# Patient Record
Sex: FEMALE | Race: WHITE | Employment: OTHER | URBAN - METROPOLITAN AREA
[De-identification: names, ages, dates, MRNs, and addresses within clinical notes are randomized per-mention and may not be internally consistent; named-entity substitution may affect disease eponyms.]

---

## 2017-06-01 ENCOUNTER — HOSPITAL ENCOUNTER (OUTPATIENT)
Dept: ULTRASOUND IMAGING | Age: 67
Discharge: HOME | End: 2017-06-01
Attending: FAMILY MEDICINE
Payer: MEDICARE

## 2017-06-01 ENCOUNTER — HOSPITAL ENCOUNTER (OUTPATIENT)
Dept: MAMMOGRAPHY | Age: 67
Discharge: HOME | End: 2017-06-01
Attending: FAMILY MEDICINE
Payer: MEDICARE

## 2017-06-01 DIAGNOSIS — N61.1 LEFT BREAST ABSCESS: ICD-10-CM

## 2017-06-01 DIAGNOSIS — N63.0 LUMP OR MASS IN BREAST: ICD-10-CM

## 2017-06-01 PROCEDURE — 76642 ULTRASOUND BREAST LIMITED: CPT

## 2017-06-01 PROCEDURE — 77066 DX MAMMO INCL CAD BI: CPT

## 2017-06-21 ENCOUNTER — HOSPITAL ENCOUNTER (OUTPATIENT)
Dept: GENERAL RADIOLOGY | Age: 67
Discharge: HOME | End: 2017-06-21
Payer: MEDICARE

## 2017-06-21 DIAGNOSIS — M54.32 BACK PAIN WITH LEFT-SIDED SCIATICA: ICD-10-CM

## 2017-06-21 PROCEDURE — 72100 X-RAY EXAM L-S SPINE 2/3 VWS: CPT

## 2017-06-21 PROCEDURE — 73502 X-RAY EXAM HIP UNI 2-3 VIEWS: CPT

## 2017-07-06 ENCOUNTER — HOSPITAL ENCOUNTER (OUTPATIENT)
Dept: PHYSICAL THERAPY | Age: 67
Discharge: HOME | End: 2017-07-06
Payer: MEDICARE

## 2017-07-06 PROCEDURE — G8985 CARRY GOAL STATUS: HCPCS

## 2017-07-06 PROCEDURE — 97140 MANUAL THERAPY 1/> REGIONS: CPT

## 2017-07-06 PROCEDURE — 97110 THERAPEUTIC EXERCISES: CPT

## 2017-07-06 PROCEDURE — 97161 PT EVAL LOW COMPLEX 20 MIN: CPT

## 2017-07-06 PROCEDURE — 97035 APP MDLTY 1+ULTRASOUND EA 15: CPT

## 2017-07-06 PROCEDURE — G8984 CARRY CURRENT STATUS: HCPCS

## 2017-07-06 NOTE — PROGRESS NOTES
[facility] [address], [address] [place] [address] 434,[facility] 300 [phone] fax    Plan of Care/ Statement of Necessity for Physical Therapy Services    Patient name: Waleska Cotto Start of Care: 2017   Referral source: Harry Hui MD : 1950    Medical Diagnosis: Low back pain [M54.5]   Onset Date:2017    Treatment Diagnosis: Pelvic obliquity, Glute muscle tightness   Prior Hospitalization: see medical history Provider#: [id number]   Medications: Verified on Patient summary List    Comorbidities: HTN, Thyroid problems   Prior Level of Function:  Able to dance with friends, do house work, lift a case of water and able to do gardening      The Plan of Care and following information is based on the information from the initial evaluation. Assessment/ key information: Patient is a 78 y.o. female referred to PT with the above Dx. Patient seen today for c/o LBP with radicular pain into the (L) buttocks. Patient presents to PT with an impaired gait, decreased strength, decreased flexibility, and decreased mobility. Patient s/s appear to be consistent w/ diagnosis. Patient demonstrates the potential to make functional gains within a reasonable time frame. Patient will benefit from skilled PT to address impairments and improve functional mobility, strength, gait and balance for an improved quality of life.   Fall Risk Assessment: Patient demonstrates no Fall Risk   Evaluation Complexity History MEDIUM  Complexity : 1-2 comorbidities / personal factors will impact the outcome/ POC ; Examination LOW Complexity : 1-2 Standardized tests and measures addressing body structure, function, activity limitation and / or participation in recreation  ;Presentation LOW Complexity : Stable, uncomplicated  ;Clinical Decision Making MEDIUM Complexity : FOTO score of 26-74  Overall Complexity Rating: LOW   Problem List: pain affecting function, decrease ROM, decrease strength, impaired gait/ balance, decrease ADL/ functional abilitiies, decrease activity tolerance, decrease flexibility/ joint mobility, decrease transfer abilities and other FOTO = 36   Treatment Plan may include any combination of the following: Therapeutic exercise, Therapeutic activities, Neuromuscular re-education, Physical agent/modality, Gait/balance training, Manual therapy, Patient education, Self Care training and Functional mobility training  Patient / Family readiness to learn indicated by: asking questions, trying to perform skills and interest  Persons(s) to be included in education: patient (P)  Barriers to Learning/Limitations: None  Patient Goal (s): To be able to do the things that I used to do around my house. Be able to dance  Patient Self Reported Health Status: good  Rehabilitation Potential: good    Short Term Goals: To be accomplished in 5 treatments:  1. Pt will be compliant and independent with HEP in order to facilitate PT sessions and aid with self management   Eval Status:  Initiated   Current Status:  2. Pt to tolerate 30 min or more of TE and/or Interventions w/o increased s/s   Eval Status:  Initiated   Current Status:      Long Term Goals: To be accomplished in 18 treatments:  1. Pt will report 50% improvement or better with involvement to show a significant increase in function   Eval Status:  Initiated   Current Status:  2. Pt will demonstrate the ability to stoop and lift 25# to allow her to tolerate light lifting with house work   Eval Status:  Initiated   Current Status:  3. Pt will demonstrate push/pull of a 100# sled for 10 feet with little to no difficulty to allow her to move furniture with her daily cleaning   Eval Status:  Initiated   Current Status:  4.   Pt will improve FOTO score to 59 in 12 visits to show significant improvement in function for progress to performing usual work with little to no difficulty   Eval Status: 36   Current Status:     Frequency / Duration: Patient to be seen 2-3 times per week for 18 treatments. Patient/ Caregiver education and instruction: Diagnosis, prognosis, self care, activity modification and exercises   Comments:  Patient provided w/HEP   [x]  Plan of care has been reviewed with SCOTTY    G-Marry (GP)    Carry   Current  CL= 60-79%    Goal  CK= 40-59%      The severity rating is based on clinical judgment and the FOTO score. Certification Period: 7/6/17 - 8/4/17  Richmond Melendez, PT 7/6/2017 3:19 PM    ________________________________________________________________________    I certify that the above Therapy Services are being furnished while the patient is under my care. I agree with the treatment plan and certify that this therapy is necessary.     Physician's Signature:____________________  Date:____________Time: _________    Please sign and return to [facility] [address] [facility] [address]    [address] [facility], [address] [place] [address] 434,[place] 300  [phone] fax

## 2017-07-06 NOTE — PROGRESS NOTES
PT DAILY TREATMENT NOTE/LUMBAR EVAL 3-16    Patient Name: Bailey Wynn  Date:2017  : 1950  [x]  Patient  Verified  Payor: [place] MEDICARE / Plan: [place] MEDICARE PART A & B / Product Type: Medicare /    In [id number]  Out time:1100  Total Treatment Time (min): 60  Total Timed Codes (min): 38  1:1 Treatment Time ( only): 60   Visit #: 1 of 18    Treatment Area: Low back pain [M54.5]  SUBJECTIVE  Pain Level (0-10 scale): 5  [x]constant []intermittent []improving []worsening []no change since onset    Any medication changes, allergies to medications, adverse drug reactions, diagnosis change, or new procedure performed?: [x] No    [] Yes (see summary sheet for update)  Subjective functional status/changes:   Pt c/o LBP and buttocks pain that sometimes shoots up into the (R) mid back. Pain with lifting. The pain is there all of the time. Pain is decreased with Aleve. Uses a heating pad on the low back for relief. Rest helps with the pain, stopped lifting with house work    Subjective:  Chief Complaint/: Pain in the low back and (L) buttocks    Onset: 2017    Mechanism of Injury: Mid part of May when she fell in a shower but no added pain,  In  pain got worse after pulling weeds in the garden    PMHx/Surgical Hx: thyroid problems, HTN    What type of work do you do? No    Living Situation: Lives at home with her     What type of daily activities/hobbies? House work, gardening, cooking,     Limitations to Activity: Limited lifting and moving furniture with cleaning, Bending, can't lift a case of water     Current Health Status:  Good    Barriers: [x]pain []financial []time []transportation []other    Goal: To be able to do the things that I used to do around my house.   Be able to dance    PLOF: Able to dance with friends, do house work, lift a case of water and able to do gardening    Limitations to PLOF:    Motivation: High    Substance use: [x]Alcohol []Tobacco []other: Wine everynow and then    FABQ Score: []low []elevate    Cognition: A & O x 3    Other:        OBJECTIVE/EXAMINATION  Domestic Life: Normal  Activity/Recreational Limitations: Yes, due to back pain   Mobility: (I)  Self Care: (I)    - Gait - decr (L) pelvic sway, decr trunk rot, decr (B) Hip flx  - Elev (R) Sacral base  - Good innominate mobility in stork stance        Modality rationale: decrease inflammation, decrease pain and increase tissue extensibility to improve the patients ability to tolerate increased activity levels   Min Type Additional Details    [] Estim:  []Unatt       []IFC  []Premod                        []Other:  []w/ice   []w/heat  Position:  Location:    [] Estim: []Att    []TENS instruct  []NMES                    []Other:  []w/US   []w/ice   []w/heat  Position:  Location:    []  Traction: [] Cervical       []Lumbar                       [] Prone          []Supine                       []Intermittent   []Continuous Lbs:  [] before manual  [] after manual    [x]  Ultrasound: [x]Continuous   [] Pulsed     8 Min                      [x]1MHz   []3MHz Location: (L) Superior glute and (B) Lx paraspinals  W/cm2:1.8    []  Iontophoresis with dexamethasone         Location: [] Take home patch   [] In clinic    []  Ice     []  heat  []  Ice massage  []  Laser   []  Anodyne Position:  Location:    []  Laser with stim  []  Other: Position:  Location:    []  Vasopneumatic Device Pressure:       [] lo [] med [] hi   Temperature: [] lo [] med [] hi   [] Skin assessment post-treatment:  []intact []redness- no adverse reaction    []redness  adverse reaction:     22 min [x]Eval                  []Re-Eval       10 min Therapeutic Exercise:  [x] See flow sheet : Develop and instruct HEP   Rationale: increase ROM, increase strength and improve coordination to improve the patients ability to tolerate increased activity levels    20 min Manual Therapy:  STM/DTM (L) Superior glute and (B) Lx paraspinals, Inf glide (R) sacral base, (B) Lx rot mobs, (B) LE LAD   Rationale: decrease pain, increase ROM, increase tissue extensibility and decrease trigger points to tolerate increased activity levels        With   [] TE   [] TA   [] neuro   [] other: Patient Education: [x] Review HEP    [] Progressed/Changed HEP based on:   [] positioning   [] body mechanics   [] transfers   [] heat/ice application    [] other:      Other Objective/Functional Measures:   - Improved Sacral alignment and pelvic mobility after treatment  - good response to treatment with decreased pain    Physical Therapy Evaluation - Lumbar Spine ([facility])    SUBJECTIVE  Chief Complaint:    Mechanism of injury:    Symptoms:  Aggravated by:   [] Bending [] Sitting [] Standing [] Walking   [] Moving [] Cough [] Sneeze [] Valsalva   [] AM  [] PM  Lying:  [] sup   [] pro   [] sidelying   [] Other:     Eased by:    [] Bending [] Sitting [] Standing [] Walking   [] Moving [] AM  [] PM  Lying: [] sup  [] pro  [] sidelying   [] Other:     General Health:  Red Flags Indicated? [] Yes    [] No  [] Yes [] No Recent weight change (If yes, due to dieting?  [] Yes  [] No)   [] Yes [] No Weakness in legs during walking  [] Yes [] No Unremitting pain at night  [] Yes [] No Abdominal pain or problems  [] Yes [] No Rectal bleeding  [] Yes [] No Feet more cold or painful in cold weather  [] Yes [] No Menstrual irregularities  [] Yes [] No Blood or pain with urination  [] Yes [] No Dysfunction of bowel or bladder  [] Yes [] No Recent illness within past 3 weeks (i.e, cold, flu)  [] Yes [] No Numbness/tingling in buttock/genitalia region    Past History/Treatments:     Diagnostic Tests: [] Lab work [] X-rays    [] CT [] MRI     [] Other:  Results:    Functional Status  Prior level of function:  Present functional limitations:  What position do you sleep in?:    OBJECTIVE  Posture:  Lateral Shift: [] R    [] L     [] +  [] -  Kyphosis: [] Increased [] Decreased   []  WNL  Lordosis:  [] Increased [] Decreased   [] WNL  Pelvic symmetry: [] WNL    [] Other:    Gait:  [] Normal     [] Abnormal:    Active Movements: [] N/A   [] Too acute   [] Other:  ROM % AROM % PROM Comments:pain, area   Forward flexion 40-60      Extension 20-30      SB right 20-30      SB left 20-30      Rotation right 5-10      Rotation left 5-10        Repeated Movements   Effects on present pain: produces (LA), abolishes (A), increases (incr), decreases (decr), centralizes (C), peripheral (PH), no effect (NE)   Pre-Test Sx Flexion Repeated Flexion Extension Repeated Extension Repeated SBL Repeated SBR   Sitting          Standing          Lying      N/A N/A   Comments:  Side Glide:  Sustained passive positioning test:    Neuro Screen [] WNL  Myotome/Dermatome/Reflexes:  Comments:    Dural Mobility:  SLR Sitting: [] R    [] L    [] +    [] -  @ (degrees):           Supine: [] R    [] L    [] +    [] -  @ (degrees):   Slump Test: [] R    [] L    [] +    [] -  @ (degrees):   Prone Knee Bend: [] R    [] L    [] +    [] -     Palpation  [] Min  [] Mod  [] Severe    Location:  [] Min  [] Mod  [] Severe    Location:  [] Min  [] Mod  [] Severe    Location:    Strength   L(0-5) R (0-5) N/T   Hip Flexion (L1,2)   []   Knee Extension (L3,4)   []   Ankle Dorsiflexion (L4)   []   Great Toe Extension (L5)   []   Ankle Plantarflexion (S1)   []   Knee Flexion (S1,2)   []   Upper Abdominals   []   Lower Abdominals   []   Paraspinals   []   Back Rotators   []   Gluteus Dionicio   []   Other   []     Special Tests  Lumbar:  Lumb.  Compression: [] Pos  [] Neg               Lumbar Distraction:   [] Pos  [] Neg    Quadrant:  [] Pos  [] Neg   [] Flex  [] Ext    Sacroilliac:  Gaenslen's: [] R    [] L    [] +    [] -     Compression: [] +    [] -     Gapping:  [] +    [] -     Thigh Thrust: [] R    [] L    [] +    [] -     Leg Length: [] +    [] -   Position:    Crests:    ASIS:    PSIS:    Sacral Sulcus:    Mobility: Standing flex:     Sitting flex:     Supine to sit:     Prone knee bend:         Hip: Garcia Miracle:  [] R    [] L    [] +    [] -     Scour:  [] R    [] L    [] +    [] -     Piriformis: [] R    [] L    [] +    [] -          Deficits: Boris's: [] R    [] L    [] +    [] -     Adline Row: [] R    [] L    [] +    [] -     Hamstrings 90/90:    Gastrocsoleus (to neutral): Right: Left:       Global Muscular Weakness:  Abdominals:  Quadratus Lumborum:  Paraspinals: Other:    Other tests/comments:       Pain Level (0-10 scale) post treatment: 0    ASSESSMENT/Changes in Function:      Patient will continue to benefit from skilled PT services to modify and progress therapeutic interventions, address functional mobility deficits, address ROM deficits, address strength deficits, analyze and address soft tissue restrictions, analyze and cue movement patterns and analyze and modify body mechanics/ergonomics to attain remaining goals.      [x]  See Plan of Care  []  See progress note/recertification  []  See Discharge Summary         Progress towards goals / Updated goals:       PLAN  [x]  Upgrade activities as tolerated     [x]  Continue plan of care  []  Update interventions per flow sheet       []  Discharge due to:_  []  Other:_      Crystal Aleman, PT 7/6/2017  9:57 AM

## 2017-07-10 ENCOUNTER — HOSPITAL ENCOUNTER (OUTPATIENT)
Age: 67
Discharge: HOME | End: 2017-07-10
Attending: FAMILY MEDICINE
Payer: MEDICARE

## 2017-07-10 ENCOUNTER — HOSPITAL ENCOUNTER (OUTPATIENT)
Dept: NUCLEAR MEDICINE | Age: 67
Discharge: HOME | End: 2017-07-10
Attending: FAMILY MEDICINE
Payer: MEDICARE

## 2017-07-10 VITALS — BODY MASS INDEX: 29.08 KG/M2 | WEIGHT: 159 LBS

## 2017-07-10 DIAGNOSIS — M54.9 BACK PAIN: ICD-10-CM

## 2017-07-10 DIAGNOSIS — R10.11 RUQ ABDOMINAL PAIN: ICD-10-CM

## 2017-07-10 PROCEDURE — 74011000258 HC RX REV CODE- 258: Performed by: FAMILY MEDICINE

## 2017-07-10 PROCEDURE — 74011250636 HC RX REV CODE- 250/636: Performed by: FAMILY MEDICINE

## 2017-07-10 PROCEDURE — 78227 HEPATOBIL SYST IMAGE W/DRUG: CPT

## 2017-07-10 RX ORDER — SODIUM CHLORIDE 9 MG/ML
50 INJECTION, SOLUTION INTRAVENOUS
Status: COMPLETED | OUTPATIENT
Start: 2017-07-10 | End: 2017-07-10

## 2017-07-10 RX ADMIN — SODIUM CHLORIDE 50 ML/HR: 900 INJECTION, SOLUTION INTRAVENOUS at 10:04

## 2017-07-10 RX ADMIN — SINCALIDE 1.4 MCG: 5 INJECTION, POWDER, LYOPHILIZED, FOR SOLUTION INTRAVENOUS at 10:04

## 2017-07-14 ENCOUNTER — HOSPITAL ENCOUNTER (OUTPATIENT)
Dept: PHYSICAL THERAPY | Age: 67
Discharge: HOME | End: 2017-07-14
Payer: MEDICARE

## 2017-07-14 PROCEDURE — 97140 MANUAL THERAPY 1/> REGIONS: CPT

## 2017-07-14 PROCEDURE — 97110 THERAPEUTIC EXERCISES: CPT

## 2017-07-14 NOTE — PROGRESS NOTES
PT DAILY TREATMENT NOTE - [facility] 3-16    Patient Name: Aldo Cheung  Date:2017  : 1950  [x]  Patient  Verified  Payor: Gurvinder Granados / Plan: [place] MEDICARE PART A & B / Product Type: Medicare /    In time:11:00  Out time: 11:55  Total Treatment Time (min): 55  Total Timed Codes (min): 45 (10 minutes discussing LBP symptoms with patient)   1:1 Treatment Time ( [address] only): 40   Visit #: 2 of 18    Treatment Area: Low back pain [M54.5]    SUBJECTIVE  Pain Level (0-10 scale): 5/10   Any medication changes, allergies to medications, adverse drug reactions, diagnosis change, or new procedure performed?: [x] No    [] Yes (see summary sheet for update)  Subjective functional status/changes:   [] No changes reported  Patient stated that she has pain in (L) side of low back/buttock. Patient reported no numbness/tingling in (L) leg right now. Patient stated she received the disc for the MRI she had done recently and has an appointment to follow up with her MD in a couple weeks. OBJECTIVE    30 min Therapeutic Exercise:  [x] See flow sheet :    Rationale: increase ROM and increase strength to improve the patients ability to perform ADLs. 15 min Manual Therapy:  2 cycles of MET to correct (L) posterior innominate rotation. STM to (L) upper gluts. Rationale: decrease pain, increase ROM and increase tissue extensibility to increase ease with ADLs. With   [] TE   [] TA   [] neuro   [] other: Patient Education: [x] Review HEP    [] Progressed/Changed HEP based on:   [] positioning   [] body mechanics   [] transfers   [] heat/ice application    [] other:      Other Objective/Functional Measures:   TTP at (L) upper gluts and sacrum with gentle palpation  Changed Seated forward flexion stretch to seated Sball stretch into trunk flexion and patient stated it felt more comfortable.  Patient attempted to stretch toward the right with S.ball, but had increased discomfort in LBP and (R) shoulder, so had patient stop.   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 Was able to be corrected with 2 cycles of MET. Patient reported decreased discomfort with STM to (L) upper gluts. Patient reported a slight decrease in pain at end of session. Reviewed HEP with patient and advised patient to stop if pain increases. Held doing US today due to recent MRI result revealing a possible hemangioma, and to determine how patient does with just manual and therapeutic exercises. Patient will continue to benefit from skilled PT services to modify and progress therapeutic interventions, address functional mobility deficits, address ROM deficits, address strength deficits, analyze and address soft tissue restrictions, analyze and cue movement patterns, assess and modify postural abnormalities and address imbalance/dizziness to attain remaining goals. []  See Plan of Care  []  See progress note/recertification  []  See Discharge Summary         Progress towards goals / Updated goals:  1. Pt will be compliant and independent with HEP in order to facilitate PT sessions and aid with self management                        Eval Status:  Initiated                         Current Status:  2. Pt to tolerate 30 min or more of TE and/or Interventions w/o increased s/s                        Eval Status:  Initiated                         Current Status:        Long Term Goals: To be accomplished in 18 treatments:  1. Pt will report 50% improvement or better with involvement to show a significant increase in function                        Eval Status:  Initiated                         Current Status:  2. Pt will demonstrate the ability to stoop and lift 25# to allow her to tolerate light lifting with house work                        Eval Status:  Initiated                         Current Status:  3.   Pt will demonstrate push/pull of a 100# sled for 10 feet with little to no difficulty to allow her to move furniture with her daily cleaning                        Eval Status:  Initiated                         Current Status:  4.   Pt will improve FOTO score to 59 in 12 visits to show significant improvement in function for progress to performing usual work with little to no difficulty                        Eval Status: 36                         Current Status:    PLAN  []  Upgrade activities as tolerated     [x]  Continue plan of care  []  Update interventions per flow sheet       []  Discharge due to:_  []  Other:_      Estefanía Cheung, PT 7/14/2017  11:15 AM

## 2017-07-17 ENCOUNTER — HOSPITAL ENCOUNTER (OUTPATIENT)
Dept: PHYSICAL THERAPY | Age: 67
Discharge: HOME | End: 2017-07-17
Payer: MEDICARE

## 2017-07-17 PROCEDURE — 97140 MANUAL THERAPY 1/> REGIONS: CPT

## 2017-07-17 PROCEDURE — 97035 APP MDLTY 1+ULTRASOUND EA 15: CPT

## 2017-07-17 PROCEDURE — 97110 THERAPEUTIC EXERCISES: CPT

## 2017-07-17 NOTE — PROGRESS NOTES
PT DAILY TREATMENT NOTE - [facility] 3-16    Patient Name: Raiza Mcdonald  Date:2017  : 1950  [x]  Patient  Verified  Payor: [place] MEDICARE / Plan: [place] MEDICARE PART A & B / Product Type: Medicare /    In time:208  Out time:251  Total Treatment Time (min): 42  Total Timed Codes (min): 42  1:1 Treatment Time ( [address] only): 38  Visit #: 3 of 18    Treatment Area: Low back pain [M54.5]    SUBJECTIVE  Pain Level (0-10 scale):5  Any medication changes, allergies to medications, adverse drug reactions, diagnosis change, or new procedure performed?: [x] No    [] Yes (see summary sheet for update)  Subjective functional status/changes:   [] No changes reported  I did the ice when I went home. She didn't do the US the last time but it helped a lot after Gabe did it.      OBJECTIVE    Modality rationale: decrease inflammation, decrease pain and increase tissue extensibility to improve the patients ability to aid with increase tolerance to ADLS and activities   Min Type Additional Details    [] Estim:  []Unatt       []IFC  []Premod                        []Other:  []w/ice   []w/heat  Position:  Location:    [] Estim: []Att    []TENS instruct  []NMES                    []Other:  []w/US   []w/ice   []w/heat  Position:  Location:    []  Traction: [] Cervical       []Lumbar                       [] Prone          []Supine                       []Intermittent   []Continuous Lbs:  [] before manual  [] after manual   8 [x]  Ultrasound: []Continuous   [x] Pulsed           8                []1MHz   [x]3MHz Location:B LS and L superior glut  W/cm2:1.3    []  Iontophoresis with dexamethasone         Location: [] Take home patch   [] In clinic    []  Ice     []  heat  []  Ice massage  []  Laser   []  Anodyne Position:  Location:    []  Laser with stim  []  Other: Position:  Location:    []  Vasopneumatic Device Pressure:       [] lo [] med [] hi   Temperature: [] lo [] med [] hi   [] Skin assessment post-treatment:  []intact []redness- no adverse reaction    []redness  adverse reaction:      min []Eval                  []Re-Eval       21 min Therapeutic Exercise:  [x] See flow sheet :   Rationale: increase ROM, increase strength and improve coordination to improve the patients ability to aid with increase tolerance to ADLS and activities     min Therapeutic Activity:  []  See flow sheet :   Rationale:   to improve the patients ability to       min Neuromuscular Re-education:  []  See flow sheet :   Rationale:   to improve the patients ability to     13 min Manual Therapy:  STM DTM TPR L LS and superior glut >R   Rationale: decrease pain, increase ROM, increase tissue extensibility and decrease trigger points to aid with increase tolerance to ADLs     min Gait Training:  ___ feet with ___ device on level surfaces with ___ level of assist   Rationale: With   [] TE   [] TA   [] neuro   [] other: Patient Education: [x] Review HEP    [] Progressed/Changed HEP based on:   [] positioning   [] body mechanics   [] transfers   [] heat/ice application    [] other:      Other Objective/Functional Measures: VC exercises     Pain Level (0-10 scale) post treatment: <5    ASSESSMENT/Changes in Function: tolerated well. Trigger point L superior glut. Patient will continue to benefit from skilled PT services to modify and progress therapeutic interventions, address functional mobility deficits, address ROM deficits, address strength deficits, analyze and address soft tissue restrictions, analyze and cue movement patterns, analyze and modify body mechanics/ergonomics, assess and modify postural abnormalities and instruct in home and community integration to attain remaining goals.      [x]  See Plan of Care  []  See progress note/recertification  []  See Discharge Summary         Progress towards goals / Updated goals:  1.  Pt will be compliant and independent with HEP in order to facilitate PT sessions and aid with self management                        Eval Status:  Initiated                         UBOCVHH Status: progressing 7/17/17  2.  Pt to tolerate 30 min or more of TE and/or Interventions w/o increased s/s                        Eval Status:  Initiated                         NLOXPCQ Status: 21 7/17/17          Long Term Goals: To be accomplished in 18 treatments:  1.  Pt will report 50% improvement or better with involvement to show a significant increase in function                        Eval Status:  Initiated                         RIFVWVN Status:  2.  Pt will demonstrate the ability to stoop and lift 25# to allow her to tolerate light lifting with house work                        Eval Status:  Initiated                         Current Status:  3.  Pt will demonstrate push/pull of a 100# sled for 10 feet with little to no difficulty to allow her to move furniture with her daily cleaning                        Eval Status:  Initiated                         MTWRBCI Status:  4.  Pt will improve FOTO score to 59 in 12 visits to show significant improvement in function for progress to performing usual work with little to no difficulty                        Eval Status: 36                         Current Status:    PLAN  [x]  Upgrade activities as tolerated     [x]  Continue plan of care  []  Update interventions per flow sheet       []  Discharge due to:_  []  Other:_      Ángela Muro, PT 7/17/2017  1:47 PM

## 2017-07-19 ENCOUNTER — HOSPITAL ENCOUNTER (OUTPATIENT)
Dept: PHYSICAL THERAPY | Age: 67
Discharge: HOME | End: 2017-07-19
Payer: MEDICARE

## 2017-07-19 PROCEDURE — 97110 THERAPEUTIC EXERCISES: CPT

## 2017-07-19 PROCEDURE — 97140 MANUAL THERAPY 1/> REGIONS: CPT

## 2017-07-19 PROCEDURE — 97035 APP MDLTY 1+ULTRASOUND EA 15: CPT

## 2017-07-19 NOTE — PROGRESS NOTES
PT DAILY TREATMENT NOTE - [facility] 3-16    Patient Name: Ju Reasons  Date:2017  : 1950  [x]  Patient  Verified  Payor: Omar Bey / Plan: [place] MED[place] & B / Product Type: Medicare /    In time:1034  Out time:1200  Total Treatment Time (min): 72  Total Timed Codes (min): 72  1:1 Treatment Time ( [address] only): 40   Visit #: 4 of 10    Treatment Area: Low back pain [M54.5]    SUBJECTIVE  Pain Level (0-10 scale): 4  Any medication changes, allergies to medications, adverse drug reactions, diagnosis change, or new procedure performed?: [x] No    [] Yes (see summary sheet for update)  Subjective functional status/changes:   [] No changes reported  The US seems to make it better.       OBJECTIVE  Modality rationale: decrease inflammation, decrease pain and increase tissue extensibility to improve the patients ability to tolerate increased activity levels   Min Type Additional Details    [] Estim:  []Unatt       []IFC  []Premod                        []Other:  []w/ice   []w/heat  Position:  Location:    [] Estim: []Att    []TENS instruct  []NMES                    []Other:  []w/US   []w/ice   []w/heat  Position:  Location:    []  Traction: [] Cervical       []Lumbar                       [] Prone          []Supine                       []Intermittent   []Continuous Lbs:  [] before manual  [] after manual    [x]  Ultrasound: [x]Continuous   [] Pulsed        8 Min                   [x]1MHz   []3MHz Location: (L) Superior glute  W/cm2: 1.5    []  Iontophoresis with dexamethasone         Location: [] Take home patch   [] In clinic   10 [x]  Ice     []  heat  []  Ice massage  []  Laser   []  Anodyne Position: Prone Lx Ext  Location: (L) Buttocks    []  Laser with stim  []  Other: Position:  Location:    []  Vasopneumatic Device Pressure:       [] lo [] med [] hi   Temperature: [] lo [] med [] hi   [x] Skin assessment post-treatment:  [x]intact []redness- no adverse reaction    []redness  adverse reaction: 26 min Therapeutic Exercise:  [x] See flow sheet :   Rationale: increase ROM, increase strength and improve coordination to improve the patients ability to tolerate increased activity levels  8 min Manual Therapy:  Inf [place] (R) Sacral base, (B) LE LAD, (B) Lx Rot Mobs,    Rationale: decrease pain and increase ROM to tolerate increased activity levels  20 min Neuromuscular Re-education:  [x]  See flow sheet :   Rationale: increase ROM, increase strength, improve coordination and Inhibit neural tension (L) buttocks  to improve the patients ability to tolerate increased activity levels             With   [x] TE   [] TA   [] neuro   [] other: Patient Education: [x] Review HEP    [] Progressed/Changed HEP based on:   [] positioning   [] body mechanics   [] transfers   [] heat/ice application    [] other:      Other Objective/Functional Measures:   - Elev (R) Sacral base  - TTP (L) Sacral base and superior glute with light touch  - Good response to Posn Distraction into Lx ext with decreased pain in (L) buttocks     Pain Level (0-10 scale) post treatment: 0    ASSESSMENT/Changes in Function:      Patient will continue to benefit from skilled PT services to modify and progress therapeutic interventions, address functional mobility deficits, address ROM deficits, address strength deficits, analyze and address soft tissue restrictions, analyze and cue movement patterns and analyze and modify body mechanics/ergonomics to attain remaining goals.      []  See Plan of Care  []  See progress note/recertification  []  See Discharge Summary             Progress towards goals / Updated goals:  1.  Pt will be compliant and independent with HEP in order to facilitate PT sessions and aid with self management                        Eval Status:  Initiated                         ZMUKKGZ Status: progressing 7/17/17  2.  Pt to tolerate 30 min or more of TE and/or Interventions w/o increased s/s                        Eval Status:  Initiated                         JSVJDNE Status: 21 7/17/17          Long Term Goals: To be accomplished in 18 treatments:  1.  Pt will report 50% improvement or better with involvement to show a significant increase in function                        Eval Status:  Initiated                         FKLXDSN Status:  2.  Pt will demonstrate the ability to stoop and lift 25# to allow her to tolerate light lifting with house work                        Eval Status:  Initiated                         Current Status:  3.  Pt will demonstrate push/pull of a 100# sled for 10 feet with little to no difficulty to allow her to move furniture with her daily cleaning                        Eval Status:  Initiated                         Current Status:  4.  Pt will improve FOTO score to 59 in 12 visits to show significant improvement in function for progress to performing usual work with little to no difficulty                        Eval Status: 36                         Current Status:  PLAN  [x]  Upgrade activities as tolerated     [x]  Continue plan of care  []  Update interventions per flow sheet       []  Discharge due to:_  []  Other:_      Debby Modi, PT 7/19/2017  12:32 PM    Future Appointments  Date Time Provider Rosette Freeman   7/21/2017 9:30 AM Brittany Guaman, PT [facility]   7/24/2017 4:00 PM Debby Modi PT [facility]   7/28/2017 10:00 AM Debby Modi PT [facility]   8/3/2017 2:00 PM Hector Escamilla MD [address] [place] [address]   12/15/2017 11:20 AM Jerson Jordan MD [address] [place] [address]

## 2017-07-21 ENCOUNTER — HOSPITAL ENCOUNTER (OUTPATIENT)
Dept: PHYSICAL THERAPY | Age: 67
Discharge: HOME | End: 2017-07-21
Payer: MEDICARE

## 2017-07-21 PROCEDURE — 97140 MANUAL THERAPY 1/> REGIONS: CPT

## 2017-07-21 PROCEDURE — 97032 APPL MODALITY 1+ESTIM EA 15: CPT

## 2017-07-21 PROCEDURE — 97035 APP MDLTY 1+ULTRASOUND EA 15: CPT

## 2017-07-21 NOTE — PROGRESS NOTES
PT DAILY TREATMENT NOTE - [facility] 316    Patient Name: Krys Christiansen  Date:2017  : 1950  [x]  Patient  Verified  Payor: [place] MEDICARE / Plan: [place] MEDICARE PART A & B / Product Type: Medicare /    In time:924  Out time:1020  Total Treatment Time (min):45  Total Timed Codes (min): 45  1:1 Treatment Time ( [address] only): 25  Visit #: 5 of 10    Treatment Area: Low back pain [M54.5]    SUBJECTIVE  Pain Level (0-10 scale): 4  Any medication changes, allergies to medications, adverse drug reactions, diagnosis change, or new procedure performed?: [x] No    [] Yes (see summary sheet for update)  Subjective functional status/changes:   [] No changes reported  It is still about a 4. I am really not doing anything too heavy at home.     OBJECTIVE    Modality rationale: decrease inflammation, decrease pain and increase tissue extensibility to improve the patients ability to aid with increase tolerance to ADLS and activities   Min Type Additional Details    [] Estim:  []Unatt       []IFC  []Premod                        []Other:  []w/ice   []w/heat  Position:  Location:    [] Estim: []Att    []TENS instruct  []NMES                    []Other:  []w/US   []w/ice   []w/heat  Position:  Location:    []  Traction: [] Cervical       []Lumbar                       [] Prone          []Supine                       []Intermittent   []Continuous Lbs:  [] before manual  [] after manual   8 [x]  Ultrasound: [x]Continuous   [] Pulsed          8                 [x]1MHz   []3MHz Location:L Superior glut and L SIJ  W/cm2:1.3    []  Iontophoresis with dexamethasone         Location: [] Take home patch   [] In clinic    []  Ice     []  heat  []  Ice massage  []  Laser   []  Anodyne Position:  Location:    []  Laser with stim  []  Other: Position:  Location:    []  Vasopneumatic Device Pressure:       [] lo [] med [] hi   Temperature: [] lo [] med [] hi   [] Skin assessment post-treatment:  []intact []redness- no adverse reaction    []redness  adverse reaction:      min []Eval                  []Re-Eval       27 min Therapeutic Exercise:  [x] See flow sheet :   Rationale: increase ROM, increase strength and improve coordination to improve the patients ability to aid with increase tolerance to ADLS and activities     min Therapeutic Activity:  []  See flow sheet :   Rationale:   to improve the patients ability to       min Neuromuscular Re-education:  []  See flow sheet :   Rationale:   to improve the patients ability to     10 min Manual Therapy:  STM DTM TPR L LS /SIJ and superior glut   Rationale: decrease pain, increase ROM, increase tissue extensibility and decrease trigger points to aid with increase tolerance to ADLS and activities     min Gait Training:  ___ feet with ___ device on level surfaces with ___ level of assist   Rationale: With   [] TE   [] TA   [] neuro   [] other: Patient Education: [x] Review HEP    [] Progressed/Changed HEP based on:   [] positioning   [] body mechanics   [] transfers   [] heat/ice application    [] other:      Other Objective/Functional Measures: FOTO 57.    Pain Level (0-10 scale) post treatment: <4    ASSESSMENT/Changes in Function: tolerated well with residual C/O L SIJ Pain and L superior glut trigger point    Patient will continue to benefit from skilled PT services to modify and progress therapeutic interventions, address functional mobility deficits, address ROM deficits, address strength deficits, analyze and address soft tissue restrictions, analyze and cue movement patterns, analyze and modify body mechanics/ergonomics, assess and modify postural abnormalities and instruct in home and community integration to attain remaining goals.      [x]  See Plan of Care  []  See progress note/recertification  []  See Discharge Summary         Progress towards goals / Updated goals:   1.  Pt will be compliant and independent with HEP in order to facilitate PT sessions and aid with self management                        Eval Status:  Initiated                         PRZEOAR Status: progressing 7/17/17 7/21/17  2.  Pt to tolerate 30 min or more of TE and/or Interventions w/o increased s/s                        Eval Status:  Initiated                         BCZSBQE Status: 21 7/17/17   27 7/21/17          Long Term Goals: To be accomplished in 18 treatments:  1.  Pt will report 50% improvement or better with involvement to show a significant increase in function                        Eval Status:  Initiated                         FVPHRZE Status:  2.  Pt will demonstrate the ability to stoop and lift 25# to allow her to tolerate light lifting with house work                        Eval Status:  Initiated                         Current Status:  3.  Pt will demonstrate push/pull of a 100# sled for 10 feet with little to no difficulty to allow her to move furniture with her daily cleaning                        Eval Status:  Initiated                         QMLKGOS Status:  4.  Pt will improve FOTO score to 59 in 12 visits to show significant improvement in function for progress to performing usual work with little to no difficulty                        Eval Status: 36                         Current Status: 57 7/21/17    PLAN  [x]  Upgrade activities as tolerated     [x]  Continue plan of care  []  Update interventions per flow sheet       []  Discharge due to:_  []  Other:_      Karolyn File, PT 7/21/2017  9:11 AM

## 2017-07-24 ENCOUNTER — HOSPITAL ENCOUNTER (OUTPATIENT)
Dept: PHYSICAL THERAPY | Age: 67
Discharge: HOME | End: 2017-07-24
Payer: MEDICARE

## 2017-07-24 PROCEDURE — 97035 APP MDLTY 1+ULTRASOUND EA 15: CPT

## 2017-07-24 PROCEDURE — 97140 MANUAL THERAPY 1/> REGIONS: CPT

## 2017-07-24 PROCEDURE — 97110 THERAPEUTIC EXERCISES: CPT

## 2017-07-24 NOTE — PROGRESS NOTES
PT DAILY TREATMENT NOTE - [facility] 3-16    Patient Name: Larry Benoit  Date:2017  : 1950  [x]  Patient  Verified  Payor: [place] MEDICARE / Plan: [place] MEDICARE PART A & B / Product Type: Medicare /    In time:4:03  Out time:59  Total Treatment Time (min): 52  Total Timed Codes (min): 42  1:1 Treatment Time ( [address] only): 42  Visit #: 6 of 10    Treatment Area: Low back pain [M54.5]    SUBJECTIVE  Pain Level (0-10 scale): 6  Any medication changes, allergies to medications, adverse drug reactions, diagnosis change, or new procedure performed?: [x] No    [] Yes (see summary sheet for update)  Subjective functional status/changes:   [] No changes reported  Having more pain.   Pain is more at night    OBJECTIVE  Modality rationale: decrease inflammation, decrease pain and increase tissue extensibility to improve the patients ability to tolerate increased activity levels   Min Type Additional Details    [] Estim:  []Unatt       []IFC  []Premod                        []Other:  []w/ice   []w/heat  Position:  Location:    [] Estim: []Att    []TENS instruct  []NMES                    []Other:  []w/US   []w/ice   []w/heat  Position:  Location:    []  Traction: [] Cervical       []Lumbar                       [] Prone          []Supine                       []Intermittent   []Continuous Lbs:  [] before manual  [] after manual    [x]  Ultrasound: [x]Continuous   [] Pulsed       8'                    [x]1MHz   []3MHz Location: (B) PSIS/Lx paraspinals and Superior glute  W/cm2: 1.6    []  Iontophoresis with dexamethasone         Location: [] Take home patch   [] In clinic   10 [x]  Ice     []  heat  []  Ice massage  []  Laser   []  Anodyne Position:  Location:    []  Laser with stim  []  Other: Position:  Location:    []  Vasopneumatic Device Pressure:       [] lo [] med [] hi   Temperature: [] lo [] med [] hi   [x] Skin assessment post-treatment:  [x]intact []redness- no adverse reaction    []redness  adverse reaction: 10 min Therapeutic Exercise:  [x] See flow sheet :   Rationale: increase ROM, increase strength and improve coordination to improve the patients ability to tolerate increased activity levels  24 min Manual Therapy:  Inf glide (R) sacral base, (B) Innominate P/A mobs, (B) LE LAD, STM/DTM (B) Lx paraspinals/(B) PSIS and superior glute    Rationale: decrease pain, increase ROM, increase tissue extensibility and decrease trigger points to tolerate increased activity levels          With   [x] TE   [] TA   [] neuro   [] other: Patient Education: [x] Review HEP    [] Progressed/Changed HEP based on:   [] positioning   [] body mechanics   [] transfers   [] heat/ice application    [] other:      Other Objective/Functional Measures:   - Elev (R) sacral base  - Post Rot (R) Innominate     Pain Level (0-10 scale) post treatment: < 6    ASSESSMENT/Changes in Function:      Patient will continue to benefit from skilled PT services to modify and progress therapeutic interventions, address functional mobility deficits, address ROM deficits, address strength deficits, analyze and address soft tissue restrictions and analyze and cue movement patterns to attain remaining goals.      []  See Plan of Care  []  See progress note/recertification  []  See Discharge Summary         Progress towards goals / Updated goals:   1.  Pt will be compliant and independent with HEP in order to facilitate PT sessions and aid with self management                        Eval Status:  Initiated                         USXYKTL Status: progressing 7/17/17 7/21/17  2.  Pt to tolerate 30 min or more of TE and/or Interventions w/o increased s/s                        Eval Status:  Initiated                         EMEHMHV Status: 21 7/17/17   27 7/21/17          Long Term Goals: To be accomplished in 18 treatments:  1.  Pt will report 50% improvement or better with involvement to show a significant increase in function                        Eval Status:  Initiated                         FZVWKPK Status: No significant changes noted 7/24/17  2.  Pt will demonstrate the ability to stoop and lift 25# to allow her to tolerate light lifting with house work                        Eval Status:  Initiated                         ZPCJBRK Status: Initiated 7/21/17  3.  Pt will demonstrate push/pull of a 100# sled for 10 feet with little to no difficulty to allow her to move furniture with her daily cleaning                        Eval Status:  Initiated                         DLGIYYJ Status: Initiated 7/21/17  4.  Pt will improve FOTO score to 59 in 12 visits to show significant improvement in function for progress to performing usual work with little to no difficulty                        Eval Status: 36                         Current Status: 57 7/21/17    PLAN  [x]  Upgrade activities as tolerated     [x]  Continue plan of care  []  Update interventions per flow sheet       []  Discharge due to:_  []  Other:_      Italia Amador, PT 7/24/2017  4:19 PM    Future Appointments  Date Time Provider Rosette Freeman   7/28/2017 10:00 AM Italia Amador, PT [facility]   7/31/2017 4:00 PM Rosey Alcantara, PT [facility]   8/2/2017 8:30 AM Rosey Alcantara, PT [facility]   8/3/2017 2:00 PM Mau Wilkins MD [address] [place] [address] Drive   8/7/2017 3:00 PM Rosey Alcantara, PT [facility]   8/9/2017 9:30 AM Rosey Alcantara, PT [facility]   8/11/2017 11:00 AM Italia Amador PT [facility]   8/14/2017 3:00 PM Rosey Alcantara, PT [facility]   8/16/2017 11:00 AM Italia Amador PT [facility]   8/18/2017 10:00 AM Italia Amador, PT [facility]   12/15/2017 11:20 AM Josefina Conrad MD [address] [place] [address]

## 2017-07-28 ENCOUNTER — HOSPITAL ENCOUNTER (OUTPATIENT)
Dept: PHYSICAL THERAPY | Age: 67
Discharge: HOME | End: 2017-07-28
Payer: MEDICARE

## 2017-07-28 PROCEDURE — 97140 MANUAL THERAPY 1/> REGIONS: CPT

## 2017-07-28 PROCEDURE — 97110 THERAPEUTIC EXERCISES: CPT

## 2017-07-28 PROCEDURE — 97032 APPL MODALITY 1+ESTIM EA 15: CPT

## 2017-07-28 NOTE — PROGRESS NOTES
PT DAILY TREATMENT NOTE - [facility] 3-16    Patient Name: Bernarda Em  Date:2017  : 1950  [x]  Patient  Verified  Payor: [place] [employer] / Plan: [place] MEDICARE PART A & B / Product Type: Medicare /    In time:1010  Out time:1118  Total Treatment Time (min): 60  Total Timed Codes (min): 50  1:1 Treatment Time ([facility] only): 40  Visit #: 7 of 10    Treatment Area: Low back pain [M54.5]    SUBJECTIVE  Pain Level (0-10 scale): 3  Any medication changes, allergies to medications, adverse drug reactions, diagnosis change, or new procedure performed?: [x] No    [] Yes (see summary sheet for update)  Subjective functional status/changes:   [] No changes reported  Feeling better, had a massage yesterday    OBJECTIVE  Modality rationale: decrease inflammation, decrease pain and increase tissue extensibility to improve the patients ability to tolerate increased activity levels   Min Type Additional Details    [] Estim:  []Unatt       []IFC  []Premod                        []Other:  []w/ice   []w/heat  Position:  Location:   8 [x] Estim: [x]Att    []TENS instruct  []NMES                    [x]Other: LTO []w/US   []w/ice   []w/heat  Position:Prone  Location: (L) PSIS/Lx/Sup Glute    []  Traction: [] Cervical       []Lumbar                       [] Prone          []Supine                       []Intermittent   []Continuous Lbs:  [] before manual  [] after manual    []  Ultrasound: []Continuous   [] Pulsed                           []1MHz   []3MHz Location:   W/cm2:     []  Iontophoresis with dexamethasone         Location: [] Take home patch   [] In clinic   10 [x]  Ice  With posn dist    []  heat  []  Ice massage  []  Laser   []  Anodyne Position: Prone Lx Ext  Location: Lx/Glute    []  Laser with stim  []  Other: Position:  Location:    []  Vasopneumatic Device Pressure:       [] lo [] med [] hi   Temperature: [] lo [] med [] hi   [x] Skin assessment post-treatment:  [x]intact []redness- no adverse reaction    []redness  adverse reaction:     27 min Therapeutic Exercise:  [x] See flow sheet :   Rationale: increase ROM, increase strength and improve coordination to improve the patients ability to tolerate increased activity levels  15 min Manual Therapy:  Inf glide (R) sacral base, (B) Innominate P/A mobs, (B) LE LAD, STM/DTM (B) Lx paraspinals/(B) PSIS and superior glute    Rationale: decrease pain, increase ROM, increase tissue extensibility and decrease trigger points to tolerate increased activity levels  10 min Neuromuscular Re-education:  []  See flow sheet : Posn Dist Prone Lx Ext   Rationale: increase ROM, increase strength and improve coordination  to improve the patients ability to perform increased ADL           With   [x] TE   [] TA   [] neuro   [] other: Patient Education: [x] Review HEP    [] Progressed/Changed HEP based on:   [] positioning   [] body mechanics   [] transfers   [] heat/ice application    [] other:      Other Objective/Functional Measures:   - Elev (R) sacral base  - Good tolerance to Lx Ext posn distraction     Pain Level (0-10 scale) post treatment: 0    ASSESSMENT/Changes in Function:      Patient will continue to benefit from skilled PT services to modify and progress therapeutic interventions, address functional mobility deficits, address ROM deficits, address strength deficits, analyze and address soft tissue restrictions and analyze and cue movement patterns to attain remaining goals.      []  See Plan of Care  []  See progress note/recertification  []  See Discharge Summary         Progress towards goals / Updated goals:   1.  Pt will be compliant and independent with HEP in order to facilitate PT sessions and aid with self management                        Eval Status:  Initiated                         AKDZJSZ Status: progressing 7/17/17 7/21/17  2.  Pt to tolerate 30 min or more of TE and/or Interventions w/o increased s/s                        Eval Status:  Initiated                         EREEKFT Status: 21 7/17/17   27 7/21/17          Long Term Goals: To be accomplished in 18 treatments:  1.  Pt will report 50% improvement or better with involvement to show a significant increase in function                        Eval Status:  Initiated                         NACGFSF Status: No significant changes noted 7/24/17  2.  Pt will demonstrate the ability to stoop and lift 25# to allow her to tolerate light lifting with house work                        Eval Status:  Initiated                         Current Status: Initiated 7/21/17  3.  Pt will demonstrate push/pull of a 100# sled for 10 feet with little to no difficulty to allow her to move furniture with her daily cleaning                        Eval Status:  Initiated                         OZCOEVI Status: Initiated 7/21/17  4.  Pt will improve FOTO score to 59 in 12 visits to show significant improvement in function for progress to performing usual work with little to no difficulty                        Eval Status: 36                         Current Status: 57 7/21/17    PLAN  [x]  Upgrade activities as tolerated     [x]  Continue plan of care  []  Update interventions per flow sheet       []  Discharge due to:_  []  Other:_      David Subramanian, PT 7/28/2017  10:39 AM    Future Appointments  Date Time Provider Rosette Freeman   7/31/2017 4:00 PM Dennis Lopez, PT [facility]   8/2/2017 8:30 AM Dennis Lopez PT [facility]   8/3/2017 2:00 PM Sarah Thakkar MD [address] [facility]   8/7/2017 3:00 PM Dennis Lopez PT [facility]   8/9/2017 9:30 AM Dennis Lopez PT [facility]   8/11/2017 11:00 AM David Subramanian PT [facility]   8/14/2017 3:00 PM Dennis Lopez PT [facility]   8/16/2017 11:00 AM David Subramanian PT [facility]   8/18/2017 10:00 AM David Subramanian, PT [facility]   12/15/2017 11:20 AM Hemal Tristan MD [address] [place] [address]

## 2017-07-31 ENCOUNTER — HOSPITAL ENCOUNTER (OUTPATIENT)
Dept: PHYSICAL THERAPY | Age: 67
Discharge: HOME | End: 2017-07-31
Payer: MEDICARE

## 2017-07-31 PROCEDURE — 97110 THERAPEUTIC EXERCISES: CPT

## 2017-07-31 PROCEDURE — 97032 APPL MODALITY 1+ESTIM EA 15: CPT

## 2017-07-31 PROCEDURE — 97140 MANUAL THERAPY 1/> REGIONS: CPT

## 2017-07-31 NOTE — PROGRESS NOTES
PT DAILY TREATMENT NOTE - [facility] 3-16    Patient Name: Arlen Rao  Date:2017  : 1950  [x]  Patient  Verified  Payor: [place] MED[place]ARE / Plan: [place] MEDICARE PART A & B / Product Type: Medicare /    In time:400  Out time:458  Total Treatment Time (min): 53  Total Timed Codes (min): 43  1:1 Treatment Time [facility] only):38  Visit #: 8 of 10    Treatment Area: Low back pain [M54.5]    SUBJECTIVE  Pain Level (0-10 scale): 5  Any medication changes, allergies to medications, adverse drug reactions, diagnosis change, or new procedure performed?: [x] No    [] Yes (see summary sheet for update)  Subjective functional status/changes:   [] No changes reported  Reports continued pain L LS/SIJ and pulling into the L glut/piriformis.     OBJECTIVE    Modality rationale: decrease inflammation, decrease pain and increase tissue extensibility to improve the patients ability to aid with increase tolerance to ADLS and activities   Min Type Additional Details    [] Estim:  []Unatt       []IFC  []Premod                        []Other:  []w/ice   []w/heat  Position:  Location:   10 [x] Estim: [x]Att    []TENS instruct  []NMES                    [x]Other: LTO []w/US   []w/ice   []w/heat  Position:prone  Location:L SIJ, LS, Superior glut and pirifornis    []  Traction: [] Cervical       []Lumbar                       [] Prone          []Supine                       []Intermittent   []Continuous Lbs:  [] before manual  [] after manual    []  Ultrasound: []Continuous   [] Pulsed                           []1MHz   []3MHz Location:  W/cm2:    []  Iontophoresis with dexamethasone         Location: [] Take home patch   [] In clinic   10 [x]  Ice post    []  heat  []  Ice massage  []  Laser   []  Anodyne Position:prone  Location: B LS    []  Laser with stim  []  Other: Position:  Location:    []  Vasopneumatic Device Pressure:       [] lo [] med [] hi   Temperature: [] lo [] med [] hi   [] Skin assessment post-treatment:  []intact []redness- no adverse reaction    []redness  adverse reaction:      min []Eval                  []Re-Eval       23 min Therapeutic Exercise:  [x] See flow sheet :   Rationale: increase ROM, increase strength and improve coordination to improve the patients ability to aid with increase tolerance to ADLS and activities     min Therapeutic Activity:  []  See flow sheet :   Rationale:   to improve the patients ability to       min Neuromuscular Re-education:  []  See flow sheet :   Rationale:   to improve the patients ability to     10 min Manual Therapy:  STM DTM TPR L LS/SIJ/superior glut and piriformis   Rationale: decrease pain, increase ROM, increase tissue extensibility and decrease trigger points to aid with increase tolerance to ADLs and activities     min Gait Training:  ___ feet with ___ device on level surfaces with ___ level of assist   Rationale: With   [] TE   [] TA   [] neuro   [] other: Patient Education: [x] Review HEP    [] Progressed/Changed HEP based on:   [] positioning   [] body mechanics   [] transfers   [] heat/ice application    [] other:      Other Objective/Functional Measures:VC exercises and technique    Pain Level (0-10 scale) post treatment:2-3    ASSESSMENT/Changes in Function: tolerated well with residual pain and STC, TTP L SIJ and glut/piriformis    Patient will continue to benefit from skilled PT services to modify and progress therapeutic interventions, address functional mobility deficits, address ROM deficits, address strength deficits, analyze and address soft tissue restrictions, analyze and cue movement patterns, analyze and modify body mechanics/ergonomics, assess and modify postural abnormalities and instruct in home and community integration to attain remaining goals.      [x]  See Plan of Care  []  See progress note/recertification  []  See Discharge Summary         Progress towards goals / Updated goals:   1.  Pt will be compliant and independent with HEP in order to facilitate PT sessions and aid with self management                        Eval Status:  Initiated                         TIXJPSN Status: progressing 7/17/17 7/21/17  2.  Pt to tolerate 30 min or more of TE and/or Interventions w/o increased s/s                        Eval Status:  Initiated                         JIVBRUJ Status: 21 7/17/17   27 7/21/17          Long Term Goals: To be accomplished in 18 treatments:  1.  Pt will report 50% improvement or better with involvement to show a significant increase in function                        Eval Status:  Initiated                         PFVPONA Status: No significant changes noted 7/24/17  2.  Pt will demonstrate the ability to stoop and lift 25# to allow her to tolerate light lifting with house work                        Eval Status:  Initiated                         KYPEITH Status: Initiated 7/21/17  3.  Pt will demonstrate push/pull of a 100# sled for 10 feet with little to no difficulty to allow her to move furniture with her daily cleaning                        Eval Status:  Initiated                         IONCUVM Status: Initiated 7/21/17  4.  Pt will improve FOTO score to 59 in 12 visits to show significant improvement in function for progress to performing usual work with little to no difficulty                        Eval Status: 36                         Current Status: 57 7/21/17       PLAN  [x]  Upgrade activities as tolerated     [x]  Continue plan of care  []  Update interventions per flow sheet       []  Discharge due to:_  []  Other:_      Rocio Saleh, PT 7/31/2017  4:52 PM

## 2017-08-02 ENCOUNTER — HOSPITAL ENCOUNTER (OUTPATIENT)
Dept: PHYSICAL THERAPY | Age: 67
Discharge: HOME | End: 2017-08-02
Payer: MEDICARE

## 2017-08-02 PROCEDURE — 97110 THERAPEUTIC EXERCISES: CPT

## 2017-08-02 NOTE — PROGRESS NOTES
PT DAILY TREATMENT NOTE - [facility]    Patient Name: Bernarda Em  Date:2017  : 1950  [x]  Patient  Verified  Payor: [place] MEDICARE / Plan: [place] MEDICARE PART A & B / Product Type: Medicare /    In time:828  Out time:903  Total Treatment Time (min): 35  Total Timed Codes (min):35  1:1 Treatment Time ( [address] only): 8  Visit #: 9 of 10    Treatment Area: Low back pain [M54.5]    SUBJECTIVE  Pain Level (0-10 scale):very little  Any medication changes, allergies to medications, adverse drug reactions, diagnosis change, or new procedure performed?: [x] No    [] Yes (see summary sheet for update)  Subjective functional status/changes:   [] No changes reported  I am doing good today, just a little sore    OBJECTIVE    Modality rationale: PD modalities and CP today    Min Type Additional Details    [] Estim:  []Unatt       []IFC  []Premod                        []Other:  []w/ice   []w/heat  Position:  Location:    [] Estim: []Att    []TENS instruct  []NMES                    []Other:  []w/US   []w/ice   []w/heat  Position:  Location:    []  Traction: [] Cervical       []Lumbar                       [] Prone          []Supine                       []Intermittent   []Continuous Lbs:  [] before manual  [] after manual    []  Ultrasound: []Continuous   [] Pulsed                           []1MHz   []3MHz Location:  W/cm2:    []  Iontophoresis with dexamethasone         Location: [] Take home patch   [] In clinic    []  Ice     []  heat  []  Ice massage  []  Laser   []  Anodyne Position:  Location:    []  Laser with stim  []  Other: Position:  Location:    []  Vasopneumatic Device Pressure:       [] lo [] med [] hi   Temperature: [] lo [] med [] hi   [] Skin assessment post-treatment:  []intact []redness- no adverse reaction    []redness  adverse reaction:       min []Eval                  []Re-Eval       35 min Therapeutic Exercise:  [x] See flow sheet :   Rationale: increase ROM, increase strength and improve coordination to improve the patients ability to aid with increase tolerance to ADLs and activities     min Therapeutic Activity:  []  See flow sheet :   Rationale:   to improve the patients ability to       min Neuromuscular Re-education:  []  See flow sheet :   Rationale:   to improve the patients ability to      min Manual Therapy:     Rationale:  to      min Gait Training:  ___ feet with ___ device on level surfaces with ___ level of assist   Rationale: With   [] TE   [] TA   [] neuro   [] other: Patient Education: [x] Review HEP    [] Progressed/Changed HEP based on:   [] positioning   [] body mechanics   [] transfers   [] heat/ice application    [] other:      Other Objective/Functional Measures: VC exercises and technique     Pain Level (0-10 scale) post treatment: 0    ASSESSMENT/Changes in Function: decrease pain upon arrival today. Patient will continue to benefit from skilled PT services to modify and progress therapeutic interventions, address functional mobility deficits, address ROM deficits, address strength deficits, analyze and address soft tissue restrictions, analyze and cue movement patterns, analyze and modify body mechanics/ergonomics, assess and modify postural abnormalities and instruct in home and community integration to attain remaining goals.      [x]  See Plan of Care  []  See progress note/recertification  []  See Discharge Summary         Progress towards goals / Updated goals:   Progress towards goals / Updated goals:   1.  Pt will be compliant and independent with HEP in order to facilitate PT sessions and aid with self management                        Eval Status:  Initiated                         BUJREKP Status: progressing 7/17/17 7/21/17  2.  Pt to tolerate 30 min or more of TE and/or Interventions w/o increased s/s                        Eval Status:  Initiated                         DJOFYNI Status: 21 7/17/17   27 7/21/17          Long Term Goals: To be accomplished in 18 treatments:  1.  Pt will report 50% improvement or better with involvement to show a significant increase in function                        Eval Status:  Initiated                         OOZIKUV Status: reports improvement as of today, no % given 8/2/17  2.  Pt will demonstrate the ability to stoop and lift 25# to allow her to tolerate light lifting with house work                        Eval Status:  Initiated                         Current Status: Initiated 7/21/17  3.  Pt will demonstrate push/pull of a 100# sled for 10 feet with little to no difficulty to allow her to move furniture with her daily cleaning                        Eval Status:  Initiated                         AZZWNRJ Status: Initiated 7/21/17  4.  Pt will improve FOTO score to 59 in 12 visits to show significant improvement in function for progress to performing usual work with little to no difficulty                        Eval Status: 36                         Current Status: 57 7/21/17    Recheck next session 8/2/17          PLAN  [x]  Upgrade activities as tolerated     [x]  Continue plan of care  []  Update interventions per flow sheet       []  Discharge due to:_  [x]  Other:_  RECHECK Victor Manuel 5 10th visit note    Lion Avila, PT 8/2/2017  8:42 AM

## 2017-08-04 ENCOUNTER — HOSPITAL ENCOUNTER (OUTPATIENT)
Dept: LAB | Age: 67
Discharge: HOME | End: 2017-08-04
Payer: MEDICARE

## 2017-08-04 PROCEDURE — 88305 TISSUE EXAM BY PATHOLOGIST: CPT | Performed by: SPECIALIST

## 2017-08-04 PROCEDURE — 88307 TISSUE EXAM BY PATHOLOGIST: CPT | Performed by: SPECIALIST

## 2017-08-07 ENCOUNTER — APPOINTMENT (OUTPATIENT)
Dept: PHYSICAL THERAPY | Age: 67
End: 2017-08-07
Payer: MEDICARE

## 2017-08-09 ENCOUNTER — HOSPITAL ENCOUNTER (OUTPATIENT)
Dept: PHYSICAL THERAPY | Age: 67
Discharge: HOME | End: 2017-08-09
Payer: MEDICARE

## 2017-08-09 PROCEDURE — G8978 MOBILITY CURRENT STATUS: HCPCS

## 2017-08-09 PROCEDURE — 97032 APPL MODALITY 1+ESTIM EA 15: CPT

## 2017-08-09 PROCEDURE — 97110 THERAPEUTIC EXERCISES: CPT

## 2017-08-09 PROCEDURE — G8979 MOBILITY GOAL STATUS: HCPCS

## 2017-08-09 NOTE — PROGRESS NOTES
[facility]  [address], [address] [address],[place] [address]  [phone] fax    Medicare Progress Report    Patient name: Jovany Olsen Start of Care: 17   Referral source: Pinky Huff MD : 1950   Medical/Treatment Diagnosis: Low back pain [M54.5] Onset Date:2017     Prior Hospitalization: see medical history Provider#: [id number]   Medications: Verified on Patient Summary List    Comorbidities:  HTN, Thyroid problems  Prior Level of Function:Able to dance with friends, do house work, lift a case of water and able to do gardening    Visits from [place] of Care: 10    Missed Visits: 1    Reporting Period: 17 to 17    Subjective Reports: doing alright, hurting today because I have been in the bed the past 3-4 days because of a procedure I had. Current Status/ treatment goals Objective measures   1. Pt will be compliant and independent with HEP in order to facilitate PT sessions and aid with self management            [x] met                 [] not met  [] progressing     2. Pt will report 50% improvement or better with involvement to show a significant increase in function                  [] met                 [] not met  [x] progressing 30-40%   3. Pt will demonstrate the ability to stoop and lift 25# to allow her to tolerate light lifting with house work             [] met                 [x] not met  [x] progressing 10#, 10X  7/21/17   4. Pt will improve FOTO score to 59 in 12 visits to show significant improvement in function for progress to performing usual work with little to no difficulty              [] met                 [] not met  [x] progressing 57 on 17     Key functional changes: increased tolerance to exercises,       Problems/ barriers to goal attainment: residual B SIJ pain today    Assessment / Recommendations:Patient demonstrates the potential to make further gains with improving ROM, strength, endurance/activity tolerance, functional FOTO survey score   and all within a reasonable time frame so as to further increase their functional independence with ADLs and activities for carryover to  Improved quality of life and tolerance to household chores. Patient requires skilled Physical Therapy so as to monitor their response to and modify their treatment plan accordingly. Patient appears to be an appropriate candidate for skilled outpatient Physical Therapy. Problem List: pain affecting function, decrease ROM, decrease strength, decrease ADL/ functional abilitiies, decrease activity tolerance, decrease flexibility/ joint mobility and other FOTO 57   Treatment Plan: Therapeutic exercise, Therapeutic activities, Neuromuscular re-education, Physical agent/modality, Manual therapy and Patient education    Patient Goal (s) has been updated and includes: further decrease the pain     Updated Goals to be accomplished in 8 treatments:  From initial plan   1. Pt will report 60% improvement or better with involvement to show a significant increase in function                       PN 30-40%                         Current Status:  2. Pt will demonstrate the ability to stoop and lift 15# to allow her to tolerate light lifting with house work                        PN 10# 10X                         Current Status:  3. Pt will improve FOTO score to 64 in 18 visits to show significant improvement in function for progress to performing usual work with little to no difficulty                      PN 57                         Current Status:    Frequency / Duration: Patient to be seen 2-3 times per week for 8 treatments   G-Codes (GP)  Mobility  [id number] Current  CL= 60-79%   Goal  CK= 40-59%     The severity rating is based on clinical judgment and the FOTO score.         Karolyn Cooper, PT 8/9/2017 9:19 AM

## 2017-08-09 NOTE — PROGRESS NOTES
PT DAILY TREATMENT NOTE - [facility] 3-16    Patient Name: Aleah Vargas  Date:2017  : 1950  [x]  Patient  Verified  Payor: [place] MEDICARE / Plan: [place] MEDICARE PART A & B / Product Type: Medicare /    In time:930  Out time:1018  Total Treatment Time (min): 40  Total Timed Codes (min): 30  1:1 Treatment Time ([facility] only): 23  Visit #: 10 of 18 per POC    Treatment Area: Low back pain [M54.5]    SUBJECTIVE  Pain Level (0-10 scale): 4-5  Any medication changes, allergies to medications, adverse drug reactions, diagnosis change, or new procedure performed?: [x] No    [] Yes (see summary sheet for update)  Subjective functional status/changes:   [] No changes reported  She states she is really sore today, had  To have a D and C and a cone biopsy and has been in bed 3-4 days taking it easy. OBJECTIVE    Modality rationale: decrease inflammation, decrease pain and increase tissue extensibility to improve the patients ability to aid with increase tolerance to ADLS and activities.    Min Type Additional Details    [] Estim:  []Unatt       []IFC  []Premod                        []Other:  []w/ice   []w/heat  Position:  Location:   10 [x] Estim: [x]Att    []TENS instruct  []NMES                    [x]Other: LTO []w/US   []w/ice   []w/heat  Position:Prone  Location:B LS  SIJ    []  Traction: [] Cervical       []Lumbar                       [] Prone          []Supine                       []Intermittent   []Continuous Lbs:  [] before manual  [] after manual    []  Ultrasound: []Continuous   [] Pulsed                           []1MHz   []3MHz Location:  W/cm2:    []  Iontophoresis with dexamethasone         Location: [] Take home patch   [] In clinic   10 [x]  Ice post    []  heat  []  Ice massage  []  Laser   []  Anodyne Position:prone  Location:B LS    []  Laser with stim  []  Other: Position:  Location:    []  Vasopneumatic Device Pressure:       [] lo [] med [] hi   Temperature: [] lo [] med [] hi   [] Skin assessment post-treatment:  []intact []redness- no adverse reaction    []redness  adverse reaction:      min []Eval                  []Re-Eval       20 min Therapeutic Exercise:  [x] See flow sheet :   Rationale: increase ROM, increase strength and improve coordination to improve the patients ability to aid with increase tolerance to ADLS and activities     min Therapeutic Activity:  []  See flow sheet :   Rationale:   to improve the patients ability to       min Neuromuscular Re-education:  []  See flow sheet :   Rationale:   to improve the patients ability to      min Manual Therapy:     Rationale:  to      min Gait Training:  ___ feet with ___ device on level surfaces with ___ level of assist   Rationale: With   [] TE   [] TA   [] neuro   [] other: Patient Education: [x] Review HEP    [] Progressed/Changed HEP based on:   [] positioning   [] body mechanics   [] transfers   [] heat/ice application    [] other:      Other Objective/Functional Measures: VC exercises and tech. Pain Level (0-10 scale) post treatment: <4    ASSESSMENT/Changes in Function: tolerated well with some pain increase related to her recent procedure. Patient will continue to benefit from skilled PT services to modify and progress therapeutic interventions, address functional mobility deficits, address ROM deficits, address strength deficits, analyze and address soft tissue restrictions, analyze and cue movement patterns, analyze and modify body mechanics/ergonomics, assess and modify postural abnormalities and instruct in home and community integration to attain remaining goals.      [x]  See Plan of Care  []  See progress note/recertification  []  See Discharge Summary         Progress towards goals / Updated goals:  1.  Pt will be compliant and independent with HEP in order to facilitate PT sessions and aid with self management                        Eval Status:  Initiated                         [facility] Status: progressing 7/17/17    7/21/17 8/9/17  2.  Pt to tolerate 30 min or more of TE and/or Interventions w/o increased s/s                        Eval Status:  Initiated                         MUXATUM Status: 21 7/17/17   27 7/21/17   20 8/9/17          Long Term Goals: To be accomplished in 18 treatments:  1.  Pt will report 50% improvement or better with involvement to show a significant increase in function                        Eval Status:  Initiated                         WRPDEPY Status: 30-40% 8/9/17  2.  Pt will demonstrate the ability to stoop and lift 25# to allow her to tolerate light lifting with house work                        Eval Status:  Initiated                         Current Status: Initiated 7/21/17  3.  Pt will demonstrate push/pull of a 100# sled for 10 feet with little to no difficulty to allow her to move furniture with her daily cleaning                        Eval Status:  Initiated                         IOEUOGE Status: Initiated 7/21/17  4.  Pt will improve FOTO score to 59 in 12 visits to show significant improvement in function for progress to performing usual work with little to no difficulty                        Eval Status: 36                         Current Status: 57 7/21/17             PLAN  [x]  Upgrade activities as tolerated     [x]  Continue plan of care  []  Update interventions per flow sheet       []  Discharge due to:_  [x]  Other:send 10th visit note_     Recheck FOTO next session    Karolyn Cooper, PT 8/9/2017  9:19 AM

## 2017-08-11 ENCOUNTER — HOSPITAL ENCOUNTER (OUTPATIENT)
Dept: PHYSICAL THERAPY | Age: 67
Discharge: HOME | End: 2017-08-11
Payer: MEDICARE

## 2017-08-11 PROCEDURE — 97032 APPL MODALITY 1+ESTIM EA 15: CPT

## 2017-08-11 PROCEDURE — 97140 MANUAL THERAPY 1/> REGIONS: CPT

## 2017-08-11 NOTE — PROGRESS NOTES
PT DAILY TREATMENT NOTE - [facility] 3-16    Patient Name: Steve Watkins  Date:2017  : 1950  [x]  Patient  Verified  Payor: [place] MEDICARE / Plan: [place] MEDICARE PART A & B / Product Type: Medicare /    In time:1102  Out time:1150  Total Treatment Time (min): 48  Total Timed Codes (min): 38  1:1 Treatment Time ( [address] only): 38   Visit #: 11 of 18    Treatment Area: Low back pain [M54.5]    SUBJECTIVE  Pain Level (0-10 scale): 5  Any medication changes, allergies to medications, adverse drug reactions, diagnosis change, or new procedure performed?: [x] No    [] Yes (see summary sheet for update)  Subjective functional status/changes:   [] No changes reported  Pain is hurting across the low back. The laser seemed to help last time. Had a procedure on her uterus that resulted in abdominal and LBP a week ago.   Feels better when she lays totally flat    OBJECTIVE  Modality rationale: decrease inflammation, decrease pain and increase tissue extensibility to improve the patients ability to tolerate increased activity levels   Min Type Additional Details    [] Estim:  []Unatt       []IFC  []Premod                        []Other:  []w/ice   []w/heat  Position:  Location:   10 [x] Estim: [x]Att    []TENS instruct  []NMES                    [x]Other: LTO []w/US   []w/ice   []w/heat  Position: Prone  Location: (B) Glute/SI Jt/Lx paraspinals    []  Traction: [] Cervical       []Lumbar                       [] Prone          []Supine                       []Intermittent   []Continuous Lbs:  [] before manual  [] after manual    []  Ultrasound: []Continuous   [] Pulsed                           []1MHz   []3MHz Location:  W/cm2:    []  Iontophoresis with dexamethasone         Location: [] Take home patch   [] In clinic   10 [x]  Ice     []  heat  []  Ice massage  []  Laser   []  Anodyne Position: Prone  Location: Lx/superior Glute    []  Laser with stim  []  Other: Position:  Location:    []  Vasopneumatic Device Pressure: [] lo [] med [] hi   Temperature: [] lo [] med [] hi   [x] Skin assessment post-treatment:  [x]intact []redness- no adverse reaction    []redness  adverse reaction:     28 min Manual Therapy:  (B) Innominat P/A mobs, (B) Lx rot mobs/ (B) LE LAD, STM/DTM (B) Sup glute/PSIS/Lx  region   Rationale: decrease pain, increase ROM, increase tissue extensibility and decrease trigger points to tolerate increased activity levels      With   [x] TE   [] TA   [] neuro   [] other: Patient Education: [x] Review HEP    [] Progressed/Changed HEP based on:   [] positioning   [] body mechanics   [] transfers   [] heat/ice application    [] other:      Other Objective/Functional Measures:   - Decr mobility (B) Innominate with stork stance  - Tight Lx Jt mobility with manual assistance  - TTP (B) SI Jt/Sup Glute  - Pain with Manual mobilization of the Lx spine in rotation  FOTO = 42     Pain Level (0-10 scale) post treatment: 2    ASSESSMENT/Changes in Function:      Patient will continue to benefit from skilled PT services to modify and progress therapeutic interventions, address functional mobility deficits, address ROM deficits, address strength deficits, analyze and address soft tissue restrictions, analyze and cue movement patterns and analyze and modify body mechanics/ergonomics to attain remaining goals.      []  See Plan of Care  []  See progress note/recertification  []  See Discharge Summary         Updated Goals to be accomplished in 8 treatments:  From initial plan   1.  Pt will report 60% improvement or better with involvement to show a significant increase in function                       PN 30-40%                         Current Status:  2.  Pt will demonstrate the ability to stoop and lift 15# to allow her to tolerate light lifting with house work                        RR 10# 10X                         Current Status:  3.   Pt will improve FOTO score to 64 in 18 visits to show significant improvement in function for progress to performing usual work with little to no difficulty                      PN 57                         Current Status:  PLAN  [x]  Upgrade activities as tolerated     [x]  Continue plan of care  []  Update interventions per flow sheet       []  Discharge due to:_  []  Other:_      Jose Oneill, PT 8/11/2017  11:05 AM    Future Appointments  Date Time Provider Rosette Lydia   8/14/2017 3:00 PM Leverne Kocher, PT [facility]   8/16/2017 11:00 AM Jose Oneill PT [facility]   8/18/2017 10:00 AM Jose Oneill PT [facility]   8/21/2017 2:30 PM Crystal Perdomo  [address]   8/28/2017 10:30 AM Bev Caballero MD [facility]   12/15/2017 11:20 AM Shen Albright  [facility]

## 2017-08-14 ENCOUNTER — APPOINTMENT (OUTPATIENT)
Dept: PHYSICAL THERAPY | Age: 67
End: 2017-08-14
Payer: MEDICARE

## 2017-08-18 ENCOUNTER — APPOINTMENT (OUTPATIENT)
Dept: PHYSICAL THERAPY | Age: 67
End: 2017-08-18
Payer: MEDICARE

## 2017-08-21 ENCOUNTER — DOCUMENTATION ONLY (OUTPATIENT)
Dept: ORTHOPEDIC SURGERY | Age: 67
End: 2017-08-21

## 2017-08-21 ENCOUNTER — OFFICE VISIT (OUTPATIENT)
Dept: ORTHOPEDIC SURGERY | Age: 67
End: 2017-08-21

## 2017-08-21 VITALS
DIASTOLIC BLOOD PRESSURE: 65 MMHG | HEART RATE: 92 BPM | TEMPERATURE: 98.2 F | WEIGHT: 161.8 LBS | BODY MASS INDEX: 29.77 KG/M2 | OXYGEN SATURATION: 98 % | HEIGHT: 62 IN | RESPIRATION RATE: 18 BRPM | SYSTOLIC BLOOD PRESSURE: 111 MMHG

## 2017-08-21 DIAGNOSIS — M53.3 PAIN OF LEFT SACROILIAC JOINT: Primary | Chronic | ICD-10-CM

## 2017-08-21 RX ORDER — GABAPENTIN 300 MG/1
300 CAPSULE ORAL
Qty: 30 CAP | Refills: 1 | Status: SHIPPED | OUTPATIENT
Start: 2017-08-21 | End: 2017-10-23

## 2017-08-21 RX ORDER — LEVOTHYROXINE SODIUM 112 UG/1
150 TABLET ORAL
COMMUNITY
End: 2021-08-02

## 2017-08-21 RX ORDER — BUPIVACAINE HYDROCHLORIDE 2.5 MG/ML
0.5 INJECTION, SOLUTION INFILTRATION; PERINEURAL ONCE
Qty: 0.5 ML | Refills: 0
Start: 2017-08-21 | End: 2017-08-21

## 2017-08-21 RX ORDER — LIDOCAINE HYDROCHLORIDE 20 MG/ML
0.5 INJECTION, SOLUTION EPIDURAL; INFILTRATION; INTRACAUDAL; PERINEURAL ONCE
Qty: 0.5 ML | Refills: 0
Start: 2017-08-21 | End: 2017-08-21

## 2017-08-21 RX ORDER — BETAMETHASONE SODIUM PHOSPHATE AND BETAMETHASONE ACETATE 3; 3 MG/ML; MG/ML
12 INJECTION, SUSPENSION INTRA-ARTICULAR; INTRALESIONAL; INTRAMUSCULAR; SOFT TISSUE ONCE
Qty: 2 ML | Refills: 0
Start: 2017-08-21 | End: 2017-08-21

## 2017-08-21 NOTE — PROGRESS NOTES
Verbal order entered per Dr. Denver Harlem as documented on blue sheet:Left iliolumbar trigger point 2mL Celestone, 0.5mL Marcaine, 0.5ml Lidocaine. Gabapentin 300mg take 1 tab po QHS prn for nerve pain. Disp 30 with 1 refill.

## 2017-08-21 NOTE — PROGRESS NOTES
Patient evaluated in office today. Has primarily SIJ pain. Please focus on pelvic stabilization ex once cleared by gyn.

## 2017-08-21 NOTE — PROGRESS NOTES
[address]  [address], [address]  [facility], [address]  Phone: [phone]  Fax: [phone]        Lex Devlin  : 1950  PCP: Josiane White MD      NEW PATIENT      ASSESSMENT AND PLAN     Diagnoses and all orders for this visit:    1. Pain of left sacroiliac joint-biomechanical  -     bupivacaine (MARCAINE) 0.25 % (2.5 mg/mL) soln injection; 0.5 mL by SubCUTAneous route once for 1 dose. -     INJECTION, BUPIVICAINE HYDRO  -     LIDOCAINE INJECTION  -     lidocaine, PF, (XYLOCAINE) 20 mg/mL (2 %) injection; 0.5 mL by Other route once for 1 dose. -     betamethasone (CELESTONE SOLUSPAN) 6 mg/mL injection; 2 mL by IntraMUSCular route once for 1 dose.  -     BETAMETHASONE ACETATE & SODIUM PHOSPHATE INJECTION 6 MG  -     INJECT TRIGGER POINT, 1 OR 2    Other orders  -     gabapentin (NEURONTIN) 300 mg capsule; Take 1 Cap by mouth nightly as needed. 1. TPI in the office today. 2. Advised to stay active as tolerated. 3. Rx for Gabapentin. 4. Continue physical therapy. Sent message to [email or web address], PT  5.  SI joint block if pain persists      Follow-up Disposition: Not on [address] [place] [address]  is seen today in consultation at the request of Dr. Mary Jo Neely for complaints of LT low back pain x 8 months. HISTORY OF PRESENT ILLNESS  Shin Darden is a 78 y.o. female. Pt denies any specific incident or injury that caused their pain. She has been to physical therapy, had benefit with US and laser therapy. Pt notes that she had to stop her physical therapy after two weeks due to having a cone biopsy. Her pain radiates into her LT buttock. Pt does get occasional RT buttock pain. She had an in-office injection from her PCP with some benefit, pt unsure of this was Toradol or cortisone. She notes that she will have a burning sensation at times in her LT low back and LT buttock, no sciatic symptoms.  She notes that her pain has gotten better mildly. She will go back to physical therapy after she is cleared to do so. No weakness in BLE. No saddle paresthesia. She takes PRN Aleve with benefit. Reports some GI issues with Aleve. Denies persistent fevers, chills, weight changes, neurogenic bowel or bladder symptoms. Pt denies recent ED visits or hospitalizations. Pt denies any recent fevers, chills, antibiotics, recent cortisone injections, or infections. MRI reviewed. Has no lumbar pain. Pain Assessment  8/21/2017   Location of Pain Back   Severity of Pain 6   Quality of Pain Aching   Quality of Pain Comment stabbing   Frequency of Pain Constant   Aggravating Factors Walking;Bending   Relieving Factors Heat;Ice;Rest           MRI Results (most recent):    Results from Hospital Encounter encounter on 07/10/17   MRI LUMB SPINE WO CONT   Narrative MRI LUMBAR SPINE     CPT CODE: 78559    INDICATION: Low back pain. Left leg pain x1 year. .    TECHNIQUE: MRI of the lumbar spine performed consisting of sagittal T1, T2 and  inversion recovery sequences with additional axial T2 sequence and oblique axial  T1 sequence. COMPARISON: Plain films 6/21/2017. FINDINGS:     Sagittal images show normal alignment. There is no compression deformity or  edema. No listhesis. Minimally high signal involving L1 on the T2 and T1  sequences with low signal on inversion recovery suggests hemangioma. . Cauda  equina tapers at L1. No canal mass. There is mild disc space narrowing at L3-4  with broad-based disc bulge. There is right base disc bulge or protrusion at  L4-5 and L5-S1. 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    Correlation with axial images shows:    L1-2:  Minimal disc bulge. No significant canal narrowing. There is mild right  foraminal encroachment. L2-3:  No significant canal or foraminal narrowing. Mild disc bulge. L3-4:  Diffuse disc bulge.  There is mild lateral recess narrowing bilaterally,  with mild-to-moderate bilateral foraminal encroachment due to disc bulge and  some minor facet hypertrophy. Ligamentous thickening posterior up to 3.5 mm. L4-5:  Diffuse disc bulge with slightly more pronounced broad-based central to  right lateral component effaces fluid space and slightly distorts the thecal  sac. The central canal measures 10 mm diameter. There is mild ligamentous  thickening and mild bilateral facet hypertrophy. Mild left and moderate right  foraminal encroachment results. L5-S1:  Diffuse asymmetric disc bulge, with mild degenerative facet disease and  hypertrophy, right greater than left. Central canal is only slightly narrowed to  11 mm. No significant stenosis. There is mild to moderate right and moderate to  significant left-sided foraminal narrowing due to facet hypertrophy and disc  bulge. There appears to be contact of the exiting nerve root on the left by disc  material, sagittal image 2 of series 3. Impression IMPRESSION:    1.  Multilevel degenerative discogenic and facet disease as above, with mild  canal but more significant degree of variable neural foraminal encroachment at  several levels as outlined above  -Most significant right L4-5 and left L5-S1 as above. 2. No vertebral body compression deformity, edema or listhesis. PAST MEDICAL HISTORY   Past Medical History:   Diagnosis Date    Acquired ventricular hypertrophy     Allergic rhinitis     Bladder incontinence     Cardiac echocardiogram 05/11/2016    On-Sight Ultrasound:  EF 48%. No RWMA on segments seen. Mild conc LVH. Gr 1 DDfx. Normal RVSP. Mild TR.  Cardiac nuclear imaging test, low risk 06/15/2016    Low risk. No ischemia or prior infarction. No RWMA. EF 71%. Neg EKG on maximal EST. Ex time 8 min 45 sec.     Cardiomyopathy ([address]     Cardiovascular disease     Esophageal reflux     Essential hypertension     Hyperlipidemia     Numerous moles     Thyroid disease     Vitamin D deficiency        Past Surgical History:   Procedure Laterality Date    HX CATARACT REMOVAL         MEDICATIONS      Current Outpatient Prescriptions   Medication Sig Dispense Refill    levothyroxine (SYNTHROID) 112 mcg tablet Take  by mouth.  pitavastatin calcium (LIVALO) 1 mg tab tablet Take  by mouth.  gabapentin (NEURONTIN) 300 mg capsule Take 1 Cap by mouth nightly as needed. 30 Cap 1    omega-3 fatty acids-vitamin e 1,000 mg cap Take 1 Cap by mouth two (2) times a day.  esomeprazole (NEXIUM) 40 mg capsule Take 40 mg by mouth daily.  telmisartan-hydrochlorothiazide (MICARDIS HCT) 40-12.5 mg per tablet Take 1 Tab by mouth daily.  ergocalciferol (ERGOCALCIFEROL) 50,000 unit capsule Take 50,000 Units by mouth every seven (7) days.  Cetirizine 10 mg cap Take 10 mg by mouth daily as needed.  cyanocobalamin 1,000 mcg tablet Take 1,000 mcg by mouth daily. ALLERGIES  No Known Allergies       SOCIAL HISTORY    Social History     Social History    Marital status:      Spouse name: N/A    Number of children: N/A    Years of education: N/A     Occupational History    Not on file. Social History Main Topics    Smoking status: Former Smoker    Smokeless tobacco: Never Used    Alcohol use No    Drug use: No    Sexual activity: Not on file     Other Topics Concern    Not on file     Social History Narrative       FAMILY HISTORY    Family History   Problem Relation Age of Onset    Heart Attack Brother     High Cholesterol Father     Arthritis-osteo Father     Breast Cancer Sister          REVIEW OF SYSTEMS  Review of Systems   Constitutional: Negative for chills, fever and weight loss. Respiratory: Negative for shortness of breath. Cardiovascular: Negative for chest pain. Gastrointestinal: Negative for constipation. Negative for fecal incontinence   Genitourinary: Negative for dysuria.         Negative for urinary incontinence   Musculoskeletal:        Per HPI   Skin: Negative for rash. Neurological: Negative for dizziness, tingling, tremors, focal weakness and headaches. Endo/Heme/Allergies: Does not bruise/bleed easily. Psychiatric/Behavioral: The patient does not have insomnia. PHYSICAL EXAMINATION  Visit Vitals    /65    Pulse 92    Temp 98.2 °F (36.8 °C) (Oral)    Resp 18    Ht 5' 2\" (1.575 m)    Wt 161 lb 12.8 oz (73.4 kg)    SpO2 98%    BMI 29.59 kg/m2          Accompanied by self. Constitutional:  Well developed, well nourished, in no acute distress. Psychiatric: Affect and mood are appropriate. Integumentary: No rashes or abrasions noted on exposed areas. Cardiovascular/Peripheral Vascular: Intact l pulses. No peripheral edema is noted. Lymphatic:  No evidence of lymphedema. No cervical lymphadenopathy. SPINE/MUSCULOSKELETAL EXAM    Lumbar spine:  No rash, ecchymosis. Mild pelvic obliquity. No fasciculations. No focal atrophy is noted. No pain with hip ROM. Tenderness to palpation LT SI joint. No tenderness to palpation at the sciatic notch. Trochanters non tender. Sensation grossly intact to light touch. MOTOR:       Hip Flex  Quads Hamstrings Ankle DF EHL Ankle PF   Right +4/5 +4/5 +4/5 +4/5 +4/5 +4/5   Left +4/5 +4/5 +4/5 +4/5 +4/5 +4/5     Unable to do CHADD maneuver on the LT. Straight Leg raise negative. No difficulty with tandem gait. Heel walk intact. Toe rise intact    Ambulation without assistive device. FWB. [facility]  OFFICE PROCEDURE PROGRESS NOTE      PROCEDURE: In the office today after informed consent using aseptic technique, the patient was injected with a total of 2 cc of Celestone, 0.5 cc each of Lidocaine and Marcaine into her left iliolumbar trigger point.      Chart reviewed for the following:   I, Dr. Teresa Esposito, have reviewed the History, Physical and updated the Allergic reactions for Farn[place]UN[place] [id number] [employer] performed immediately prior to start of procedure:   I, Dr. Myrtle Solomon, have performed the following reviews on [address] prior to the start of the procedure:            * Patient was identified by name and date of birth   * Agreement on procedure being performed was verified  * Risks and Benefits explained to the patient  * Procedure site verified and marked as necessary  * Patient was positioned for comfort  * Consent was signed and verified     Time: 3:38 PM    Date of procedure: 8/22/2017    Procedure performed by:  [address], MD    Provider assisted by: None    Patient assisted by: Self    How tolerated by patient: Pt tolerated the procedure well with no complications. Written by Quentin Mims, as dictated by Myrtle Solomon MD.    I, Dr. Myrtle Solomon MD, confirm that all documentation is accurate. Ms. Maximo Rajput may have a reminder for a \"due or due soon\" health maintenance. I have asked that she contact her primary care provider for follow-up on this health maintenance.

## 2017-08-25 ENCOUNTER — HOSPITAL ENCOUNTER (OUTPATIENT)
Dept: PHYSICAL THERAPY | Age: 67
Discharge: HOME | End: 2017-08-25
Payer: MEDICARE

## 2017-08-25 PROCEDURE — 97032 APPL MODALITY 1+ESTIM EA 15: CPT | Performed by: PHYSICAL THERAPIST

## 2017-08-25 PROCEDURE — 97110 THERAPEUTIC EXERCISES: CPT | Performed by: PHYSICAL THERAPIST

## 2017-08-25 NOTE — PROGRESS NOTES
PT DAILY TREATMENT NOTE - [facility]     Patient Name: Neo Lala  Date:2017  : 1950  [x]  Patient  Verified  Payor: [place] MEDICARE / Plan: [place] MEDICARE PART A & B / Product Type: Medicare /    In time:3:36  Out time:4:10  Total Treatment Time (min): 34  Total Timed Codes (min): 24  1:1 Treatment Time ( [address] only): 24   Visit #: 12 of 18    Treatment Area: Low back pain [M54.5]    SUBJECTIVE  Pain Level (0-10 scale): 0  Any medication changes, allergies to medications, adverse drug reactions, diagnosis change, or new procedure performed?: [x] No    [] Yes (see summary sheet for update)  Subjective functional status/changes:   [] No changes reported  Feels good. Just needs to finish out the last few appointments. OBJECTIVE    Modality rationale: decrease pain to improve the patients ability to complete ADLs   Min Type Additional Details   8 [x] Estim: [x]Att    []TENS instruct  []NMES                    [x]Other: LTO []w/US   []w/ice   []w/heat  Position: pron  Location: L SIJ region   10 [x]  Ice     []  heat  []  Ice massage  []  Laser   []  Anodyne Position: prone  Location: lumbar region   [x] Skin assessment post-treatment:  [x]intact [x]redness- no adverse reaction    []redness  adverse reaction:     16 min Therapeutic Exercise:  [x] See flow sheet :   Rationale: increase ROM, increase strength and decrease pain to improve the patients ability to complete ADLs        With   [] TE   [] TA   [] neuro   [] other: Patient Education: [x] Review HEP    [] Progressed/Changed HEP based on:   [] positioning   [] body mechanics   [] transfers   [] heat/ice application    [] other:         Pain Level (0-10 scale) post treatment: 0    ASSESSMENT/Changes in Function: Patient responds well to treatment session. Minimal difficulty with exercises as prescribed. Appointment limited due to late arrival. Progress as tolerated.  No adverse effects were noted from today's treatment session      Patient will continue to benefit from skilled PT services to modify and progress therapeutic interventions, address functional mobility deficits, address ROM deficits, address strength deficits, analyze and address soft tissue restrictions, analyze and cue movement patterns, analyze and modify body mechanics/ergonomics and assess and modify postural abnormalities to attain remaining goals.      [x]  See Plan of Care  []  See progress note/recertification  []  See Discharge Summary         Progress towards goals / Updated goals:  Updated Goals to be accomplished in 8 treatments:  From initial plan   1.  Pt will report 60% improvement or better with involvement to show a significant increase in function                       PN 30-40%                         Current Status:  2.  Pt will demonstrate the ability to stoop and lift 15# to allow her to tolerate light lifting with house work                        EL 10# 10X                         Current Status:  3.   Pt will improve FOTO score to 64 in 18 visits to show significant improvement in function for progress to performing usual work with little to no difficulty                      PN 57                         Current Status:    PLAN  []  Upgrade activities as tolerated     [x]  Continue plan of care  []  Update interventions per flow sheet       []  Discharge due to:_  []  Other:_      Colin Hardy, PT, DPT 8/25/2017  3:41 PM    Future Appointments  Date Time Provider Rosette Ocampoi   8/28/2017 10:30 AM Milana Alvarez MD [facility]   8/31/2017 8:30 AM [facility] [address]   9/1/2017 12:00 PM [facility] [address]   9/5/2017 3:30 PM Jamaica De La Cruz [facility] [address]   12/15/2017 11:20 AM Hollis Marquez MD [address] [place] [address]

## 2017-08-31 ENCOUNTER — HOSPITAL ENCOUNTER (OUTPATIENT)
Dept: PHYSICAL THERAPY | Age: 67
Discharge: HOME | End: 2017-08-31
Payer: MEDICARE

## 2017-08-31 PROCEDURE — 97032 APPL MODALITY 1+ESTIM EA 15: CPT

## 2017-08-31 PROCEDURE — 97110 THERAPEUTIC EXERCISES: CPT

## 2017-08-31 PROCEDURE — 97140 MANUAL THERAPY 1/> REGIONS: CPT

## 2017-08-31 NOTE — PROGRESS NOTES
PT DAILY TREATMENT NOTE - [facility] 3-16    Patient Name: Miroslava Beckford  Date:2017  : 1950  [x]  Patient  Verified  Payor: Ericalix Jordan / Plan: [place] MED[place] & [place] / Product Type: Medicare /    In time:830  Out time:937  Total Treatment Time (min):51  Total Timed Codes (min)41  1:1 Treatment Time ( [address] only): 41  Visit #:13 of 18    Treatment Area: Low back pain [M54.5]    SUBJECTIVE  Pain Level (0-10 scale): 3 mostly on the Left side  Any medication changes, allergies to medications, adverse drug reactions, diagnosis change, or new procedure performed?: [x] No    [] Yes (see summary sheet for update)  Subjective functional status/changes:   [] No changes reported  I saw the doctor on  Dr. Yaakov Ruiz - she was supposed to send him a message. She gave me an  Injection in the L LB and that helped. She said I have 2 discs that are swollen and inflamed but not fractured. She said I had one hip that is lower than the other.       OBJECTIVE    Modality rationale: decrease inflammation, decrease pain and increase tissue extensibility to improve the patients ability to aid with increase tolerance to ADLS and activities   Min Type Additional Details    [] Estim:  []Unatt       []IFC  []Premod                        []Other:  []w/ice   []w/heat  Position:  Location:   10 [x] Estim: [x]Att    []TENS instruct  []NMES                    [x]Other: LTO []w/US   []w/ice   []w/heat  Position:prone  Location:L SIJ and superior glut    []  Traction: [] Cervical       []Lumbar                       [] Prone          []Supine                       []Intermittent   []Continuous Lbs:  [] before manual  [] after manual    []  Ultrasound: []Continuous   [] Pulsed                           []1MHz   []3MHz Location:  W/cm2:    []  Iontophoresis with dexamethasone         Location: [] Take home patch   [] In clinic   10 [x]  Ice post    []  heat  []  Ice massage  []  Laser   []  Anodyne Position:prone  Location:B LS    []  Laser with stim  []  Other: Position:  Location:    []  Vasopneumatic Device Pressure:       [] lo [] med [] hi   Temperature: [] lo [] med [] hi   [] Skin assessment post-treatment:  []intact []redness- no adverse reaction    []redness  adverse reaction:       min []Eval                  []Re-Eval       10 min Therapeutic Exercise:  [x] See flow sheet :   Rationale: increase ROM, increase strength and improve coordination to improve the patients ability to aid with increase tolerance to ADLS    8 min Therapeutic Activity:  []  See flow sheet :mobility check, MRI review, leg length   Rationale:   to improve the patients ability to       min Neuromuscular Re-education:  []  See flow sheet :   Rationale:   to improve the patients ability to     13 min Manual Therapy:  LAD B LE leg pull in supine, STM TPR DTM L LS/SIJ and superior glut   Rationale: decrease pain, increase ROM, increase tissue extensibility and decrease trigger points to aid with increase tolerance to ADLs     min Gait Training:  ___ feet with ___ device on level surfaces with ___ level of assist   Rationale:           With   [] TE   [] TA   [] neuro   [] other: Patient Education: [x] Review HEP    [] Progressed/Changed HEP based on:   [] positioning   [] body mechanics   [] transfers   [] heat/ice application    [] other:      Other Objective/Functional Measures: B  inonimant mobility with FF and stork test L tighter than R,  TTP STC L>R SIJ   75cm R LE   75.5cm L LE  Pain Level (0-10 scale) post treatment: 1-2    ASSESSMENT/Changes in Function: STC and TTP L SIJ and superior glut, slight LLD,    Patient will continue to benefit from skilled PT services to modify and progress therapeutic interventions, address functional mobility deficits, address ROM deficits, address strength deficits, analyze and address soft tissue restrictions, analyze and cue movement patterns, analyze and modify body mechanics/ergonomics, assess and modify postural abnormalities and instruct in home and community integration to attain remaining goals.      [x]  See Plan of Care  []  See progress note/recertification  []  See Discharge Summary         Progress towards goals / Updated goals:  Updated Goals to be accomplished in 8 treatments:  From initial plan   1.  Pt will report 60% improvement or better with involvement to show a significant increase in function                       PN 30-40%                         Current Status:  2.  Pt will demonstrate the ability to stoop and lift 15# to allow her to tolerate light lifting with house work                        MG 10# 10X                         Current Status:UA due to pain 8/31/17  3.   Pt will improve FOTO score to 64 in 18 visits to show significant improvement in function for progress to performing usual work with little to no difficulty                      PN 57                         Current Status:       PLAN  [x]  Upgrade activities as tolerated     [x]  Continue plan of care  []  Update interventions per flow sheet       []  Discharge due to:_  []  Other:_      Kulwant Reynolds, PT 8/31/2017  8:42 AM

## 2017-09-01 ENCOUNTER — HOSPITAL ENCOUNTER (OUTPATIENT)
Dept: PHYSICAL THERAPY | Age: 67
Discharge: HOME | End: 2017-09-01
Payer: MEDICARE

## 2017-09-01 PROCEDURE — 97110 THERAPEUTIC EXERCISES: CPT

## 2017-09-01 PROCEDURE — 97140 MANUAL THERAPY 1/> REGIONS: CPT

## 2017-09-01 PROCEDURE — 97032 APPL MODALITY 1+ESTIM EA 15: CPT

## 2017-09-01 NOTE — PROGRESS NOTES
PT DAILY TREATMENT NOTE - [facility] 3-16    Patient Name: Baldo Cooper  Date:2017  : 1950  [x]  Patient  Verified  Payor: [place] MEDICARE / Plan: [place] MEDICARE PART A & B / Product Type: Medicare /    In ANK  Out time:1251  Total Treatment Time (min):55  Total Timed Codes (min): 45  1:1 Treatment Time ( [address] only): 39  Visit #: 14 of 18    Treatment Area: Low back pain [M54.5]    SUBJECTIVE  Pain Level (0-10 scale): 3  Any medication changes, allergies to medications, adverse drug reactions, diagnosis change, or new procedure performed?: [x] No    [] Yes (see summary sheet for update)  Subjective functional status/changes:   [] No changes reported  It's about the same. The massage you did the other day did help .      OBJECTIVE    Modality rationale: decrease inflammation, decrease pain and increase tissue extensibility to improve the patients ability to aid with increase tolerance to ADLS and activities   Min Type Additional Details    [] Estim:  []Unatt       []IFC  []Premod                        []Other:  []w/ice   []w/heat  Position:  Location:   10 [x] Estim: [x]Att    []TENS instruct  []NMES                    [x]Other: LTO []w/US   []w/ice   []w/heat  Position:prone  Location:L>R SIJ/piriformis and superior glut    []  Traction: [] Cervical       []Lumbar                       [] Prone          []Supine                       []Intermittent   []Continuous Lbs:  [] before manual  [] after manual    []  Ultrasound: []Continuous   [] Pulsed                           []1MHz   []3MHz Location:  W/cm2:    []  Iontophoresis with dexamethasone         Location: [] Take home patch   [] In clinic   10 [x]  Ice post    []  heat  []  Ice massage  []  Laser   []  Anodyne Position:prone  Location:B LS/SIJ and superior glut    []  Laser with stim  []  Other: Position:  Location:    []  Vasopneumatic Device Pressure:       [] lo [] med [] hi   Temperature: [] lo [] med [] hi   [] Skin assessment post-treatment: []intact []redness- no adverse reaction    []redness  adverse reaction:       min []Eval                  []Re-Eval       22 min Therapeutic Exercise:  [x] See flow sheet :   Rationale: increase ROM, increase strength and improve coordination to improve the patients ability to aid with increase tolerance to ADLs and activities      min Therapeutic Activity:  []  See flow sheet :   Rationale:   to improve the patients ability to       min Neuromuscular Re-education:  []  See flow sheet :   Rationale:   to improve the patients ability to     13 min Manual Therapy:  STM DTM TPR B LS/SIJ/superior glut   Rationale: decrease pain, increase ROM, increase tissue extensibility and decrease trigger points to aid with increase tolerance to ADLs and activities     min Gait Training:  ___ feet with ___ device on level surfaces with ___ level of assist   Rationale: With   [] TE   [] TA   [] neuro   [] other: Patient Education: [x] Review HEP    [] Progressed/Changed HEP based on:   [] positioning   [] body mechanics   [] transfers   [] heat/ice application    [] other:      Other Objective/Functional Measures:VC exercises and technique    Pain Level (0-10 scale) post treatment:0    ASSESSMENT/Changes in Function:plan of care discussed to progress to 18th visit and then DC to attempted self management. Patient will continue to benefit from skilled PT services to modify and progress therapeutic interventions, address functional mobility deficits, address ROM deficits, address strength deficits, analyze and address soft tissue restrictions, analyze and cue movement patterns, analyze and modify body mechanics/ergonomics, assess and modify postural abnormalities and instruct in home and community integration to attain remaining goals.      [x]  See Plan of Care  [x]  See progress note/recertification  []  See Discharge Summary         Progress towards goals / Updated goals:   Updated Goals to be accomplished in 8 treatments:  From initial plan   1.  Pt will report 60% improvement or better with involvement to show a significant increase in function                       PN 30-40%                         Current Status:  2.  Pt will demonstrate the ability to stoop and lift 15# to allow her to tolerate light lifting with house work                        EJ 10# 10X                         Current Status:UA due to pain 8/31/17  3.   Pt will improve FOTO score to 64 in 18 visits to show significant improvement in function for progress to performing usual work with little to no difficulty                      PN 57                         Current Status    PLAN  [x]  Upgrade activities as tolerated     [x]  Continue plan of care  []  Update interventions per flow sheet       []  Discharge due to:_  []  Other:_      Andrew Lundborg, PT 9/1/2017  11:52 AM

## 2017-09-05 ENCOUNTER — HOSPITAL ENCOUNTER (OUTPATIENT)
Dept: PHYSICAL THERAPY | Age: 67
Discharge: HOME | End: 2017-09-05
Payer: MEDICARE

## 2017-09-05 PROCEDURE — 97112 NEUROMUSCULAR REEDUCATION: CPT

## 2017-09-05 PROCEDURE — 97110 THERAPEUTIC EXERCISES: CPT

## 2017-09-05 PROCEDURE — 97140 MANUAL THERAPY 1/> REGIONS: CPT

## 2017-09-05 NOTE — PROGRESS NOTES
PT DAILY TREATMENT NOTE - [facility] 316    Patient Name: Shin Darden  Date:2017  : 1950  [x]  Patient  Verified  Payor: Maria Isabel Staff / Plan: [place] MEDICARE PART A & B / Product Type: Medicare /    In time:3:32  Out time:4:30  Total Treatment Time (min): 43  Total Timed Codes (min): 43  1:1 Treatment Time ( [address] only): 40   Visit #: 15 of 18    Treatment Area: Low back pain [M54.5]    SUBJECTIVE  Pain Level (0-10 scale):  2  Any medication changes, allergies to medications, adverse drug reactions, diagnosis change, or new procedure performed?: [x] No    [] Yes (see summary sheet for update)  Subjective functional status/changes:   [] No changes reported  Cortasone sh    OBJECTIVE  13 min Therapeutic Exercise:  [x] See flow sheet :   Rationale: increase ROM, increase strength and improve coordination to improve the patients ability to perform increased ADL  20 min Manual Therapy:  STM/DTM (L) Glute, Inf glide (R) sacral base, (B) Lx rot mobs, (L) LE LAD   Rationale: decrease pain, increase ROM, increase tissue extensibility and decrease trigger points to perform increased ADL  10 min Neuromuscular Re-education:  [x]  See flow sheet : Lx Ext in prone w/CP   Rationale: increase ROM, increase strength and Inhibit neural tension (L) buttocks  to improve the patients ability to perform increased ADL and inhibit neural tension into the (L) glute      With   [x] TE   [] TA   [] neuro   [] other: Patient Education: [x] Review HEP    [] Progressed/Changed HEP based on:   [] positioning   [] body mechanics   [] transfers   [] heat/ice application    [] other:      Other Objective/Functional Measures:   - Pain with Lx Manual therapy  - decreased pain after treatment     Pain Level (0-10 scale) post treatment: 0    ASSESSMENT/Changes in Function:      Patient will continue to benefit from skilled PT services to modify and progress therapeutic interventions, address functional mobility deficits, address ROM deficits, address strength deficits, analyze and address soft tissue restrictions, analyze and cue movement patterns and analyze and modify body mechanics/ergonomics to attain remaining goals.      []  See Plan of Care  []  See progress note/recertification  []  See Discharge Summary         Progress towards goals / Updated goals:   Updated Goals to be accomplished in 8 treatments:  From initial plan   1.  Pt will report 60% improvement or better with involvement to show a significant increase in function                       PN 30-40%                         Current Status: Decreased pain and discomfort after cortisone shot 9/5/17  2.  Pt will demonstrate the ability to stoop and lift 15# to allow her to tolerate light lifting with house work                        KI 10# 10X                         Current Status:UA due to pain 8/31/17  3.   Pt will improve FOTO score to 64 in 18 visits to show significant improvement in function for progress to performing usual work with little to no difficulty                      PN 57                         Current Status    PLAN  [x]  Upgrade activities as tolerated     [x]  Continue plan of care  []  Update interventions per flow sheet       []  Discharge due to:_  []  Other:_      Keenan Cruz, PT 9/5/2017  3:44 PM    Future Appointments  Date Time Provider Rosette Freemna   10/23/2017 9:45 AM Sonya Boykin  [facility]   12/15/2017 11:20 AM Dino Villa  [facility]

## 2017-09-12 ENCOUNTER — HOSPITAL ENCOUNTER (OUTPATIENT)
Dept: PHYSICAL THERAPY | Age: 67
Discharge: HOME | End: 2017-09-12
Payer: MEDICARE

## 2017-09-12 PROCEDURE — 97032 APPL MODALITY 1+ESTIM EA 15: CPT

## 2017-09-12 PROCEDURE — 97110 THERAPEUTIC EXERCISES: CPT

## 2017-09-12 PROCEDURE — 97140 MANUAL THERAPY 1/> REGIONS: CPT

## 2017-09-12 NOTE — PROGRESS NOTES
PT DAILY TREATMENT NOTE - [facility] 3-16    Patient Name: Suzi Newton  Date:2017  : 1950  [x]  Patient  Verified  Payor: [place] MEDICARE / Plan: [place] MEDICARE PART A & B / Product Type: Medicare /    In time:839  Out time:926  Total Treatment Time (min): 39  Total Timed Codes (min): 39  1:1 Treatment Time ( [address] only): 44  Visit #: 16 of 18    Treatment Area: Low back pain [M54.5]    SUBJECTIVE  Pain Level (0-10 scale): 3  Any medication changes, allergies to medications, adverse drug reactions, diagnosis change, or new procedure performed?: [x] No    [] Yes (see summary sheet for update)  Subjective functional status/changes:   [] No changes reported  Marianne  got that spot good the other day with the deep massage. I felt pretty bad yesterday but better today.     OBJECTIVE    Modality rationale: decrease inflammation, decrease pain and increase tissue extensibility to improve the patients ability to aid with increase tolerance to ADLS and activities   Min Type Additional Details    [] Estim:  []Unatt       []IFC  []Premod                        []Other:  []w/ice   []w/heat  Position:  Location:   8 [x] Estim: [x]Att    []TENS instruct  []NMES                    [x]Other: LTO []w/US   []w/ice   []w/heat  Position:prone  Location:L piriformis    []  Traction: [] Cervical       []Lumbar                       [] Prone          []Supine                       []Intermittent   []Continuous Lbs:  [] before manual  [] after manual    []  Ultrasound: []Continuous   [] Pulsed                           []1MHz   []3MHz Location:  W/cm2:    []  Iontophoresis with dexamethasone         Location: [] Take home patch   [] In clinic    [x]  Ice post see below    []  heat  []  Ice massage  []  Laser   []  Anodyne Position:  Location:    []  Laser with stim  []  Other: Position:  Location:    []  Vasopneumatic Device Pressure:       [] lo [] med [] hi   Temperature: [] lo [] med [] hi   [] Skin assessment post-treatment:  []intact []redness- no adverse reaction    []redness  adverse reaction:       min []Eval                  []Re-Eval       13 min Therapeutic Exercise:  [x] See flow sheet :   Rationale: increase ROM, increase strength and improve coordination to improve the patients ability to aid with increase tolerance to ADLS and activities     min Therapeutic Activity:  []  See flow sheet :   Rationale:   to improve the patients ability to      10 min Neuromuscular Re-education:  []  See flow sheet :prone with bed elevated positional distraction   Rationale: decrease pain  to improve the patients ability to tolerate ADLs and activities    8 min Manual Therapy:  STM DTM TPR L piriformis and superior glut   Rationale: decrease pain and tightness to aid with increase tolerance to ADLs and activities     min Gait Training:  ___ feet with ___ device on level surfaces with ___ level of assist   Rationale: With   [] TE   [] TA   [] neuro   [] other: Patient Education: [x] Review HEP    [] Progressed/Changed HEP based on:   [] positioning   [] body mechanics   [] transfers   [] heat/ice application    [] other:      Other Objective/Functional Measures:VC exercises and technique, VC piriformis ball at wall. Pain Level (0-10 scale) post treatment: <3    ASSESSMENT/Changes in Function: tolerated well with trigger point L piriformis    Patient will continue to benefit from skilled PT services to modify and progress therapeutic interventions, address functional mobility deficits, address ROM deficits, address strength deficits, analyze and address soft tissue restrictions, analyze and cue movement patterns, analyze and modify body mechanics/ergonomics, assess and modify postural abnormalities and instruct in home and community integration to attain remaining goals.      [x]  See Plan of Care  []  See progress note/recertification  []  See Discharge Summary         Progress towards goals / Updated goals:  Updated Goals to be accomplished in 8 treatments:  From initial plan   1.  Pt will report 60% improvement or better with involvement to show a significant increase in function                       PN 30-40%                         Current Status: Decreased pain and discomfort after cortisone shot 9/5/17  2.  Pt will demonstrate the ability to stoop and lift 15# to allow her to tolerate light lifting with house work                        RR 10# 10X                         Current Status:UA due to pain 8/31/17  3.   Pt will improve FOTO score to 64 in 18 visits to show significant improvement in function for progress to performing usual work with little to no difficulty                      PN 57                         Current Status    PLAN  [x]  Upgrade activities as tolerated     [x]  Continue plan of care  []  Update interventions per flow sheet       []  Discharge due to:_  []  Other:_      Sam Tripathi PT 9/12/2017  9:54 AM

## 2017-09-14 ENCOUNTER — HOSPITAL ENCOUNTER (OUTPATIENT)
Dept: PHYSICAL THERAPY | Age: 67
Discharge: HOME | End: 2017-09-14
Payer: MEDICARE

## 2017-09-14 PROCEDURE — 97032 APPL MODALITY 1+ESTIM EA 15: CPT

## 2017-09-14 PROCEDURE — 97140 MANUAL THERAPY 1/> REGIONS: CPT

## 2017-09-14 NOTE — PROGRESS NOTES
PT DAILY TREATMENT NOTE - [facility] 3-16    Patient Name: Miroslava Beckford  Date:2017  : 1950  [x]  Patient  Verified  Payor: [place] MEDICARE / Plan: [place] MEDICARE PART A & B / Product Type: Medicare /    In time:841  Out time:933  Total Treatment Time (min): 44  Total Timed Codes (min): 34  1:1 Treatment Time ( only): 25  Visit #: 17 of 18    Treatment Area: Low back pain [M54.5]    SUBJECTIVE  Pain Level (0-10 scale): 0  Any medication changes, allergies to medications, adverse drug reactions, diagnosis change, or new procedure performed?: [x] No    [] Yes (see summary sheet for update)  Subjective functional status/changes:   [] No changes reported  It started in the middle now goes out to the L cheek, sometimes the R    OBJECTIVE    Modality rationale: decrease pain and increase tissue extensibility to improve the patients ability to aid with increase tolerance to ADLs and activities   Min Type Additional Details    [] Estim:  []Unatt       []IFC  []Premod                        []Other:  []w/ice   []w/heat  Position:  Location:   12 [x] Estim: [x]Att    []TENS instruct  []NMES                    [x]Other:LTO  []w/US   []w/ice   []w/heat  Position:  Location:    []  Traction: [] Cervical       []Lumbar                       [] Prone          []Supine                       []Intermittent   []Continuous Lbs:  [] before manual  [] after manual    []  Ultrasound: []Continuous   [] Pulsed                           []1MHz   []3MHz Location:  W/cm2:    []  Iontophoresis with dexamethasone         Location: [] Take home patch   [] In clinic   10 [x]  Ice     []  heat  []  Ice massage  []  Laser   []  Anodyne Position:  Location:    []  Laser with stim  []  Other: Position:  Location:    []  Vasopneumatic Device Pressure:       [] lo [] med [] hi   Temperature: [] lo [] med [] hi   [] Skin assessment post-treatment:  []intact []redness- no adverse reaction    []redness  adverse reaction:       min []Eval []Re-Eval       12 min Therapeutic Exercise:  [x] See flow sheet :   Rationale: increase ROM, increase strength and improve coordination to improve the patients ability to aid with increase tolerance to ADLS and activities     min Therapeutic Activity:  []  See flow sheet :   Rationale:   to improve the patients ability to       min Neuromuscular Re-education:  []  See flow sheet :   Rationale:   to improve the patients ability to     10 min Manual Therapy:  STM DTM TPR L LS/SIJ/piriformis/superior glut   Rationale: decrease pain, increase ROM, increase tissue extensibility and decrease trigger points to aid with increase tolerance to ADLs and activities     min Gait Training:  ___ feet with ___ device on level surfaces with ___ level of assist   Rationale: With   [] TE   [] TA   [] neuro   [] other: Patient Education: [x] Review HEP    [] Progressed/Changed HEP based on:   [] positioning   [] body mechanics   [] transfers   [] heat/ice application    [] other:      Other Objective/Functional Measures: VC exercises and technique    Pain Level (0-10 scale) post treatment: 0    ASSESSMENT/Changes in Function: tolerated well. Residual L Superior glut and piriformis STC and TTP/Trigger point    Patient will continue to benefit from skilled PT services to modify and progress therapeutic interventions, address functional mobility deficits, address ROM deficits, address strength deficits, analyze and address soft tissue restrictions, analyze and cue movement patterns, analyze and modify body mechanics/ergonomics, assess and modify postural abnormalities and instruct in home and community integration to attain remaining goals.      [x]  See Plan of Care  [x]  See progress note/recertification  []  See Discharge Summary         Progress towards goals / Updated goals:   1.  Pt will report 60% improvement or better with involvement to show a significant increase in function                       PN 30-40%                         Current Status: Decreased pain and discomfort after cortisone shot 9/5/17  2.  Pt will demonstrate the ability to stoop and lift 15# to allow her to tolerate light lifting with house work                        BU 10# 10X                         Current Status:UA due to pain 8/31/17  3.   Pt will improve FOTO score to 64 in 18 visits to show significant improvement in function for progress to performing usual work with little to no difficulty                      PN 57                         Current Status : recheck next session    PLAN  [x]  Upgrade activities as tolerated     [x]  Continue plan of care  []  Update interventions per flow sheet       []  Discharge due to:_  [x]  Other:_ recheck for [employer], PT 9/14/2017  8:48 AM

## 2017-09-15 ENCOUNTER — HOSPITAL ENCOUNTER (OUTPATIENT)
Dept: PHYSICAL THERAPY | Age: 67
Discharge: HOME | End: 2017-09-15
Payer: MEDICARE

## 2017-09-15 PROCEDURE — G8979 MOBILITY GOAL STATUS: HCPCS

## 2017-09-15 PROCEDURE — 97032 APPL MODALITY 1+ESTIM EA 15: CPT

## 2017-09-15 PROCEDURE — 97110 THERAPEUTIC EXERCISES: CPT

## 2017-09-15 PROCEDURE — G8980 MOBILITY D/C STATUS: HCPCS

## 2017-09-15 NOTE — PROGRESS NOTES
PT DISCHARGE DAILY NOTE AND [id number]    Date:9/15/2017  Patient name: Honorio Gutiérrez Start of Care: 17   Referral source: Dottie Lares MD : [id number]   Medical/Treatment Diagnosis: Low back pain [M54.5] Onset Date:2017     Prior Hospitalization: see medical history Provider#: [id number]   Medications: Verified on Patient Summary List    Comorbidities: HTN, Thyroid problems  Prior Level of Function:Able to dance with friends, do house work, lift a case of water and able to do gardening    Visits from Start of Care: 18   Missed Visits: 4    Reporting Period :17 to 9/15/17    [x]  Patient  Verified  Payor: [place] MEDICARE / Plan: [place] MEDICARE PART A & B / Product Type: Medicare /    In time:739  Out time:828  Total Treatment Time (min): 40  Total Timed Codes (min):30  1:1 Treatment Time ( [address] only): 30  Visit #:18 of 18    SUBJECTIVE  Pain Level (0-10 scale): 0  Any medication changes, allergies to medications, adverse drug reactions, diagnosis change, or new procedure performed?: [x] No    [] Yes (see summary sheet for update)  Subjective functional status/changes:   [] No changes reported  I have an appointment to see him on Tuesday    OBJECTIVE    Modality rationale: decrease inflammation and increase tissue extensibility to improve the patients ability to aid with increase tolerance to ADLs and activities   Min Type Additional Details    [] Estim:  []Unatt       []IFC  []Premod                        []Other:  []w/ice   []w/heat  Position:  Location:   10 [x] Estim: [x]Att    []TENS instruct  []NMES                    [x]Other:LTO  []w/US   []w/ice   []w/heat  Position:prone  Location:L Piriformis    []  Traction: [] Cervical       []Lumbar                       [] Prone          []Supine                       []Intermittent   []Continuous Lbs:  [] before manual  [] after manual    []  Ultrasound: []Continuous   [] Pulsed                           []1MHz   []3MHz W/cm2:  Location:    [] Iontophoresis with dexamethasone         Location: [] Take home patch   [] In clinic   10 [x]  Ice post    []  heat  []  Ice massage  []  Laser   []  Anodyne Position:  Location:    []  Laser with stim  []  Other:  Position:  Location:    []  Vasopneumatic Device Pressure:       [] lo [] med [] hi   Temperature: [] lo [] med [] hi   [] Skin assessment post-treatment:  []intact []redness- no adverse reaction    []redness  adverse reaction:       min []Eval                  []Re-Eval       10 min Therapeutic Exercise:  [] See flow sheet :   Rationale: increase ROM, increase strength and improve coordination to improve the patients ability to aid with increase tolerance to ADLs and activities     min Therapeutic Activity:  []  See flow sheet :   Rationale:   to improve the patients ability to       min Neuromuscular Re-education:  []  See flow sheet :   Rationale:   to improve the patients ability to     10 min Manual Therapy:  STM DTM TPR L LS and Piriformis   Rationale: decrease pain, increase ROM, increase tissue extensibility and decrease trigger points to aid with increase tolerance to ADLS     min Gait Training:  ___ feet with ___ device on level surfaces with ___ level of assist   Rationale:           With   [] TE   [] TA   [] neuro   [] other: Patient Education: [x] Review HEP    [] Progressed/Changed HEP based on:   [] positioning   [] body mechanics   [] transfers   [] heat/ice application    [] other:      Other Objective/Functional Measures: VC exercises   FOTO 65      DC INSTRUCTIONS     Pain Level (0-10 scale) post treatment: 0    Summary of Care:  Goal:Pt will be compliant and independent with HEP in order to facilitate PT sessions and aid with self management   Status at last note/certification:eval  Status at discharge: met    Goal: Pt to tolerate 30 min or more of TE and/or Interventions w/o increased s/s  Status at last note/certification:eval  Status at discharge: met    Goal: Pt will improve FOTO score to 59 in 12 visits to show significant improvement in function for progress to performing usual work with little to no difficulty  Status at last note/certification:eval  Status at discharge: met    Goal:Pt will demonstrate the ability to stoop and lift 25# to allow her to tolerate light lifting with house work  Status at last note/certification:eval  Status at discharge: not met    ASSESSMENT/Changes in Function: tolerated well. She is ready to attempt self management with HEP and will see MD next Tuesday. G-Codes (GP)  Mobility   [id number] Goal  CK= 40-59%  D/C  CJ= 20-39%       The severity rating is based on clinical judgment and the FOTO score.       Thank you for this referral!     PLAN  [x]Discontinue therapy: [x]Patient has reached or is progressing toward set goals/ patient to attempt self management      []Patient is non-compliant or has abdicated      []Due to lack of appreciable progress towards set goals    Brittany Guaman, PT 9/15/2017  7:40 AM

## 2018-01-31 ENCOUNTER — OFFICE VISIT (OUTPATIENT)
Dept: CARDIOLOGY CLINIC | Age: 68
End: 2018-01-31

## 2018-01-31 VITALS
DIASTOLIC BLOOD PRESSURE: 80 MMHG | HEIGHT: 62 IN | SYSTOLIC BLOOD PRESSURE: 124 MMHG | BODY MASS INDEX: 29.81 KG/M2 | WEIGHT: 162 LBS | OXYGEN SATURATION: 97 %

## 2018-01-31 DIAGNOSIS — E78.5 DYSLIPIDEMIA: ICD-10-CM

## 2018-01-31 DIAGNOSIS — I10 ESSENTIAL HYPERTENSION: Primary | ICD-10-CM

## 2018-01-31 NOTE — MR AVS SNAPSHOT
[address] [address] [address] [address] [phone] Patient: Melissa Mercado MRN: [id number] HNP:[id number] Visit Information Date & Time Provider Department Dept. Phone Encounter #  
 1/31/2018 11:40 AM General Sarah MD [facility] [id number] Follow-up Instructions Return in about 1 year (around 1/31/2019). 10/22/2018  9:45 AM  
Any with Edith Lundberg MD  
[facility] Appt Note: 1 year fu  
 [address] 
[address] [facility] [address]  
[address] [facility] [address] [facility] [address] Upcoming Health Maintenance Date Due Hepatitis C Screening 1950 DTaP/Tdap/Td series (1 - Tdap) 3/7/1972 FOBT Q 1 YEAR AGE 50-75 3/7/2001 ZOSTER VACCINE AGE 60> 1/7/2011 GLAUCOMA SCREENING Q2Y 3/7/2016 Pneumococcal 65+ Low/Medium Risk (1 of 2 - PCV13) 3/7/2016 MEDICARE YEARLY EXAM 3/7/2016 Influenza Age 5 to Adult 8/1/2017 BREAST CANCER SCRN MAMMOGRAM 6/1/2019 Allergies as of 1/31/2018  Review Complete On: 10/23/2017 By: Celeste Mccracken Severity Noted Reaction Type Reactions Septra [Sulfamethoprim]  10/23/2017    Other (comments) Upset stomach, makes patient real sick Current Immunizations  Never Reviewed No immunizations on file. Not reviewed this visit You Were Diagnosed With   
  
 Codes Comments Cardiomyopathy, unspecified type ([facility]    -  Primary ICD-10-CM: I42.9 ICD-9-CM: 425.4 Essential hypertension     ICD-10-CM: I10 
ICD-9-CM: 401.9 Dyslipidemia     ICD-10-CM: E78.5 ICD-9-CM: 272.4 Vitals BP Height(growth percentile) Weight(growth percentile) SpO2 BMI Smoking Status 124/80 5' 2\" (1.575 m) 162 lb (73.5 kg) 97% 29.63 kg/m2 Former Smoker Vitals History BMI and BSA Data Body Mass Index Body Surface Area 29.63 kg/m 2 1.79 m 2 Preferred Pharmacy Pharmacy Name Phone [address] [facility] [address], [facility]. [facility] [address] [phone] Your Updated Medication List  
  
   
This list is accurate as of: 1/31/18 12:04 PM.  Always use your most recent med list.  
  
  
  
  
 cetirizine 10 mg tablet Commonly known as:  ZYRTEC  
  
 CIPRODEX 0.3-0.1 % otic suspension Generic drug:  ciprofloxacin-dexamethasone  
  
 cyclobenzaprine 10 mg tablet Commonly known as:  FLEXERIL  
  
 ergocalciferol 50,000 unit capsule Commonly known as:  ERGOCALCIFEROL Take 50,000 Units by mouth every seven (7) days. esomeprazole 40 mg capsule Commonly known as:  Jodi Rash Take 40 mg by mouth daily. levothyroxine 112 mcg tablet Commonly known as:  SYNTHROID Take  by mouth. LIVALO 2 mg tablet Generic drug:  pitavastatin calcium Take  by mouth daily. meloxicam 7.5 mg tablet Commonly known as:  MOBIC  
  
 mirabegron ER 25 mg ER tablet Commonly known as:  MYRBETRIQ Take 1 Tab by mouth daily. Indications: INCREASED URINARY FREQUENCY, URINARY URGE INCONTINENCE, URINARY URGENCY  
  
 omega-3 fatty acids-vitamin e 1,000 mg Cap Take 1 Cap by mouth two (2) times a day. oxybutynin 5 mg tablet Commonly known as:  EMWRWRMK Take  by mouth. PROTONIX 40 mg tablet Generic drug:  pantoprazole Take  by mouth. REFRESH P.M. 57.3-42.5 % Oint ophthalmic ointment Generic drug:  White Petrolatum-Mineral Oil REFRESH TEARS 0.5 % Drop ophthalmic solution Generic drug:  carboxymethylcellulose sodium  
  
 telmisartan-hydroCHLOROthiazide 40-12.5 mg per tablet Commonly known as:  MICARDIS HCT Take 1 Tab by mouth daily. We Performed the Following AMB POC EKG ROUTINE W/ 12 LEADS, INTER & REP [03048 CPT(R)] Follow-up Instructions Return in about 1 year (around 1/31/2019). Please provide this summary of care documentation to your next provider. Your primary care clinician is listed as Kev Johansen. If you have any questions after today's visit, please call [phone].

## 2018-01-31 NOTE — PROGRESS NOTES
HISTORY OF PRESENT ILLNESS  Latha Price is a 78 y.o. female. Hypertension   Pertinent negatives include no chest pain, no abdominal pain, no headaches and no shortness of breath. Patient presents for a follow-up office visit. She has a past medical history significant for hypertension, dyslipidemia, and hypothyroidism. She reports a family history for coronary artery disease. She was also a smoker up until recently. The patient underwent an echocardiogram in 2016 via her PCP which showed a mildly depressed LV EF of 48%, mild concentric LVH, with grade 1 diastolic dysfunction. Patient then underwent an exercise nuclear stress test in June 2016 at our office which was a normal study. Patient exercised for 8 minutes 45 seconds without EKG changes or symptoms. Her ejection fraction was normal.  There is no evidence of ischemia. She was last seen in the office a little over a year ago. Since last visit, she has been feeling well. No major change in her activity tolerance. No new chest pain, shortness of breath, orthopnea, PND or leg slowing. No claudication. Past Medical History:   Diagnosis Date    Acquired ventricular hypertrophy     Allergic rhinitis     Bladder incontinence     Cardiac echocardiogram 05/11/2016    On-Sight Ultrasound:  EF 48%. No RWMA on segments seen. Mild conc LVH. Gr 1 DDfx. Normal RVSP. Mild TR.  Cardiac nuclear imaging test, low risk 06/15/2016    Low risk. No ischemia or prior infarction. No RWMA. EF 71%. Neg EKG on maximal EST. Ex time 8 min 45 sec.     Cardiomyopathy ([address]     Cardiovascular disease     Esophageal reflux     Essential hypertension     Hyperlipidemia     Numerous moles     Sleep apnea     Thyroid disease     Vitamin D deficiency       Current Outpatient Prescriptions   Medication Sig Dispense Refill    cetirizine (ZYRTEC) 10 mg tablet       CIPRODEX 0.3-0.1 % otic suspension       pitavastatin calcium (LIVALO) 2 mg tablet Take  by mouth daily.  oxybutynin (DITROPAN) 5 mg tablet Take  by mouth.  pantoprazole (PROTONIX) 40 mg tablet Take  by mouth.  REFRESH TEARS 0.5 % drop ophthalmic solution       cyclobenzaprine (FLEXERIL) 10 mg tablet       meloxicam (MOBIC) 7.5 mg tablet       REFRESH P.M. 57.3-42.5 % oint ophthalmic ointment       mirabegron ER (MYRBETRIQ) 25 mg ER tablet Take 1 Tab by mouth daily. Indications: INCREASED URINARY FREQUENCY, URINARY URGE INCONTINENCE, URINARY URGENCY 90 Tab 3    levothyroxine (SYNTHROID) 112 mcg tablet Take  by mouth.  omega-3 fatty acids-vitamin e 1,000 mg cap Take 1 Cap by mouth two (2) times a day.  esomeprazole (NEXIUM) 40 mg capsule Take 40 mg by mouth daily.  telmisartan-hydrochlorothiazide (MICARDIS HCT) 40-12.5 mg per tablet Take 1 Tab by mouth daily.  ergocalciferol (ERGOCALCIFEROL) 50,000 unit capsule Take 50,000 Units by mouth every seven (7) days. Allergies   Allergen Reactions    Septra [Sulfamethoprim] Other (comments)     Upset stomach, makes patient real sick        Social History   Substance Use Topics    Smoking status: Former Smoker    Smokeless tobacco: Never Used    Alcohol use No            Review of Systems   Constitutional: Negative for chills, fever and weight loss. HENT: Negative for nosebleeds. Eyes: Negative for blurred vision and double vision. Respiratory: Negative for cough, shortness of breath and wheezing. Cardiovascular: Negative for chest pain, palpitations, orthopnea, claudication, leg swelling and PND. Gastrointestinal: Negative for abdominal pain, heartburn, nausea and vomiting. Genitourinary: Negative for dysuria and hematuria. Musculoskeletal: Negative for falls and myalgias. Skin: Negative for rash. Neurological: Negative for dizziness, focal weakness and headaches. Endo/Heme/Allergies: Does not bruise/bleed easily. Psychiatric/Behavioral: Negative for substance abuse. Visit Vitals    /80    Ht 5' 2\" (1.575 m)    Wt 73.5 kg (162 lb)    SpO2 97%    BMI 29.63 kg/m2      Physical Exam   Constitutional: She is oriented to person, place, and time. She appears well-developed and well-nourished. HENT:   Head: Normocephalic and atraumatic. Eyes: Conjunctivae are normal.   Neck: Neck supple. No JVD present. Carotid bruit is not present. Cardiovascular: Normal rate, regular rhythm, S1 normal, S2 normal and normal pulses. Exam reveals no gallop. No murmur heard. Pulmonary/Chest: Effort normal and breath sounds normal. She has no wheezes. She has no rales. Abdominal: Soft. Bowel sounds are normal. There is no tenderness. Musculoskeletal: She exhibits no edema or tenderness. Neurological: She is alert and oriented to person, place, and time. Skin: Skin is warm and dry. Psychiatric: She has a normal mood and affect. Her behavior is normal. Thought content normal.     EKG: Normal sinus rhythm, normal axis, normal QTc interval, borderline low voltage, no ST or T wave abnormalities concerning for ischemia. Compared to the previous EKG, no significant interval change. ASSESSMENT and PLAN    Hypertension. This has been well controlled on her current regimen of an ARB/thiazide diuretic,  Which I would continue. Dyslipidemia. Patient has been managed with simvastatin 40 mg daily for this followed by her PCP. Her LDL should be as low as possible given her family history of premature CAD. History of tobacco abuse. Patient quit smoking in 2016. She is congratulated for this and reminded to remain tobacco free. Patient can follow up annually, sooner if needed.

## 2018-01-31 NOTE — PROGRESS NOTES
1. Have you been to the ER, urgent care clinic since your last visit? Hospitalized since your last visit? No     2. Have you seen or consulted any other health care providers outside of the [address] [place] [address] since your last visit? Include any pap smears or colon screening.  No no chest pain, no cough, and no shortness of breath.

## 2018-05-30 ENCOUNTER — HOSPITAL ENCOUNTER (OUTPATIENT)
Dept: GENERAL RADIOLOGY | Age: 68
Discharge: HOME | End: 2018-05-30
Payer: MEDICARE

## 2018-05-30 DIAGNOSIS — J06.9 UPPER RESPIRATORY INFECTION, ACUTE: ICD-10-CM

## 2018-05-30 PROCEDURE — 71046 X-RAY EXAM CHEST 2 VIEWS: CPT

## 2018-06-14 ENCOUNTER — HOSPITAL ENCOUNTER (OUTPATIENT)
Dept: MAMMOGRAPHY | Age: 68
Discharge: HOME | End: 2018-06-14
Payer: MEDICARE

## 2018-06-14 DIAGNOSIS — Z12.31 ENCOUNTER FOR SCREENING MAMMOGRAM FOR BREAST CANCER: ICD-10-CM

## 2018-06-14 PROCEDURE — 77063 BREAST TOMOSYNTHESIS BI: CPT

## 2018-06-22 ENCOUNTER — HOSPITAL ENCOUNTER (OUTPATIENT)
Dept: GENERAL RADIOLOGY | Age: 68
Discharge: HOME | End: 2018-06-22
Payer: MEDICARE

## 2018-06-22 DIAGNOSIS — E03.9 ADULT HYPOTHYROIDISM: ICD-10-CM

## 2018-06-22 PROCEDURE — 71046 X-RAY EXAM CHEST 2 VIEWS: CPT

## 2018-07-09 ENCOUNTER — HOSPITAL ENCOUNTER (OUTPATIENT)
Dept: GENERAL RADIOLOGY | Age: 68
Discharge: HOME | End: 2018-07-09
Payer: MEDICARE

## 2018-07-09 DIAGNOSIS — M54.32 SCIATICA, LEFT SIDE: ICD-10-CM

## 2018-07-09 PROCEDURE — 73502 X-RAY EXAM HIP UNI 2-3 VIEWS: CPT

## 2018-07-13 ENCOUNTER — HOSPITAL ENCOUNTER (OUTPATIENT)
Dept: BONE DENSITY | Age: 68
Discharge: HOME | End: 2018-07-13
Attending: FAMILY MEDICINE
Payer: MEDICARE

## 2018-07-13 DIAGNOSIS — Z78.0 ASYMPTOMATIC POSTMENOPAUSAL STATE: ICD-10-CM

## 2018-07-13 DIAGNOSIS — Z13.820 SCREENING FOR OSTEOPOROSIS: ICD-10-CM

## 2018-07-13 DIAGNOSIS — M85.80 OSTEOPENIA: ICD-10-CM

## 2018-07-13 PROCEDURE — 77080 DXA BONE DENSITY AXIAL: CPT

## 2018-09-07 ENCOUNTER — HOSPITAL ENCOUNTER (OUTPATIENT)
Dept: NON INVASIVE DIAGNOSTICS | Age: 68
Discharge: HOME | End: 2018-09-07
Attending: FAMILY MEDICINE
Payer: MEDICARE

## 2018-09-07 VITALS
SYSTOLIC BLOOD PRESSURE: 124 MMHG | BODY MASS INDEX: 29.81 KG/M2 | WEIGHT: 162 LBS | HEIGHT: 62 IN | DIASTOLIC BLOOD PRESSURE: 80 MMHG

## 2018-09-07 DIAGNOSIS — I95.1 ORTHOSTATIC HYPOTENSION: ICD-10-CM

## 2018-09-07 DIAGNOSIS — I25.10 CVD (CARDIOVASCULAR DISEASE): ICD-10-CM

## 2018-09-07 DIAGNOSIS — R42 DIZZINESS: ICD-10-CM

## 2018-09-07 LAB
ECHO LA VOL 2C: 29 ML (ref 22–52)
ECHO LA VOL 4C: 35 ML (ref 22–52)
ECHO LA VOL BP: 33 ML (ref 22–52)
ECHO LA VOL/BSA BIPLANE: 18.88 ML/M2
ECHO LA VOLUME INDEX A2C: 16.59 ML/M2
ECHO LA VOLUME INDEX A4C: 20.03 ML/M2
ECHO LV E' LATERAL VELOCITY: 8.29 CM/S
ECHO LV E' SEPTAL VELOCITY: 6.04 CM/S
ECHO LV INTERNAL DIMENSION DIASTOLIC: 4.1 CM (ref 3.9–5.3)
ECHO LV INTERNAL DIMENSION SYSTOLIC: 2.6 CM
ECHO LV IVSD: 1.1 CM (ref 0.6–0.9)
ECHO LV MASS 2D: 150.8 G (ref 67–162)
ECHO LV MASS INDEX 2D: 86.3 G/M2
ECHO LV POSTERIOR WALL DIASTOLIC: 0.9 CM (ref 0.6–0.9)
ECHO LVOT DIAM: 1.9 CM
ECHO LVOT PEAK GRADIENT: 3.3 MMHG
ECHO LVOT PEAK VELOCITY: 90.2 CM/S
ECHO LVOT VTI: 19.1 CM
ECHO MV A VELOCITY: 102 CM/S
ECHO MV E DECELERATION TIME (DT): 211 MS
ECHO MV E VELOCITY: 0.71 CM/S
ECHO MV E/A RATIO: 0.01
ECHO MV E/E' LATERAL: 0.09
ECHO MV E/E' RATIO (AVERAGED): 0.1
ECHO MV E/E' SEPTAL: 0.12
ECHO TV REGURGITANT MAX VELOCITY: 229 CM/S
ECHO TV REGURGITANT PEAK GRADIENT: 21 MMHG

## 2018-09-07 PROCEDURE — 93306 TTE W/DOPPLER COMPLETE: CPT

## 2019-01-05 ENCOUNTER — HOSPITAL ENCOUNTER (OUTPATIENT)
Dept: GENERAL RADIOLOGY | Age: 69
Discharge: HOME | End: 2019-01-05
Payer: MEDICARE

## 2019-01-05 DIAGNOSIS — J06.9 UPPER RESPIRATORY INFECTION: ICD-10-CM

## 2019-01-05 PROCEDURE — 71046 X-RAY EXAM CHEST 2 VIEWS: CPT

## 2019-01-23 ENCOUNTER — HOSPITAL ENCOUNTER (OUTPATIENT)
Age: 69
Discharge: HOME | End: 2019-01-23
Attending: FAMILY MEDICINE
Payer: MEDICARE

## 2019-01-23 DIAGNOSIS — M54.50 LOW BACK PAIN: ICD-10-CM

## 2019-01-23 PROCEDURE — 72148 MRI LUMBAR SPINE W/O DYE: CPT

## 2019-02-13 ENCOUNTER — OFFICE VISIT (OUTPATIENT)
Dept: CARDIOLOGY CLINIC | Age: 69
End: 2019-02-13

## 2019-02-13 VITALS
OXYGEN SATURATION: 97 % | HEIGHT: 62 IN | SYSTOLIC BLOOD PRESSURE: 128 MMHG | DIASTOLIC BLOOD PRESSURE: 72 MMHG | WEIGHT: 168 LBS | HEART RATE: 73 BPM | BODY MASS INDEX: 30.91 KG/M2

## 2019-02-13 DIAGNOSIS — Z82.49 FAMILY HISTORY OF EARLY CAD: ICD-10-CM

## 2019-02-13 DIAGNOSIS — I10 ESSENTIAL HYPERTENSION: Primary | ICD-10-CM

## 2019-02-13 DIAGNOSIS — E78.5 DYSLIPIDEMIA: ICD-10-CM

## 2019-02-13 NOTE — PROGRESS NOTES
Cy Ou presents today for Chief Complaint Patient presents with  Hypertension 1 year follow up Cy Ou preferred language for health care discussion is english/other. Is someone accompanying this pt? no 
 
Is the patient using any DME equipment during OV? no 
 
Depression Screening: No flowsheet data found. Learning Assessment: 
Learning Assessment 1/31/2018 PRIMARY LEARNER Patient PRIMARY LANGUAGE ENGLISH  
LEARNER PREFERENCE PRIMARY DEMONSTRATION  
ANSWERED BY Patient RELATIONSHIP SELF Abuse Screening: No flowsheet data found. Fall Risk No flowsheet data found. Pt currently taking Anticoagulant therapy? no 
 
Coordination of Care: 1. Have you been to the ER, urgent care clinic since your last visit? Hospitalized since your last visit? no 
 
2. Have you seen or consulted any other health care providers outside of the [address] [place] [address] since your last visit? Include any pap smears or colon screening.  no

## 2019-02-13 NOTE — PROGRESS NOTES
HISTORY OF PRESENT ILLNESS Aleah Vargas is a 80 y.o. female. Hypertension Pertinent negatives include no chest pain, no abdominal pain, no headaches and no shortness of breath. Patient presents for a follow-up office visit. She has a past medical history significant for hypertension, dyslipidemia, and hypothyroidism. She reports a family history for coronary artery disease. She was also a smoker up until 2016. Patient underwent an exercise nuclear stress test in June 2016 at our office which was a normal study. Patient exercised for 8 minutes 45 seconds without EKG changes or symptoms. Her ejection fraction was normal.  There is no evidence of ischemia. More recently, she underwent an echocardiogram in September 2018 which was essentially normal.  EF 60%, no valvular heart disease with exception of mild aortic valve calcification without stenosis. She was last seen in the office one year ago. Since last visit she has been feeling well. She is now exercising regularly either on the treadmill or the exercise bike for 20-30 minutes each time at least 3 days a week. She denies any exertional chest pain or shortness of breath. She feels her activity level is better this year than it was a year ago. Past Medical History:  
Diagnosis Date  Acquired ventricular hypertrophy  Allergic rhinitis  Bladder incontinence  Cardiac echocardiogram 05/11/2016 On-Sight Ultrasound:  EF 48%. No RWMA on segments seen. Mild conc LVH. Gr 1 DDfx. Normal RVSP. Mild TR.  Cardiac nuclear imaging test, low risk 06/15/2016 Low risk. No ischemia or prior infarction. No RWMA. EF 71%. Neg EKG on maximal EST. Ex time 8 min 45 sec.  Cardiomyopathy ([address]  Cardiovascular disease  Esophageal reflux  Essential hypertension  Hyperlipidemia  Numerous moles  Sleep apnea  Thyroid disease  Vitamin D deficiency Current Outpatient Medications Medication Sig Dispense Refill  mirabegron ER (MYRBETRIQ) 25 mg ER tablet Take 1 Tab by mouth daily. Indications: INCREASED URINARY FREQUENCY, URINARY URGE INCONTINENCE, URINARY URGENCY 90 Tab 3  cetirizine (ZYRTEC) 10 mg tablet  pitavastatin calcium (LIVALO) 2 mg tablet Take  by mouth daily.  oxybutynin (DITROPAN) 5 mg tablet Take  by mouth.  pantoprazole (PROTONIX) 40 mg tablet Take  by mouth.  REFRESH TEARS 0.5 % drop ophthalmic solution  meloxicam (MOBIC) 7.5 mg tablet  REFRESH P.M. 57.3-42.5 % oint ophthalmic ointment  levothyroxine (SYNTHROID) 112 mcg tablet Take  by mouth.  omega-3 fatty acids-vitamin e 1,000 mg cap Take 1 Cap by mouth two (2) times a day.  telmisartan-hydrochlorothiazide (MICARDIS HCT) 40-12.5 mg per tablet Take 1 Tab by mouth daily.  ergocalciferol (ERGOCALCIFEROL) 50,000 unit capsule Take 50,000 Units by mouth every seven (7) days. Allergies Allergen Reactions  Septra [Sulfamethoprim] Other (comments) Upset stomach, makes patient real sick Social History Tobacco Use  Smoking status: Former Smoker  Smokeless tobacco: Never Used Substance Use Topics  Alcohol use: No  
 Drug use: No  
   
  
 
Review of Systems Constitutional: Negative for chills, fever and weight loss. HENT: Negative for nosebleeds. Eyes: Negative for blurred vision and double vision. Respiratory: Negative for cough, shortness of breath and wheezing. Cardiovascular: Negative for chest pain, palpitations, orthopnea, claudication, leg swelling and PND. Gastrointestinal: Negative for abdominal pain, heartburn, nausea and vomiting. Genitourinary: Negative for dysuria and hematuria. Musculoskeletal: Negative for falls and myalgias. Skin: Negative for rash. Neurological: Negative for dizziness, focal weakness and headaches. Endo/Heme/Allergies: Does not bruise/bleed easily. Psychiatric/Behavioral: Negative for substance abuse. Visit Vitals /72 Pulse 73 Ht 5' 2\" (1.575 m) Wt 76.2 kg (168 lb) SpO2 97% BMI 30.73 kg/m² Physical Exam  
Constitutional: She is oriented to person, place, and time. She appears well-developed and well-nourished. HENT:  
Head: Normocephalic and atraumatic. Eyes: Conjunctivae are normal.  
Neck: Neck supple. No JVD present. Carotid bruit is not present. Cardiovascular: Normal rate, regular rhythm, S1 normal, S2 normal and normal pulses. Exam reveals no gallop. No murmur heard. Pulmonary/Chest: Effort normal and breath sounds normal. She has no wheezes. She has no rales. Abdominal: Soft. Bowel sounds are normal. There is no tenderness. Musculoskeletal: She exhibits no edema or tenderness. Neurological: She is alert and oriented to person, place, and time. Skin: Skin is warm and dry. Psychiatric: She has a normal mood and affect. Her behavior is normal. Thought content normal.  
 
EKG: Normal sinus rhythm, normal axis, normal QTc interval, borderline low voltage, no ST or T wave abnormalities concerning for ischemia. Compared to the previous EKG, no significant interval change. ASSESSMENT and PLAN Hypertension. This has been historically Ell controlled on her current regimen of an ARB/thiazide diuretic,  Which I would continue. Dyslipidemia. Patient has been managed with simvastatin 40 mg daily for this followed by her PCP. Her LDL should be as low as possible given her family history of premature CAD. History of tobacco abuse. Patient quit smoking in 2016. She is congratulated for this and reminded to remain tobacco free. Family history for CAD. Because of this I recommended that she exercise regularly and continue to modify all of her risk factors. Patient can follow up annually, sooner if needed.

## 2019-06-19 ENCOUNTER — HOSPITAL ENCOUNTER (OUTPATIENT)
Dept: MAMMOGRAPHY | Age: 69
Discharge: HOME | End: 2019-06-19
Attending: FAMILY MEDICINE
Payer: MEDICARE

## 2019-06-19 DIAGNOSIS — Z12.31 VISIT FOR SCREENING MAMMOGRAM: ICD-10-CM

## 2019-06-19 PROCEDURE — 77063 BREAST TOMOSYNTHESIS BI: CPT

## 2019-10-11 ENCOUNTER — HOSPITAL ENCOUNTER (OUTPATIENT)
Dept: GENERAL RADIOLOGY | Age: 69
Discharge: HOME | End: 2019-10-11
Payer: MEDICARE

## 2019-10-11 DIAGNOSIS — M79.671 RIGHT FOOT PAIN: ICD-10-CM

## 2019-10-11 DIAGNOSIS — M79.674 GREAT TOE PAIN, RIGHT: ICD-10-CM

## 2019-10-11 PROCEDURE — 73620 X-RAY EXAM OF FOOT: CPT

## 2019-10-11 PROCEDURE — 73660 X-RAY EXAM OF TOE(S): CPT

## 2019-10-29 ENCOUNTER — HOSPITAL ENCOUNTER (OUTPATIENT)
Dept: CT IMAGING | Age: 69
Discharge: HOME | End: 2019-10-29
Attending: FAMILY MEDICINE
Payer: MEDICARE

## 2019-10-29 DIAGNOSIS — J36 PERITONSILLAR ABSCESS: ICD-10-CM

## 2019-10-29 LAB — CREAT UR-MCNC: 0.7 MG/DL (ref 0.6–1.3)

## 2019-10-29 PROCEDURE — 82565 ASSAY OF CREATININE: CPT

## 2019-10-29 PROCEDURE — 70491 CT SOFT TISSUE NECK W/DYE: CPT

## 2019-10-29 PROCEDURE — 74011636320 HC RX REV CODE- 636/320: Performed by: FAMILY MEDICINE

## 2019-10-29 RX ADMIN — IOPAMIDOL 75 ML: 612 INJECTION, SOLUTION INTRAVENOUS at 18:28

## 2020-01-27 ENCOUNTER — OFFICE VISIT (OUTPATIENT)
Dept: ORTHOPEDIC SURGERY | Age: 70
End: 2020-01-27

## 2020-01-27 VITALS
WEIGHT: 165.2 LBS | HEART RATE: 98 BPM | OXYGEN SATURATION: 96 % | SYSTOLIC BLOOD PRESSURE: 126 MMHG | TEMPERATURE: 98.2 F | HEIGHT: 62 IN | DIASTOLIC BLOOD PRESSURE: 74 MMHG | BODY MASS INDEX: 30.4 KG/M2 | RESPIRATION RATE: 15 BRPM

## 2020-01-27 DIAGNOSIS — M79.641 RIGHT HAND PAIN: ICD-10-CM

## 2020-01-27 DIAGNOSIS — S63.694A OTHER SPRAIN OF RIGHT RING FINGER, INITIAL ENCOUNTER: Primary | ICD-10-CM

## 2020-01-27 DIAGNOSIS — S63.501S RIGHT WRIST SPRAIN, SEQUELA: ICD-10-CM

## 2020-01-27 NOTE — PROGRESS NOTES
Jaki Alvarenga is a 77 y.o. female right handed unknown employment. Worker's Compensation and legal considerations: not known. Vitals:    01/27/20 0949   BP: 126/74   Pulse: 98   Resp: 15   Temp: 98.2 °F (36.8 °C)   TempSrc: Oral   SpO2: 96%   Weight: 165 lb 3.2 oz (74.9 kg)   Height: 5' 2\" (1.575 m)   PainSc:   3   PainLoc: Hand           Chief Complaint   Patient presents with    Wrist Pain     right wrist pain    Hand Pain     right hand pain         HPI: Patient comes in today with complaints of right hand pain and wrist pain after a fall. She reports the pain is persisted in her wrist as well as some tenderness in her ring finger with range of motion. Date of onset:  12/2019    Injury: Yes: Comment: fall    Prior Treatment:  No    Numbness/ Tingling: No    ROS: Review of Systems - General ROS: negative  Respiratory ROS: no cough, shortness of breath, or wheezing  Cardiovascular ROS: no chest pain or dyspnea on exertion  Musculoskeletal ROS: positive for - pain in finger - right and wrist - right  Neurological ROS: negative  Dermatological ROS: negative    Past Medical History:   Diagnosis Date    Acquired ventricular hypertrophy     Allergic rhinitis     Bladder incontinence     Cardiac echocardiogram 05/11/2016    On-Sight Ultrasound:  EF 48%. No RWMA on segments seen. Mild conc LVH. Gr 1 DDfx. Normal RVSP. Mild TR.  Cardiac nuclear imaging test, low risk 06/15/2016    Low risk. No ischemia or prior infarction. No RWMA. EF 71%. Neg EKG on maximal EST. Ex time 8 min 45 sec.     Cardiomyopathy ([address]     Cardiovascular disease     Esophageal reflux     Essential hypertension     Hyperlipidemia     Numerous moles     Sleep apnea     Thyroid disease     Vitamin D deficiency        Past Surgical History:   Procedure Laterality Date    APPENDECTOMY      HX CATARACT REMOVAL      HX LEEP PROCEDURE         Current Outpatient Medications   Medication Sig Dispense Refill    telmisartan (MICARDIS) 40 mg tablet       mirabegron ER (MYRBETRIQ) 50 mg ER tablet Take 1 Tab by mouth daily. 90 Tab 3    cetirizine (ZYRTEC) 10 mg tablet       pitavastatin calcium (LIVALO) 2 mg tablet Take  by mouth daily.  pantoprazole (PROTONIX) 40 mg tablet Take  by mouth.  REFRESH TEARS 0.5 % drop ophthalmic solution       meloxicam (MOBIC) 7.5 mg tablet       REFRESH P.M. 57.3-42.5 % oint ophthalmic ointment       levothyroxine (SYNTHROID) 112 mcg tablet Take 150 mcg by mouth.  omega-3 fatty acids-vitamin e 1,000 mg cap Take 1 Cap by mouth two (2) times a day.  telmisartan-hydrochlorothiazide (MICARDIS HCT) 40-12.5 mg per tablet Take 1 Tab by mouth daily.  ergocalciferol (ERGOCALCIFEROL) 50,000 unit capsule Take 50,000 Units by mouth every seven (7) days. Allergies   Allergen Reactions    Septra [Sulfamethoprim] Other (comments)     Upset stomach, makes patient real sick         PE:     Right upper extremity: There is some diffuse tenderness to palpation of the wrist however there is no ecchymosis edema or effusion noted. Range of motion of the wrist does not cause severe pain however there is some mild pain. Patient has full range of motion of her ring finger with minimal pain or discomfort. Imaging:     Plain films of the right wrist and hand does not show any acute fracture or dislocation. There are only mild degenerative changes noted. These plain films are dated 1/27/2020        ICD-10-CM ICD-9-CM    1. Other sprain of right ring finger, initial encounter S63.694A 842.19 CANCELED: AMB POC XRAY, FINGER(S), 2+ VIEWS   2. Right wrist sprain, sequela S63.501S 905.7 AMB SUPPLY ORDER      MRI WRIST RT WO CONT      CANCELED: AMB POC XRAY, WRIST; COMPLETE, 3+ VIE   3. Right hand pain M79.641 729.5 AMB POC XRAY, HAND; 3+ VIEWS       Plan:     I have given her a wrist brace to wear for comfort.     We will get an MRI of the right wrist without contrast to rule out a soft tissue injury or possible occult fracture. Follow-up in 2 weeks for reevaluation and MRI follow-up.     Plan was reviewed with patient, who verbalized agreement and understanding of the plan

## 2020-01-27 NOTE — PROGRESS NOTES
1. Have you been to the ER, urgent care clinic since your last visit? Hospitalized since your last visit? No    2. Have you seen or consulted any other health care providers outside of the [address] [place] [address] since your last visit? Include any pap smears or colon screening.  No

## 2020-02-03 ENCOUNTER — HOSPITAL ENCOUNTER (OUTPATIENT)
Age: 70
Discharge: HOME | End: 2020-02-03
Attending: ORTHOPAEDIC SURGERY
Payer: MEDICARE

## 2020-02-03 DIAGNOSIS — S63.501S RIGHT WRIST SPRAIN, SEQUELA: ICD-10-CM

## 2020-02-03 PROCEDURE — 73221 MRI JOINT UPR EXTREM W/O DYE: CPT

## 2020-02-06 ENCOUNTER — OFFICE VISIT (OUTPATIENT)
Dept: ORTHOPEDIC SURGERY | Age: 70
End: 2020-02-06

## 2020-02-06 VITALS
BODY MASS INDEX: 30.36 KG/M2 | HEART RATE: 70 BPM | HEIGHT: 62 IN | RESPIRATION RATE: 14 BRPM | TEMPERATURE: 98.3 F | WEIGHT: 165 LBS | OXYGEN SATURATION: 97 %

## 2020-02-06 DIAGNOSIS — S52.531S CLOSED COLLES' FRACTURE OF RIGHT RADIUS, SEQUELA: ICD-10-CM

## 2020-02-06 DIAGNOSIS — M65.831 EXTENSOR TENOSYNOVITIS OF RIGHT WRIST: ICD-10-CM

## 2020-02-06 DIAGNOSIS — R20.0 NUMBNESS AND TINGLING OF RIGHT HAND: Primary | ICD-10-CM

## 2020-02-06 DIAGNOSIS — R20.2 NUMBNESS AND TINGLING OF RIGHT HAND: Primary | ICD-10-CM

## 2020-02-06 NOTE — PROGRESS NOTES
Kathie Gayle is a 77 y.o. female right handed unknown employment. Worker's Compensation and legal considerations: not known. Vitals:    02/06/20 0958   Pulse: 70   Resp: 14   Temp: 98.3 °F (36.8 °C)   TempSrc: Oral   SpO2: 97%   Weight: 165 lb (74.8 kg)   Height: 5' 2\" (1.575 m)   PainSc:   0 - No pain   PainLoc: Wrist           Chief Complaint   Patient presents with    Wrist Pain     RIGHT WRIST PAIN       HPI: Patient comes in today for MRI follow-up after having wrist pain on the right side since December. She has been in a thumb spica brace since her last visit and she reports Improvement in her symptoms. Initial HPI: Patient comes in today with complaints of right hand pain and wrist pain after a fall. She reports the pain is persisted in her wrist as well as some tenderness in her ring finger with range of motion. Date of onset:  12/2019    Injury: Yes: Comment: fall    Prior Treatment:  No    Numbness/ Tingling: No    ROS: Review of Systems - General ROS: negative  Respiratory ROS: no cough, shortness of breath, or wheezing  Cardiovascular ROS: no chest pain or dyspnea on exertion  Musculoskeletal ROS: positive for - pain in finger - right and wrist - right  Neurological ROS: negative  Dermatological ROS: negative    Past Medical History:   Diagnosis Date    Acquired ventricular hypertrophy     Allergic rhinitis     Bladder incontinence     Cardiac echocardiogram 05/11/2016    On-Sight Ultrasound:  EF 48%. No RWMA on segments seen. Mild conc LVH. Gr 1 DDfx. Normal RVSP. Mild TR.  Cardiac nuclear imaging test, low risk 06/15/2016    Low risk. No ischemia or prior infarction. No RWMA. EF 71%. Neg EKG on maximal EST. Ex time 8 min 45 sec.     Cardiomyopathy ([address]     Cardiovascular disease     Esophageal reflux     Essential hypertension     Hyperlipidemia     Numerous moles     Sleep apnea     Thyroid disease     Vitamin D deficiency        Past Surgical History: Procedure Laterality Date    APPENDECTOMY      HX CATARACT REMOVAL      HX LEEP PROCEDURE         Current Outpatient Medications   Medication Sig Dispense Refill    telmisartan (MICARDIS) 40 mg tablet       mirabegron ER (MYRBETRIQ) 50 mg ER tablet Take 1 Tab by mouth daily. 90 Tab 3    cetirizine (ZYRTEC) 10 mg tablet       pitavastatin calcium (LIVALO) 2 mg tablet Take  by mouth daily.  pantoprazole (PROTONIX) 40 mg tablet Take  by mouth.  REFRESH TEARS 0.5 % drop ophthalmic solution       meloxicam (MOBIC) 7.5 mg tablet       REFRESH P.M. 57.3-42.5 % oint ophthalmic ointment       levothyroxine (SYNTHROID) 112 mcg tablet Take 150 mcg by mouth.  omega-3 fatty acids-vitamin e 1,000 mg cap Take 1 Cap by mouth two (2) times a day.  telmisartan-hydrochlorothiazide (MICARDIS HCT) 40-12.5 mg per tablet Take 1 Tab by mouth daily.  ergocalciferol (ERGOCALCIFEROL) 50,000 unit capsule Take 50,000 Units by mouth every seven (7) days. Allergies   Allergen Reactions    Septra [Sulfamethoprim] Other (comments)     Upset stomach, makes patient real sick         PE:     Right upper extremity: There is some diffuse tenderness to palpation of the wrist however there is no ecchymosis edema or effusion noted. Range of motion of the wrist does not cause severe pain however there is some mild pain. Patient has full range of motion of her ring finger with minimal pain or discomfort. Imagin/2020 MRI Right Wrist  IMPRESSION:  1. Subacute mildly comminuted nondisplaced intra-articular distal radial  metaphysis fracture. 2.  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 Small ganglion  cyst at the volar aspect of the triscaphe joint. 3.  Mild to moderate extensor carpi ulnaris tendinosis with suspected short  segment longitudinal split tear just distal to the ulnar styloid.  Trace abductor  pollicis longus, extensor pollicis brevis, extensor carpi radialis  longus/brevis, and extensor pollicis longus tenosynovitis. 4.  Mild cystic change at the ulnar base of the lunate which can be seen with  ulnar impaction syndrome; however the TFCC is unremarkable in appearance. Plain films of the right wrist and hand does not show any acute fracture or dislocation. There are only mild degenerative changes noted. These plain films are dated 1/27/2020        ICD-10-CM ICD-9-CM    1. Numbness and tingling of right hand R20.0 782. 0 EMG ONE EXTREMITY UPPER RT    R20.2  NCV/LAT MOTOR PER NERVE UP/RT   2. Extensor tenosynovitis of right wrist M65.831 727.05    3. Closed Colles' fracture of right radius, sequela S52.531S 905.2        Plan:     As the patient has a nondisplaced distal radius fracture we will continue brace wear for the next 4 weeks. Since she has been having long-term numbness and tingling in her little finger on the right even prior to her injury we will get a right upper extremity EMG and nerve conduction study.     Follow-up after EMG nerve conduction study and get repeat right wrist x-ray at this    Plan was reviewed with patient, who verbalized agreement and understanding of the plan

## 2020-02-06 NOTE — PROGRESS NOTES
1. Have you been to the ER, urgent care clinic since your last visit? Hospitalized since your last visit? No    2. Have you seen or consulted any other health care providers outside of the [address] [place] [address] since your last visit? Include any pap smears or colon screening.  No

## 2020-02-28 ENCOUNTER — OFFICE VISIT (OUTPATIENT)
Dept: ORTHOPEDIC SURGERY | Age: 70
End: 2020-02-28

## 2020-02-28 VITALS
HEART RATE: 75 BPM | OXYGEN SATURATION: 97 % | BODY MASS INDEX: 30.69 KG/M2 | HEIGHT: 62 IN | TEMPERATURE: 98 F | RESPIRATION RATE: 20 BRPM | WEIGHT: 166.8 LBS | DIASTOLIC BLOOD PRESSURE: 88 MMHG | SYSTOLIC BLOOD PRESSURE: 136 MMHG

## 2020-02-28 DIAGNOSIS — R20.2 NUMBNESS AND TINGLING OF RIGHT HAND: Primary | ICD-10-CM

## 2020-02-28 DIAGNOSIS — R20.0 NUMBNESS AND TINGLING OF RIGHT HAND: Primary | ICD-10-CM

## 2020-02-28 NOTE — LETTER
2/28/20 Patient: Jaki Alvarenga YOB: 1950 Date of Visit: 2/28/2020 Maximo Rubio MD 
[address] [facility] [address] VIA Facsimile: [phone] Ilya Smith, [address] [address] [place] [id number] VIA In Basket Dear MD Ilya Hackett, DO, Thank you for referring Ms. Freida Jaffe to [facility] for evaluation. My notes for this consultation are attached. If you have questions, please do not hesitate to call me. I look forward to following your patient along with you.  
 
 
Sincerely, 
 
Cris Thomas MD

## 2020-02-28 NOTE — PROGRESS NOTES
[facility] [address]  [address], [address]  [place], [address] [place] [address] Street  Phone: [phone]  Fax: [phone]        Martine Volodymyr  : 1950  PCP: Yue Calvert MD  2020    ELECTROMYOGRAPHY AND NERVE CONDUCTION STUDIES    Radha Lopez was referred by Dr. Conchita Tucker for electrodiagnostic evaluation of right wrist pain, numbness, and tingling. NCV & EMG Findings:  All nerve conduction studies (as indicated in the following tables) were within normal limits. All examined muscles (as indicated in the following table) showed no evidence of electrical instability    INTERPRETATION  This was a normal nerve conduction and EMG study showing there to be no signs of neuropathy, myopathy, or radiculopathy in the nerves and muscles tested. CLINICAL INTERPRETATION  There are no electrodiagnostic findings correlating with her symptoms. HISTORY OF PRESENT ILLNESS  Radha Lopez is a 77 y.o. female. Pt presents today for RUE EMG evaluation of right wrist pain with burning, numbness, and tingling in the right hand. Pt notes that her pain began after she fell in her kitchen in 2019 when she tripped over her . Her wrist MRI revealed a subacute mildly comminuted nondisplaced intra-articular distal radial metaphysis fracture. Pt reports a burning pain in her right pinky finger x 1 year (prior to fall). PAST MEDICAL HISTORY   Past Medical History:   Diagnosis Date    Acquired ventricular hypertrophy     Allergic rhinitis     Bladder incontinence     Cardiac echocardiogram 2016    On-Sight Ultrasound:  EF 48%. No RWMA on segments seen. Mild conc LVH. Gr 1 DDfx. Normal RVSP. Mild TR.  Cardiac nuclear imaging test, low risk 06/15/2016    Low risk. No ischemia or prior infarction. No RWMA. EF 71%. Neg EKG on maximal EST. Ex time 8 min 45 sec.     Cardiomyopathy ([address]     Cardiovascular disease     Esophageal reflux  Essential hypertension     Hyperlipidemia     Numerous moles     Sleep apnea     Thyroid disease     Vitamin D deficiency        Past Surgical History:   Procedure Laterality Date    APPENDECTOMY      HX CATARACT REMOVAL      HX LEEP PROCEDURE     . MEDICATIONS    Current Outpatient Medications   Medication Sig Dispense Refill    telmisartan (MICARDIS) 40 mg tablet       mirabegron ER (MYRBETRIQ) 50 mg ER tablet Take 1 Tab by mouth daily. 90 Tab 3    cetirizine (ZYRTEC) 10 mg tablet       pitavastatin calcium (LIVALO) 2 mg tablet Take  by mouth daily.  pantoprazole (PROTONIX) 40 mg tablet Take  by mouth.  REFRESH TEARS 0.5 % drop ophthalmic solution       meloxicam (MOBIC) 7.5 mg tablet       REFRESH P.M. 57.3-42.5 % oint ophthalmic ointment       levothyroxine (SYNTHROID) 112 mcg tablet Take 150 mcg by mouth.  omega-3 fatty acids-vitamin e 1,000 mg cap Take 1 Cap by mouth two (2) times a day.  telmisartan-hydrochlorothiazide (MICARDIS HCT) 40-12.5 mg per tablet Take 1 Tab by mouth daily.  ergocalciferol (ERGOCALCIFEROL) 50,000 unit capsule Take 50,000 Units by mouth every seven (7) days.           ALLERGIES  Allergies   Allergen Reactions    Septra [Sulfamethoprim] Other (comments)     Upset stomach, makes patient real sick          SOCIAL HISTORY    Social History     Socioeconomic History    Marital status:      Spouse name: Not on file    Number of children: Not on file    Years of education: Not on file    Highest education level: Not on file   Tobacco Use    Smoking status: Current Some Day Smoker    Smokeless tobacco: Never Used   Substance and Sexual Activity    Alcohol use: No    Drug use: No       FAMILY HISTORY  Family History   Problem Relation Age of Onset    Heart Attack Brother     High Cholesterol Father     Arthritis-osteo Father     Breast Cancer Sister          PHYSICAL EXAMINATION  Visit Vitals  /88 (BP 1 Location: Left arm, BP Patient Position: Sitting)   Pulse 75   Temp 98 °F (36.7 °C) (Oral)   Resp 20   Ht 5' 2\" (1.575 m)   Wt 166 lb 12.8 oz (75.7 kg)   SpO2 97%   BMI 30.51 kg/m²       Pain Assessment  2/28/2020   Location of Pain Arm   Location Modifiers Right   Severity of Pain 2   Quality of Pain Burning   Quality of Pain Comment \"numbness\"   Duration of Pain -   Frequency of Pain -   Aggravating Factors -   Relieving Factors -   Result of Injury No   Work-Related Injury -   Type of Injury -           Constitutional:  Well developed, well nourished, in no acute distress. Psychiatric: Affect and mood are appropriate. Integumentary: No rashes or abrasions noted on exposed areas.           NCV & EMG Findings:  Nerve Conduction Studies  Anti Sensory Summary Table     Stim Site NR Peak (ms) Norm Peak (ms) P-T Amp (µV) Norm P-T Amp Site1 Site2 Delta-P (ms) Dist (cm) Juan Alberto (m/s) Norm Juan Alberto (m/s)   Right Median Anti Sensory (2nd Digit)   Wrist    2.9 <3.6 35.7 >10 Wrist 2nd Digit 2.9 14.0 48 >39   Right Radial Anti Sensory (Base 1st Digit)   Wrist    2.1 <3.1 47.2  Wrist Base 1st Digit 2.1 0.0     Right Ulnar Anti Sensory (5th Digit)   Wrist    3.0 <3.7 51.5 >15.0 Wrist 5th Digit 3.0 14.0 47 >38     Motor Summary Table     Stim Site NR Onset (ms) Norm Onset (ms) O-P Amp (mV) Norm O-P Amp Site1 Site2 Delta-0 (ms) Dist (cm) Juan Alberto (m/s) Norm Juan Alberto (m/s)   Right Median Motor (Abd Poll Brev)   Wrist    3.1 <4.2 10.5 >5 Elbow Wrist 3.9 20.0 51 >50   Elbow    7.0  9.2          Right Ulnar Motor (Abd Dig Min)   Wrist    2.7 <4.2 9.2 >3 B Elbow Wrist 2.7 17.0 63 >53   B Elbow    5.4  7.9  A Elbow B Elbow 1.6 10.0 63 >53   A Elbow    7.0  8.1            EMG     Side Muscle Nerve Root Ins Act Fibs Psw Amp Dur Poly Recrt Int Avelar Milljanusz de Yécora Comment   Right Deltoid Axillary C5-6 Nml Nml Nml Nml Nml 0 Nml Nml    Right Biceps Musculocut C5-6 Nml Nml Nml Nml Nml 0 Nml Nml    Right Triceps Radial C6-7-8 Nml Nml Nml Nml Nml 0 Nml Nml    Right PronatorTeres Median C6-7 Nml Nml Nml Nml Nml 0 Nml Nml    Right Ext Digitorum Radial (Post Int) C7-8 Nml Nml Nml Nml Nml 0 Nml Nml    Right Abd Poll Brev Median C8-T1 Nml Nml Nml Nml Nml 0 Nml Nml    Right Abd Dig Min Ulnar C8-T1 Nml Nml Nml Nml Nml 0 Nml Nml    Right 1stDorInt Ulnar C8-T1 Nml Nml Nml Nml Nml 0 Nml Nml        Nerve Conduction Studies  Anti Sensory Left/Right Comparison     Stim Site L Lat (ms) R Lat (ms) L-R Lat (ms) L Amp (µV) R Amp (µV) L-R Amp (%) Site1 Site2 L Juan Alberto (m/s) R Juan Alberto (m/s) L-R Juan Alberto (m/s)   Median Anti Sensory (2nd Digit)   Wrist  2.9   35.7  Wrist 2nd Digit  48    Radial Anti Sensory (Base 1st Digit)   Wrist  2.1   47.2  Wrist Base 1st Digit      Ulnar Anti Sensory (5th Digit)   Wrist  3.0   51.5  Wrist 5th Digit  47      Motor Left/Right Comparison     Stim Site L Lat (ms) R Lat (ms) L-R Lat (ms) L Amp (mV) R Amp (mV) L-R Amp (%) Site1 Site2 L Juan Alberto (m/s) R Juan Alberto (m/s) L-R Juan Alberto (m/s)   Median Motor (Abd Poll Brev)   Wrist  3.1   10.5  Elbow Wrist  51    Elbow  7.0   9.2         Ulnar Motor (Abd Dig Min)   Wrist  2.7   9.2  B Elbow Wrist  63    B Elbow  5.4   7.9  A Elbow B Elbow  63    A Elbow  7.0   8.1               Waveforms:                     [facility]  OFFICE PROCEDURE PROGRESS NOTE        Chart reviewed for the following:   Caridad STEIN, have reviewed the History, Physical and updated the Allergic reactions for Paulina Terrell [address] performed immediately prior to start of procedure:   Caridad STEIN, have performed the following reviews on [address] [place] [address] prior to the start of the procedure:            * Patient was identified by name and date of birth   * Agreement on procedure being performed was verified  * Risks and Benefits explained to the patient  * Procedure site verified and marked as necessary  * Patient was positioned for comfort  * Consent was signed and verified     Time: 10:18 AM      Date of procedure: 2/28/2020    Procedure performed by: Loreto Willis MD    Provider accompanied by: [place]. Patient accompanied by: Self.     How tolerated by patient: tolerated the procedure well with no complications    Post Procedural Pain Scale: 0 - No Hurt    Comments: none    Written by Sonali Barnes, [address] [place] [address] [address] as dictated by Lo Roche MD

## 2020-03-02 ENCOUNTER — OFFICE VISIT (OUTPATIENT)
Dept: ORTHOPEDIC SURGERY | Age: 70
End: 2020-03-02

## 2020-03-02 VITALS
HEART RATE: 81 BPM | SYSTOLIC BLOOD PRESSURE: 153 MMHG | HEIGHT: 62 IN | OXYGEN SATURATION: 99 % | DIASTOLIC BLOOD PRESSURE: 103 MMHG | WEIGHT: 164.6 LBS | TEMPERATURE: 97.5 F | BODY MASS INDEX: 30.29 KG/M2 | RESPIRATION RATE: 16 BRPM

## 2020-03-02 DIAGNOSIS — S52.531S CLOSED COLLES' FRACTURE OF RIGHT RADIUS, SEQUELA: Primary | ICD-10-CM

## 2020-03-02 DIAGNOSIS — M65.831 EXTENSOR TENOSYNOVITIS OF RIGHT WRIST: ICD-10-CM

## 2020-03-02 DIAGNOSIS — M67.441 DIGITAL MUCINOUS CYST OF FINGER OF RIGHT HAND: ICD-10-CM

## 2020-03-02 NOTE — PROGRESS NOTES
1. Have you been to the ER, urgent care clinic since your last visit? Hospitalized since your last visit? No    2. Have you seen or consulted any other health care providers outside of the [address] [place] [address] since your last visit? Include any pap smears or colon screening.  No

## 2020-03-12 ENCOUNTER — HOSPITAL ENCOUNTER (OUTPATIENT)
Dept: PHYSICAL THERAPY | Age: 70
Discharge: HOME | End: 2020-03-12
Payer: MEDICARE

## 2020-03-12 PROCEDURE — 97035 APP MDLTY 1+ULTRASOUND EA 15: CPT

## 2020-03-12 PROCEDURE — 97140 MANUAL THERAPY 1/> REGIONS: CPT

## 2020-03-12 PROCEDURE — 97165 OT EVAL LOW COMPLEX 30 MIN: CPT

## 2020-03-12 NOTE — PROGRESS NOTES
[facility]  [address]  [place], [address]  [phone] fax    Plan of Care/Statement of Necessity for Occupational Therapy Services    Patient name: Meseret Zarate Start of Care: 3/12/2020   Referral source: Rony Caro DO : 1950    Medical Diagnosis: Right wrist pain [M25.531]  Payor: VA MEDICARE / Plan: VA MEDICARE PART A & B / Product Type: Medicare /  Onset Date:2019    Treatment Diagnosis: Right wrist pain   Prior Hospitalization: see medical history Provider#: [id number]   Medications: Verified on Patient summary List    Comorbidities: Thyroid problems, high blood pressure   Prior Level of Function: Independent with ADL/IADL tasks without functional limitations of right hand. The Plan of Care and following information is based on the information from the initial evaluation. Assessment/ key information: Patient is a right hand 72 y.o. female with a chief complaint  of right wrist and thumb pain secondary to a mechanical fall in her kitchen while she was carrying a pan on 2019. Pt presented to skilled OT today with limitations in right wrist ROM and decreased right hand  strength. There is pain with palpation to the thumb basal joint. Imaging revealed a mildly comminuted nondisplaced intra-articular distal radial metaphysis fracture that is completely healed. There is advanced basal joint arthrosis. Mild to moderate extensor carpi ulnaris tendinosis with suspected short segment longitudinal split tear just distal to the ulnar styloid. There is mild cystic change at the ulnar base of the lunate which can be seen with  ulnar impaction syndrome. Pt reports severe difficulty with carrying groceries, dressing tasks and laundry tasks. Patient received an initial evaluation today followed by education as to diagnosis, precautions and treatment plan.   Patient was provided with a basic home exercise program including activity modifications and splint wear. Pt will benefit to continue skilled OT services in order to address deficits to improve quality of life. Evaluation Complexity: History LOW Complexity : Brief history review  Examination LOW Complexity : 1-3 performance deficits relating to physical, cognitive , or psychosocial skils that result in activity limitations and / or participation restrictions  Clinical Decision Making LOW Complexity : No comorbidities that affect functional and no verbal or physical assistance needed to complete eval tasks   Overall Complexity Rating: LOW     Problem List: Pain effecting function, Decreased range of motion, Decreased strength, Edema effecting function, Decreased coordination/prehension, Decreased ADL/functional abilities , Decreased activity tolerance, Decreased flexibility/joint mobility and Sensability   Treatment Plan may include any combination of the following: Therapeutic exercise, Therapeutic activities, Neuromuscular re-education, Physical agent/modality, Manual therapy, Splinting/orthoses, Patient education and ADLs/IADLs  Patient / Family readiness to learn indicated by: asking questions, trying to perform skills and interest  Persons(s) to be included in education:   patient (P)  Barriers to Learning/Limitations: None  Patient Goal (s): for pain to be gone,and be juan to lift groceries.   Patient Self Reported Health Status: good  Rehabilitation Potential: good  Short Term Goals: To be accomplished in 2  weeks:  1. Patient will be compliant with initial home exercise program to take an active role in their rehabilitation process. 2. Patient will demonstrate a good understanding of their condition and strategies for self-management. Long Term Goals:  To be accomplished in 4 weeks:              1. Patient will have 55 degrees of right wrist extension in order to increase ease with ADL's such as bathing, eating and dressing and to eventually bear weight thru the right upper extremity as in pushing up from a chair. 2. Patient will have 65 degrees of right wrist flexion in order to perform hygiene tasks and /or work with tools such as a screwdriver. 3. Pt will have 30 pounds of  in the right hand to allow for functional grasp for all ADL activities including dressing, bathing and self care. 4. Patient will show a 10 point improvement on FOTO functional status measure to improve overall functional performance. Frequency / Duration: Patient to be seen 2 times per week for 4 weeks:   Patient/ Caregiver education and instruction: Diagnosis, prognosis, self care, activity modification, brace/ splint application and exercises  [x]  Plan of care has been reviewed with [facility]  Certification Period: 3/12/2020 - 4/10/2020    Sudha Galicia OT 3/12/2020 4:10 PM    ________________________________________________________________________    I certify that the above Therapy Services are being furnished while the patient is under my care. I agree with the treatment plan and certify that this therapy is necessary.     Physician's Signature:____________Date:_________TIME:________    ** Signature, Date and Time must be completed for valid certification **    Please sign and return to [facility] [address], [address] [place] [address] Str.  [phone] fax

## 2020-03-12 NOTE — PROGRESS NOTES
Hand Therapy Evaluation and Daily Note    Patient Name: Mely Akers  Date:3/12/2020  : 1950  Age: 72 y.o.y/o  [x]  Patient  Verified  Payor: Gabriella Mcmullen / Plan: VA MEDICARE PART A & B / Product Type: Medicare /    Referring Provider: DO Freeman Silva MD Visit:  3/13/2020  Onset Date:  2019  Surgical Date: NA  Surgical Procedure: NA    In time:3:00 PM  Out time:3:55 PM  Total Treatment Time (min): 55  Total Timed Codes (min): 28  1:1 Treatment Time ( [address] only): 55   Visit #: 1 of 8    Treatment Area: Right wrist pain [M25.531]    Precautions:    Hand Dominance: right handed   Hand Involved: right    Total Evaluation Time:  27    History of Present Condition:  Patient is a right hand 72 y.o. female with a chief complaint  of right wrist and thumb pain secondary to a mechanical fall in her kitchen while she was carrying a pan on 2019. Pain Rating:   Current: (0-no pain 10-debilitating pain) moderate   At best: (0-no pain 10-debilitating pain) mild  At worst: (0-no pain 10-debilitating pain) severe  Location: right thumb and right wrist  Type:  moderate   Better with: Worse with:     Medications/Allergies/Past Medical History:  See chart; reviewed with patient. Thyroid problems, high blood pressure    Diagnostic Tests: NCV & EMG Findings:  All nerve conduction studies (as indicated in the following tables) were within normal limits.       All examined muscles (as indicated in the following table) showed no evidence of electrical instability     INTERPRETATION  This was a normal nerve conduction and EMG study showing there to be no signs of neuropathy, myopathy, or radiculopathy in the nerves and muscles tested.      CLINICAL INTERPRETATION  There are no electrodiagnostic findings correlating with her symptoms.     Imaging:      3/2/2020 plain films of the right wrist again does not show any illicit fracture or dislocation.   The nondisplaced fracture appears to be completely healed.     2/2020 MRI Right Wrist  IMPRESSION:  1.  Subacute mildly comminuted nondisplaced intra-articular distal radial  metaphysis fracture. 2.  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 Small ganglion  cyst at the volar aspect of the triscaphe joint. 3.  Mild to moderate extensor carpi ulnaris tendinosis with suspected short  segment longitudinal split tear just distal to the ulnar styloid. Trace abductor  pollicis longus, extensor pollicis brevis, extensor carpi radialis  longus/brevis, and extensor pollicis longus tenosynovitis. 4.  Mild cystic change at the ulnar base of the lunate which can be seen with  ulnar impaction syndrome; however the TFCC is unremarkable in appearance.     Plain films of the right wrist and hand does not show any acute fracture or dislocation. There are only mild degenerative changes noted. These plain films are dated 1/27/2020    Prior Level of Function: Independent with ADL/IADL tasks without functional limitations of right hand. Current Level of Function:  Decreased wrist AROM, decreased strength, pain, edema    Social History: Pt lives with and is the caregiver for her  and 2 sons whom all suffer from muscular dystrophy. Occupation/Job Requirements: Unemployed    Observation: slight edema over right wrist and thumb  Scar/incision:   na  Location:  na     Palpation:  Pain with palpation to basal joint    Range of Motion:   Wrist Active/Passive ROM  Wrist 3/12/2020   Right/Left       Flex 62/69       Ext 53/57       UD 30/30       RD 12/16                 Strength:   Measurements: Taken with Josemanuel Dynamometer, in Lbs   Level 2 3/12/2020 Date Date Date Date Date Date   Right 26         Left 35         Deficit          Change                Sensation:    Paresthesias of right small finger. Edema: GIRTH CHART measured in cm  Date: 3/12/2020       Side Right/Left    DPC circum.  19.6/19.5    Wrist Crease 17.0/16.9    FA      Elbow Special Tests: NT  ADLs  Feeding:        []MaxA   []ModA   []Bell   [] CGA   []SBA   [x]Jasbir   []Independent  UE Dressing:       []MaxA   []ModA   []Bell   [] CGA   []SBA   [x]Jasbir   []Independent  LE Dressing:       []MaxA   []ModA   []Bell   [] CGA   []SBA   [x]Jasbir   []Independent  Grooming:       []MaxA   []ModA   []Bell   [] CGA   []SBA   [x]Jasbir   []Independent  Toileting:       []MaxA   []ModA   []Bell   [] CGA   []SBA   [x]Jasbir   []Independent  Bathing:       []MaxA   []ModA   []Bell   [] CGA   []SBA   [x]Jasbir   []Independent  Light Meal Prep:    []MaxA   [x]ModA   []Bell   [] CGA   []SBA   []Jasbir   []Independent  Household/Other: []MaxA   [x]ModA   []Bell   [] CGA   []SBA   []Jasbir   []Independent  Adaptive Equip:     []MaxA   []ModA   []Bell   [] CGA   []SBA   []Jasbir   []Independent  Driving:       []MaxA   []ModA   []Bell   [] CGA   []SBA   [x]Jasbir   []Independent      Todays Treatment:  Patient received an initial evaluation today followed by education as to diagnosis, precautions and treatment plan. Patient was provided with a basic home exercise program including activity modifications and splint wear.       OBJECTIVE  Modality rationale: decrease inflammation, decrease pain and increase tissue extensibility to improve the patients ability to move right wrist and thumb   Min Type Additional Details    [] Estim:  []Unatt       []IFC  []Premod                        []Other:  []w/ice   []w/heat  Position:  Location:    [] Estim: []Att    []TENS instruct  []NMES                    []Other:  []w/US   []w/ice   []w/heat  Position:  Location:    []  Traction: [] Cervical       []Lumbar                       [] Prone          []Supine                       []Intermittent   []Continuous Lbs:  [] before manual  [] after manual   8 [x]  Ultrasound: [x]Continuous   [] Pulsed                           []1MHz   [x]3MHz W/cm2: 1.0  Location: right dorsum wrist and thumb    []  Iontophoresis with dexamethasone Location: [] Take home patch   [] In clinic    []  Ice     []  heat  []  Ice massage  []  Laser   []  Paraffin Position:  Location:    []  Laser with stim  []  Other:  Position:  Location:    []  Vasopneumatic Device Pressure:       [] lo [] med [] hi   Temperature: [] lo [] med [] hi       [x] Skin assessment post-treatment:  [x]intact [x]redness- no adverse reaction    10 min Manual Therapy:  IASTM using tool #4, 6 using sweeping and fanning techniques. Rationale: decrease pain, increase ROM, increase tissue extensibility and decrease edema  to dorsum right wrist and thumb. 10 min Self Care/Home Management: activity modifications, splint wear, treatment plan    Rationale: education  to improve the patients ability to promote healing of injury site. With   [] TE   [] TA   [] neuro   [] other: Patient Education: [x] Review HEP    [] Progressed/Changed HEP based on:   [] positioning   [] body mechanics   [] transfers   [] heat/ice application   [] Splint wear/care   [] Sensory re-education   [] scar management      [] other:      Pain Level (0-10 scale) post treatment: 1/10    Patient will continue to benefit from skilled OT services to modify and progress therapeutic interventions, address ROM deficits, address strength deficits, analyze and address soft tissue restrictions, analyze and cue movement patterns and analyze and modify body mechanics/ergonomics to attain goals. Assessment: Pt presented to skilled OT today with limitations in right wrist ROM and decreased right hand  strength. There is pain with palpation to the thumb basal joint. Imaging revealed a mildly comminuted nondisplaced intra-articular distal radial metaphysis fracture that is completely healed. There is advanced basal joint arthrosis. Mild to moderate extensor carpi ulnaris tendinosis with suspected short segment longitudinal split tear just distal to the ulnar styloid.  There is mild cystic change at the ulnar base of the lunate which can be seen with  ulnar impaction syndrome. Pt reports severe difficulty with carrying groceries, dressing tasks and laundry tasks. Patient received an initial evaluation today followed by education as to diagnosis, precautions and treatment plan. Patient was provided with a basic home exercise program including activity modifications and splint wear. Short Term Goals: To be accomplished in 2  weeks:  1. Patient will be compliant with initial home exercise program to take an active role in their rehabilitation process. Status at Eval: gentle wrist AROM within pain tolerance    2. Patient will demonstrate a good understanding of their condition and strategies for self-management. Status at Eval: pt educated on activity modifications, splint wear, moist heat application, treatment plan    Long Term Goals: To be accomplished in 4 weeks:   1. Patient will have 55 degrees of right wrist extension in order to increase ease with ADL's such as bathing, eating and dressing and to eventually bear weight thru the right upper extremity as in pushing up from a chair. Status at Eval: 53 degrees    2. Patient will have 65 degrees of right wrist flexion in order to perform hygiene tasks and /or work with tools such as a screwdriver. Status at Eval: 62 degrees    3. Pt will have 30 pounds of  in the right hand to allow for functional grasp for all ADL activities including dressing, bathing and self care. Status at eval: 26#    4. Patient will show a 10 point improvement on FOTO functional status measure to improve overall functional performance.   Status at Eval:44    Frequency / Duration: Patient to be seen 2 times per week for 4 weeks:    Patient/ Caregiver education and instruction: Diagnosis, prognosis, self care, activity modification, brace/ splint application and exercises      Certification Period: 3/12/2020 - 4/10/2020    Valery Hicks OT 3/12/2020 3:03 PM

## 2020-03-13 ENCOUNTER — OFFICE VISIT (OUTPATIENT)
Dept: ORTHOPEDIC SURGERY | Age: 70
End: 2020-03-13

## 2020-03-13 VITALS
DIASTOLIC BLOOD PRESSURE: 94 MMHG | HEIGHT: 62 IN | BODY MASS INDEX: 30.29 KG/M2 | HEART RATE: 86 BPM | OXYGEN SATURATION: 99 % | SYSTOLIC BLOOD PRESSURE: 156 MMHG | TEMPERATURE: 98.2 F | RESPIRATION RATE: 16 BRPM | WEIGHT: 164.6 LBS

## 2020-03-13 DIAGNOSIS — M67.441 DIGITAL MUCINOUS CYST OF FINGER OF RIGHT HAND: Primary | ICD-10-CM

## 2020-03-13 DIAGNOSIS — Z01.818 PREOP EXAMINATION: Primary | ICD-10-CM

## 2020-03-13 DIAGNOSIS — M65.831 EXTENSOR TENOSYNOVITIS OF RIGHT WRIST: ICD-10-CM

## 2020-03-13 DIAGNOSIS — S52.531S CLOSED COLLES' FRACTURE OF RIGHT RADIUS, SEQUELA: ICD-10-CM

## 2020-03-13 NOTE — PROGRESS NOTES
Ashley Calzada is a 72 y.o. female right handed unknown employment. Worker's Compensation and legal considerations: not known. Vitals:    03/13/20 0856   BP: (!) 156/94   Pulse: 86   Resp: 16   Temp: 98.2 °F (36.8 °C)   TempSrc: Oral   SpO2: 99%   Weight: 164 lb 9.6 oz (74.7 kg)   Height: 5' 2\" (1.575 m)   PainSc:   5   PainLoc: Finger           Chief Complaint   Patient presents with    Hand Pain     right ring finger mass       HPI: Patient comes in today for follow-up of her previous right distal radius fracture that is healed and she has been in therapy. She reports that therapy is going well however her main concern today is her ring finger mass. She reports this was excised and drained by 1 of her physicians in the office but it came back more painful. She reports any pain with range of motion. Previous HPI: Patient comes in today for right upper extremity EMG and nerve conduction study follow-up. She was previously diagnosed with a nondisplaced right distal radius fracture. She reports she is still having some pain in the wrist however it is improved. Previous HPI: Patient comes in today for MRI follow-up after having wrist pain on the right side since December. She has been in a thumb spica brace since her last visit and she reports Improvement in her symptoms. Initial HPI: Patient comes in today with complaints of right hand pain and wrist pain after a fall. She reports the pain is persisted in her wrist as well as some tenderness in her ring finger with range of motion.     Date of onset:  12/2019    Injury: Yes: Comment: fall    Prior Treatment:  No    Numbness/ Tingling: No    ROS: Review of Systems - General ROS: negative  Respiratory ROS: no cough, shortness of breath, or wheezing  Cardiovascular ROS: no chest pain or dyspnea on exertion  Musculoskeletal ROS: positive for - pain in finger - right and wrist - right  Neurological ROS: negative  Dermatological ROS: negative  Psychological ROS: negative  ENT ROS: negative  Allergy and Immunology ROS: negative  Hematological and Lymphatic ROS: negative  Gastrointestinal ROS: no abdominal pain, change in bowel habits, or black or bloody stools    Past Medical History:   Diagnosis Date    Acquired ventricular hypertrophy     Allergic rhinitis     Bladder incontinence     Cardiac echocardiogram 05/11/2016    On-Sight Ultrasound:  EF 48%. No RWMA on segments seen. Mild conc LVH. Gr 1 DDfx. Normal RVSP. Mild TR.  Cardiac nuclear imaging test, low risk 06/15/2016    Low risk. No ischemia or prior infarction. No RWMA. EF 71%. Neg EKG on maximal EST. Ex time 8 min 45 sec.  Cardiomyopathy ([address]     Cardiovascular disease     Esophageal reflux     Essential hypertension     Hyperlipidemia     Numerous moles     Sleep apnea     Thyroid disease     Vitamin D deficiency        Past Surgical History:   Procedure Laterality Date    APPENDECTOMY      HX CATARACT REMOVAL      HX LEEP PROCEDURE         Current Outpatient Medications   Medication Sig Dispense Refill    telmisartan (MICARDIS) 40 mg tablet       mirabegron ER (MYRBETRIQ) 50 mg ER tablet Take 1 Tab by mouth daily. 90 Tab 3    cetirizine (ZYRTEC) 10 mg tablet       pitavastatin calcium (LIVALO) 2 mg tablet Take  by mouth daily.  pantoprazole (PROTONIX) 40 mg tablet Take  by mouth.  REFRESH TEARS 0.5 % drop ophthalmic solution       meloxicam (MOBIC) 7.5 mg tablet       REFRESH P.M. 57.3-42.5 % oint ophthalmic ointment       levothyroxine (SYNTHROID) 112 mcg tablet Take 150 mcg by mouth.  omega-3 fatty acids-vitamin e 1,000 mg cap Take 1 Cap by mouth two (2) times a day.  telmisartan-hydrochlorothiazide (MICARDIS HCT) 40-12.5 mg per tablet Take 1 Tab by mouth daily.  ergocalciferol (ERGOCALCIFEROL) 50,000 unit capsule Take 50,000 Units by mouth every seven (7) days.          Allergies   Allergen Reactions    Septra [Sulfamethoprim] Other (comments)     Upset stomach, makes patient real sick         PE:     Right upper extremity: There is a cystic-appearing mass that is very superficial over the DIP joint but appears to track down to the DIP joint. It is tender to palpation and fluid-filled with a clear gelatinous substance. Range of motion is limited due to his pain. Sensation is intact distally. Strength is within normal limits. NCV & EMG Findings:  All nerve conduction studies (as indicated in the following tables) were within normal limits.       All examined muscles (as indicated in the following table) showed no evidence of electrical instability     INTERPRETATION  This was a normal nerve conduction and EMG study showing there to be no signs of neuropathy, myopathy, or radiculopathy in the nerves and muscles tested.      CLINICAL INTERPRETATION  There are no electrodiagnostic findings correlating with her symptoms. Imaging:     3/2/2020 plain films of the right wrist again does not show any illicit fracture or dislocation. The nondisplaced fracture appears to be completely healed. 2/2020 MRI Right Wrist  IMPRESSION:  1. Subacute mildly comminuted nondisplaced intra-articular distal radial  metaphysis fracture. 2.  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 Small ganglion  cyst at the volar aspect of the triscaphe joint. 3.  Mild to moderate extensor carpi ulnaris tendinosis with suspected short  segment longitudinal split tear just distal to the ulnar styloid. Trace abductor  pollicis longus, extensor pollicis brevis, extensor carpi radialis  longus/brevis, and extensor pollicis longus tenosynovitis. 4.  Mild cystic change at the ulnar base of the lunate which can be seen with  ulnar impaction syndrome; however the TFCC is unremarkable in appearance. Plain films of the right wrist and hand does not show any acute fracture or dislocation.   There are only mild degenerative changes noted.  These plain films are dated 1/27/2020        ICD-10-CM ICD-9-CM    1. Digital mucinous cyst of finger of right hand M67.441 727.43    2. Closed Colles' fracture of right radius, sequela S52.531S 905.2    3. Extensor tenosynovitis of right wrist M65.831 727.05        Plan: We had a thorough discussion about operative and nonoperative treatment and have decided to move forward with surgical excision of the mucous cyst on her right ring finger. We will set her up for excision as soon as possible. Follow-up 2 weeks postop.     Plan was reviewed with patient, who verbalized agreement and understanding of the plan

## 2020-03-14 ENCOUNTER — HOSPITAL ENCOUNTER (OUTPATIENT)
Dept: LAB | Age: 70
Discharge: HOME | End: 2020-03-14
Payer: MEDICARE

## 2020-03-14 DIAGNOSIS — Z01.818 PREOP EXAMINATION: ICD-10-CM

## 2020-03-14 LAB
ALBUMIN SERPL-MCNC: 3.5 G/DL (ref 3.4–5)
ALBUMIN/GLOB SERPL: 1 {RATIO} (ref 0.8–1.7)
ALP SERPL-CCNC: 123 U/L (ref 45–117)
ALT SERPL-CCNC: 28 U/L (ref 13–56)
ANION GAP SERPL CALC-SCNC: 4 MMOL/L (ref 3–18)
AST SERPL-CCNC: 15 U/L (ref 10–38)
ATRIAL RATE: 63 BPM
BASOPHILS # BLD: 0 K/UL (ref 0–0.1)
BASOPHILS NFR BLD: 1 % (ref 0–2)
BILIRUB SERPL-MCNC: 0.4 MG/DL (ref 0.2–1)
BUN SERPL-MCNC: 14 MG/DL (ref 7–18)
BUN/CREAT SERPL: 23 (ref 12–20)
CALCIUM SERPL-MCNC: 8.7 MG/DL (ref 8.5–10.1)
CALCULATED P AXIS, ECG09: 34 DEGREES
CALCULATED R AXIS, ECG10: 2 DEGREES
CALCULATED T AXIS, ECG11: 56 DEGREES
CHLORIDE SERPL-SCNC: 110 MMOL/L (ref 100–111)
CO2 SERPL-SCNC: 28 MMOL/L (ref 21–32)
CREAT SERPL-MCNC: 0.62 MG/DL (ref 0.6–1.3)
DIAGNOSIS, 93000: NORMAL
DIFFERENTIAL METHOD BLD: NORMAL
EOSINOPHIL # BLD: 0.1 K/UL (ref 0–0.4)
EOSINOPHIL NFR BLD: 1 % (ref 0–5)
ERYTHROCYTE [DISTWIDTH] IN BLOOD BY AUTOMATED COUNT: 14.1 % (ref 11.6–14.5)
GLOBULIN SER CALC-MCNC: 3.5 G/DL (ref 2–4)
GLUCOSE SERPL-MCNC: 109 MG/DL (ref 74–99)
HCT VFR BLD AUTO: 43.1 % (ref 35–45)
HGB BLD-MCNC: 14.3 G/DL (ref 12–16)
LYMPHOCYTES # BLD: 2.4 K/UL (ref 0.9–3.6)
LYMPHOCYTES NFR BLD: 38 % (ref 21–52)
MCH RBC QN AUTO: 29.5 PG (ref 24–34)
MCHC RBC AUTO-ENTMCNC: 33.2 G/DL (ref 31–37)
MCV RBC AUTO: 88.9 FL (ref 74–97)
MONOCYTES # BLD: 0.4 K/UL (ref 0.05–1.2)
MONOCYTES NFR BLD: 6 % (ref 3–10)
NEUTS SEG # BLD: 3.5 K/UL (ref 1.8–8)
NEUTS SEG NFR BLD: 54 % (ref 40–73)
P-R INTERVAL, ECG05: 192 MS
PLATELET # BLD AUTO: 220 K/UL (ref 135–420)
PMV BLD AUTO: 10.8 FL (ref 9.2–11.8)
POTASSIUM SERPL-SCNC: 4.1 MMOL/L (ref 3.5–5.5)
PROT SERPL-MCNC: 7 G/DL (ref 6.4–8.2)
Q-T INTERVAL, ECG07: 436 MS
QRS DURATION, ECG06: 84 MS
QTC CALCULATION (BEZET), ECG08: 446 MS
RBC # BLD AUTO: 4.85 M/UL (ref 4.2–5.3)
SODIUM SERPL-SCNC: 142 MMOL/L (ref 136–145)
VENTRICULAR RATE, ECG03: 63 BPM
WBC # BLD AUTO: 6.4 K/UL (ref 4.6–13.2)

## 2020-03-14 PROCEDURE — 80053 COMPREHEN METABOLIC PANEL: CPT

## 2020-03-14 PROCEDURE — 36415 COLL VENOUS BLD VENIPUNCTURE: CPT

## 2020-03-14 PROCEDURE — 93005 ELECTROCARDIOGRAM TRACING: CPT

## 2020-03-14 PROCEDURE — 85025 COMPLETE CBC W/AUTO DIFF WBC: CPT

## 2020-03-16 ENCOUNTER — HOSPITAL ENCOUNTER (OUTPATIENT)
Dept: PHYSICAL THERAPY | Age: 70
Discharge: HOME | End: 2020-03-16
Payer: MEDICARE

## 2020-03-16 PROCEDURE — 97035 APP MDLTY 1+ULTRASOUND EA 15: CPT

## 2020-03-16 PROCEDURE — 97140 MANUAL THERAPY 1/> REGIONS: CPT

## 2020-03-16 NOTE — PROGRESS NOTES
OT DAILY TREATMENT NOTE 10-18    Patient Name: Pennie Mckinnon  Date:3/16/2020  : 1950  [x]  Patient  Verified  Payor: [place] MEDICARE / Plan: [place] MEDICARE PART A & B / Product Type: Medicare /    In time:8:03 AM  Out time:8:42 AM  Total Treatment Time (min): 39  Visit #: 2 of 8    Medicare/BCBS Only   Total Timed Codes (min):  29 1:1 Treatment Time:  39     Treatment Area: Right wrist pain [M25.531]    SUBJECTIVE  Pain Level (0-10 scale): 2/10  Any medication changes, allergies to medications, adverse drug reactions, diagnosis change, or new procedure performed?: [x] No    [] Yes (see summary sheet for update)  Subjective functional status/changes:   [] No changes reported  \"I haven't been wearing my brace. \"    OBJECTIVE    Modality rationale: decrease inflammation, decrease pain and increase tissue extensibility to improve the patients ability to move right wrist   Min Type Additional Details    [] Estim:  []Unatt       []IFC  []Premod                        []Other:  []w/ice   []w/heat  Position:  Location:    [] Estim: []Att    []TENS instruct  []NMES                    []Other:  []w/US   []w/ice   []w/heat  Position:  Location:    []  Traction: [] Cervical       []Lumbar                       [] Prone          []Supine                       []Intermittent   []Continuous Lbs:  [] before manual  [] after manual   8 [x]  Ultrasound: [x]Continuous   [] Pulsed                           []1MHz   [x]3MHz W/cm2:1.0  Location: right dorsum wrist    []  Iontophoresis with dexamethasone         Location: [] Take home patch   [] In clinic   10 []  Ice     [x]  heat  []  Ice massage  []  Laser   []  Paraffin Position: resting  Location: right wrist    []  Laser with stim  []  Other:  Position:  Location:    []  Vasopneumatic Device Pressure:       [] lo [] med [] hi   Temperature: [] lo [] med [] hi       [x] Skin assessment post-treatment:  [x]intact [x]redness- no adverse reaction    []redness  adverse reaction: 9 min Therapeutic Exercise:  [x] See flow sheet :   Rationale: increase ROM to improve the patients ability to move right wrist without increased pain. 12 min Manual Therapy:  IASTM using tool #4, 6 using sweeping and fanning techniques. Rationale: decrease pain, increase ROM, increase tissue extensibility and decrease edema  to right dorsum wrist and thumb. With   [] TE   [] TA   [] neuro   [] other: Patient Education: [x] Review HEP    [] Progressed/Changed HEP based on:   [] positioning   [] body mechanics   [] transfers   [] heat/ice application   [] Splint wear/care   [] Sensory re-education   [] scar management      [] other:            Other Objective/Functional Measures: reduced pain after modalities. Pain Level (0-10 scale) post treatment: 1/10    ASSESSMENT/Changes in Function: Some discomfort with passive wrist stretches. Reduced pain after modalities. Patient will continue to benefit from skilled OT services to modify and progress therapeutic interventions, address ROM deficits, address strength deficits and analyze and address soft tissue restrictions to attain remaining goals. []  See Plan of Care  []  See progress note/recertification  []  See Discharge Summary         Progress towards goals / Updated goals:  Short Term Goals: To be accomplished in 2  weeks:  1. Patient will be compliant with initial home exercise program to take an active role in their rehabilitation process. Status at Eval: gentle wrist AROM within pain tolerance     2. Patient will demonstrate a good understanding of their condition and strategies for self-management. Status at Eval: pt educated on activity modifications, splint wear, moist heat application, treatment plan  3/16/20 - pt reports not wearing splint because she isn't doing much     Long Term Goals:  To be accomplished in 4 weeks:              1. Patient will have 55 degrees of right wrist extension in order to increase ease with ADL's such as bathing, eating and dressing and to eventually bear weight thru the right upper extremity as in pushing up from a chair. Status at Eval: 53 degrees     2. Patient will have 65 degrees of right wrist flexion in order to perform hygiene tasks and /or work with tools such as a screwdriver. Status at Eval: 62 degrees     3. Pt will have 30 pounds of  in the right hand to allow for functional grasp for all ADL activities including dressing, bathing and self care. Status at eval: 26#     4. Patient will show a 10 point improvement on FOTO functional status measure to improve overall functional performance.   Status at Eval:44    PLAN  []  Upgrade activities as tolerated     [x]  Continue plan of care  []  Update interventions per flow sheet       []  Discharge due to:_  []  Other:_      Gera Armstrong OT 3/16/2020  8:11 AM    Future Appointments   Date Time Provider Rosette rFeeman   3/18/2020  9:30 AM Judy Situ, OT [facility]   3/26/2020 11:00 AM [place] Situ, OT [facility]   3/30/2020  9:40 AM Camila Barber,  [facility]   3/30/2020 10:00 AM [place] Situ, OT [facility]   3/30/2020 11:30 AM HBV CT RM 1 [facility]   4/1/2020  9:40 AM Trevin Pearson MD [address] [place] [address]   4/1/2020 12:00 PM Judy Situ, OT [facility]   4/6/2020 10:00 AM Judy Situ, OT [facility]   4/8/2020 10:00 AM [place] Situ, OT [facility]   4/13/2020 10:00 AM Judy Situ, OT [facility]   4/15/2020 10:00 AM Judy Situ, OT [facility]   5/4/2020 10:10 AM Camila Barber, DO [facility]

## 2020-03-18 ENCOUNTER — HOSPITAL ENCOUNTER (OUTPATIENT)
Dept: PHYSICAL THERAPY | Age: 70
Discharge: HOME | End: 2020-03-18
Payer: MEDICARE

## 2020-03-18 PROCEDURE — 97035 APP MDLTY 1+ULTRASOUND EA 15: CPT

## 2020-03-18 PROCEDURE — 97140 MANUAL THERAPY 1/> REGIONS: CPT

## 2020-03-18 NOTE — PROGRESS NOTES
OT DAILY TREATMENT NOTE 10-18    Patient Name: Mely Akers  Date:3/18/2020  : 1950  [x]  Patient  Verified  Payor: [place] MEDICARE / Plan: VA MEDICARE PART A & B / Product Type: Medicare /    In time:9:29 AM  Out time:10:15 AM  Total Treatment Time (min): 55  Visit #: 3 of 8    Medicare/BCBS Only   Total Timed Codes (min):  34 1:1 Treatment Time:  34     Treatment Area: Right wrist pain [M25.531]    SUBJECTIVE  Pain Level (0-10 scale): 2/10  Any medication changes, allergies to medications, adverse drug reactions, diagnosis change, or new procedure performed?: [x] No    [] Yes (see summary sheet for update)  Subjective functional status/changes:   [] No changes reported  \"I think my finger was fractured also because it really hurts (right ring finger). \"    OBJECTIVE    Modality rationale: decrease inflammation, decrease pain and increase tissue extensibility to improve the patients ability to move right wrist   Min Type Additional Details    [] Estim:  []Unatt       []IFC  []Premod                        []Other:  []w/ice   []w/heat  Position:  Location:    [] Estim: []Att    []TENS instruct  []NMES                    []Other:  []w/US   []w/ice   []w/heat  Position:  Location:    []  Traction: [] Cervical       []Lumbar                       [] Prone          []Supine                       []Intermittent   []Continuous Lbs:  [] before manual  [] after manual   8 [x]  Ultrasound: [x]Continuous   [] Pulsed                           []1MHz   [x]3MHz W/cm2: 1.0  Location: right dorsum wrist and thumb     []  Iontophoresis with dexamethasone         Location: [] Take home patch   [] In clinic   12 []  Ice     [x]  heat  []  Ice massage  []  Laser   []  Paraffin Position: resting  Location: right hand/wrist    []  Laser with stim  []  Other:  Position:  Location:    []  Vasopneumatic Device Pressure:       [] lo [] med [] hi   Temperature: [] lo [] med [] hi       [x] Skin assessment post-treatment:  [x]intact [x]redness- no adverse reaction    []redness  adverse reaction:     18 min Therapeutic Exercise:  [x] See flow sheet :   Rationale: increase ROM to improve the patients ability to move right wrist and FA for functional tasks. 8 min Manual Therapy:  IASTM using tool #4 using sweeping and fanning techniques    Rationale: decrease pain, increase ROM, increase tissue extensibility and decrease edema  to right dorsum wrist.    With   [] TE   [] TA   [] neuro   [] other: Patient Education: [x] Review HEP    [] Progressed/Changed HEP based on:   [] positioning   [] body mechanics   [] transfers   [] heat/ice application   [] Splint wear/care   [] Sensory re-education   [] scar management      [] other:            Other Objective/Functional Measures: Range of Motion:   Wrist Active/Passive ROM  Wrist 3/12/2020   Right/Left  3/18/2020   Right         Flex 62/69  71         Ext 53/57  54         UD 30/30           RD 12/16                               Pain Level (0-10 scale) post treatment: 0/10    ASSESSMENT/Changes in Function: Verbal cueing for wrist maze task completion. Difficulty with passive wrist extension stretches. Improved right wrist flexion. Reduced pain reported at end of treatment session. Patient will continue to benefit from skilled OT services to modify and progress therapeutic interventions, address ROM deficits, address strength deficits, analyze and address soft tissue restrictions, analyze and cue movement patterns and instruct in home and community integration to attain remaining goals. []  See Plan of Care  []  See progress note/recertification  []  See Discharge Summary         Progress towards goals / Updated goals:  Short Term Goals: To be accomplished in 2  weeks:  1. Patient will be compliant with initial home exercise program to take an active role in their rehabilitation process.   Status at Eval: gentle wrist AROM within pain tolerance  3/18/2020 - AROM improving     2. Patient will demonstrate a good understanding of their condition and strategies for self-management. Status at Eval: pt educated on activity modifications, splint wear, moist heat application, treatment plan  3/16/20 - pt reports not wearing splint because she isn't doing much  3/18/2020 - pt reports pain with cooking tasks, carrying pyrex pan     Long Term Goals: To be accomplished in 4 weeks:              1. Patient will have 55 degrees of right wrist extension in order to increase ease with ADL's such as bathing, eating and dressing and to eventually bear weight thru the right upper extremity as in pushing up from a chair. Status at Eval: 53 degrees  3/18/2020 - pain with passive wrist extension stretches     2. Patient will have 65 degrees of right wrist flexion in order to perform hygiene tasks and /or work with tools such as a screwdriver. Status at Eval: 62 degrees  3/18/2020 - 71 degrees, goal met     3. Pt will have 30 pounds of  in the right hand to allow for functional grasp for all ADL activities including dressing, bathing and self care. Status at eval: 26#     4. Patient will show a 10 point improvement on FOTO functional status measure to improve overall functional performance.   Status at Eval:44    PLAN  []  Upgrade activities as tolerated     [x]  Continue plan of care  []  Update interventions per flow sheet       []  Discharge due to:_  []  Other:_      Desire Sharp OT 3/18/2020  9:55 AM    Future Appointments   Date Time Provider Rosette Freeman   3/26/2020 11:00 AM Juan Carlos Voss OT [facility]   3/30/2020 10:00 AM Juan Carlos Voss OT [facility]   3/30/2020 11:30 AM HBV CT RM 1 [facility]   4/1/2020  9:40 AM Attila Medel MD [address] [place] [address]   4/1/2020 12:00 PM Juan Carlos Voss OT [facility]   4/6/2020 10:00 AM Juan Carlos Voss OT [facility]   4/8/2020 10:00 AM Juan Carlos Voss OT [facility]   4/13/2020 10:00 AM Juan Carlos Voss OT [facility]   4/15/2020 10:00 AM Juan Carlos Voss OT [facility] 5/4/2020 10:10 AM Sylvie Simons DO [facility] [address]

## 2020-03-23 ENCOUNTER — HOSPITAL ENCOUNTER (OUTPATIENT)
Dept: PHYSICAL THERAPY | Age: 70
Discharge: HOME | End: 2020-03-23
Payer: MEDICARE

## 2020-03-23 PROCEDURE — 97022 WHIRLPOOL THERAPY: CPT

## 2020-03-23 PROCEDURE — 97140 MANUAL THERAPY 1/> REGIONS: CPT

## 2020-03-23 PROCEDURE — 97035 APP MDLTY 1+ULTRASOUND EA 15: CPT

## 2020-03-23 NOTE — PROGRESS NOTES
OT DAILY TREATMENT NOTE 10-18    Patient Name: Wally Candelaria  Date:3/23/2020  : 1950  [x]  Patient  Verified  Payor: [place] MEDICARE / Plan: [place] MEDICARE PART A & B / Product Type: Medicare /    In time:10:52 AM  Out time:11:37 AM  Total Treatment Time (min): 45  Visit #: 4 of 8    Medicare/BCBS Only   Total Timed Codes (min):  30 1:1 Treatment Time:  29     Treatment Area: Right wrist pain [M25.531]    SUBJECTIVE  Pain Level (0-10 scale): 0/10  Any medication changes, allergies to medications, adverse drug reactions, diagnosis change, or new procedure performed?: [x] No    [] Yes (see summary sheet for update)  Subjective functional status/changes:   [] No changes reported  \"It's not hurting today. I am think the rest and therapy is really helping. \"    OBJECTIVE    Modality rationale: decrease inflammation, decrease pain and increase tissue extensibility to improve the patients ability to move right wrist and thumb   Min Type Additional Details    [] Estim:  []Unatt       []IFC  []Premod                        []Other:  []w/ice   []w/heat  Position:  Location:    [] Estim: []Att    []TENS instruct  []NMES                    []Other:  []w/US   []w/ice   []w/heat  Position:  Location:    []  Traction: [] Cervical       []Lumbar                       [] Prone          []Supine                       []Intermittent   []Continuous Lbs:  [] before manual  [] after manual   8 [x]  Ultrasound: [x]Continuous   [] Pulsed                           []1MHz   [x]3MHz W/cm2:1.0  Location: right radial wrist and thumb    []  Iontophoresis with dexamethasone         Location: [] Take home patch   [] In clinic   15 []  Ice     [x]  Heat - fluidotherapy  []  Ice massage  []  Laser   []  Paraffin Position: AROM  Location: right wrist    []  Laser with stim  []  Other:  Position:  Location:    []  Vasopneumatic Device Pressure:       [] lo [] med [] hi   Temperature: [] lo [] med [] hi       [x] Skin assessment post-treatment: [x]intact [x]redness- no adverse reaction    []redness  adverse reaction:     12 min Therapeutic Exercise:  [x] See flow sheet :   Rationale: increase ROM to improve the patients ability to move right wrist for functional tasks. 10 min Manual Therapy:  IASTM using tools #4, 6 using sweeping and fanning techniques. Rationale: decrease pain, increase ROM, increase tissue extensibility and decrease edema  to dorsum right wrist and thumb. With   [] TE   [] TA   [] neuro   [] other: Patient Education: [x] Review HEP    [] Progressed/Changed HEP based on:   [] positioning   [] body mechanics   [] transfers   [] heat/ice application   [] Splint wear/care   [] Sensory re-education   [] scar management      [] other:            Other Objective/Functional Measures: Range of Motion:   Wrist Active/Passive ROM  Wrist 3/12/2020   Right/Left  3/18/2020   Right  3/23/2020  Right       Flex 62/69  71         Ext 53/57  54  58       UD 30/30           RD 12/16                               Pain Level (0-10 scale) post treatment: 0/10    ASSESSMENT/Changes in Function: Initiated fluidotherapy to reduce pain and encourage wrist movement and pt had no adverse reactions to this. Did not report increased pain with activity. Progressing towards goals. Patient will continue to benefit from skilled OT services to modify and progress therapeutic interventions, address ROM deficits, address strength deficits and instruct in home and community integration to attain remaining goals. []  See Plan of Care  []  See progress note/recertification  []  See Discharge Summary         Progress towards goals / Updated goals:  Short Term Goals: To be accomplished in 2  weeks:  1. Patient will be compliant with initial home exercise program to take an active role in their rehabilitation process.   Status at Eval: gentle wrist AROM within pain tolerance  3/18/2020 - AROM improving  3/23/2020 - pt reports improved pain 0/10, goal met     2. Patient will demonstrate a good understanding of their condition and strategies for self-management. Status at Eval: pt educated on activity modifications, splint wear, moist heat application, treatment plan  3/16/20 - pt reports not wearing splint because she isn't doing much  3/18/2020 - pt reports pain with cooking tasks, carrying pyrex pan     Long Term Goals: To be accomplished in 4 weeks:              1. Patient will have 55 degrees of right wrist extension in order to increase ease with ADL's such as bathing, eating and dressing and to eventually bear weight thru the right upper extremity as in pushing up from a chair. Status at Eval: 53 degrees  3/18/2020 - pain with passive wrist extension stretches  3/23/2020 - 58 degrees, goal met     2. Patient will have 65 degrees of right wrist flexion in order to perform hygiene tasks and /or work with tools such as a screwdriver. Status at Eval: 62 degrees  3/18/2020 - 71 degrees, goal met     3. Pt will have 30 pounds of  in the right hand to allow for functional grasp for all ADL activities including dressing, bathing and self care. Status at eval: 26#     4. Patient will show a 10 point improvement on FOTO functional status measure to improve overall functional performance.   Status at Eval:44    PLAN  []  Upgrade activities as tolerated     [x]  Continue plan of care  []  Update interventions per flow sheet       []  Discharge due to:_  []  Other:_      Michelle Johansen OT 3/23/2020  11:15 AM    Future Appointments   Date Time Provider Rosette Freeman   3/25/2020 11:00 AM Cheryal Zac, OT [facility]   3/30/2020 10:00 AM Cheryal Zac, OT [facility]   3/30/2020 11:30 AM HBV CT RM 1 [facility]   4/1/2020  9:40 AM Kedar Jordan MD [address] [place] [address]   4/1/2020 11:00 AM Cheryal Zac, OT [facility]   4/6/2020 10:00 AM Cheryal Zac, OT [facility]   4/8/2020 10:00 AM Cheryal Zac, OT [facility]   4/13/2020 10:00 AM Cheryal Zac, OT [facility]   4/15/2020 10:00 AM Elen Frank OT [facility]   5/4/2020 10:10 AM DO Enio Martinez

## 2020-03-25 ENCOUNTER — HOSPITAL ENCOUNTER (OUTPATIENT)
Dept: PHYSICAL THERAPY | Age: 70
Discharge: HOME | End: 2020-03-25
Payer: MEDICARE

## 2020-03-25 PROCEDURE — 97035 APP MDLTY 1+ULTRASOUND EA 15: CPT

## 2020-03-25 PROCEDURE — 97022 WHIRLPOOL THERAPY: CPT

## 2020-03-25 PROCEDURE — 97110 THERAPEUTIC EXERCISES: CPT

## 2020-03-25 PROCEDURE — 97140 MANUAL THERAPY 1/> REGIONS: CPT

## 2020-03-25 NOTE — PROGRESS NOTES
OT DAILY TREATMENT NOTE 10-18    Patient Name: Mely Akers  Date:3/25/2020  : 1950  [x]  Patient  Verified  Payor: [place] MEDICARE / Plan: VA MEDICARE PART A & B / Product Type: Medicare /    In time:10:00 AM  Out time:11:00 AM  Total Treatment Time (min): 60  Visit #: 5 of 8    Medicare/BCBS Only   Total Timed Codes (min):  45 1:1 Treatment Time:  60     Treatment Area: Right wrist pain [M25.531]    SUBJECTIVE  Pain Level (0-10 scale): 0/10  Any medication changes, allergies to medications, adverse drug reactions, diagnosis change, or new procedure performed?: [x] No    [] Yes (see summary sheet for update)  Subjective functional status/changes:   [] No changes reported  \"I have gotten so much better. \"    OBJECTIVE    Modality rationale: decrease inflammation, decrease pain and increase tissue extensibility to improve the patients ability to move right wrist and thumb   Min Type Additional Details    [] Estim:  []Unatt       []IFC  []Premod                        []Other:  []w/ice   []w/heat  Position:  Location:    [] Estim: []Att    []TENS instruct  []NMES                    []Other:  []w/US   []w/ice   []w/heat  Position:  Location:    []  Traction: [] Cervical       []Lumbar                       [] Prone          []Supine                       []Intermittent   []Continuous Lbs:  [] before manual  [] after manual   8 [x]  Ultrasound: [x]Continuous   [] Pulsed                           []1MHz   [x]3MHz W/cm2:1.0  Location: right radial wrist and thumb    []  Iontophoresis with dexamethasone         Location: [] Take home patch   [] In clinic   15 []  Ice     [x]  Heat - fluidotherapy  []  Ice massage  []  Laser   []  Paraffin Position: AROM  Location: right wrist    []  Laser with stim  []  Other:  Position:  Location:    []  Vasopneumatic Device Pressure:       [] lo [] med [] hi   Temperature: [] lo [] med [] hi       [x] Skin assessment post-treatment:  [x]intact [x]redness- no adverse reaction []redness  adverse reaction:     27 min Therapeutic Exercise:  [x] See flow sheet :   Rationale: increase ROM and increase strength to improve the patients ability to improve use of right wrist for functional tasks. 10 min Manual Therapy:  IASTM using tools #4, 6 using sweeping and fanning techniques. Rationale: decrease pain, increase ROM, increase tissue extensibility and decrease edema  to dorsum right wrist and thumb. With   [] TE   [] TA   [] neuro   [] other: Patient Education: [x] Review HEP    [] Progressed/Changed HEP based on:   [] positioning   [] body mechanics   [] transfers   [] heat/ice application   [] Splint wear/care   [] Sensory re-education   [] scar management      [] other:            Other Objective/Functional Measures: Range of Motion:   Wrist Active/Passive ROM  Wrist 3/12/2020   Right/Left  3/18/2020   Right  3/23/2020  Right 3/25/2020   Right      Flex 62/69  71         Ext 53/57  54  58 62      UD 30/30           RD 12/16                            Strength:   Measurements: Taken with [employer] Dynamometer, in Lbs   Level 2 3/12/2020 3/25/2020 Date Date Date Date Date   Right 26  24             Left 35               Deficit                 Change                        Pain Level (0-10 scale) post treatment: 0/10    ASSESSMENT/Changes in Function: Improved wrist AROM. Continues to have pain with palpation to basal joint. Encouraged patient to discuss this with referring physician at her next appointment. Regressed with  strength however she reports improved functional use of right hand since beginning skilled OT. Provided and instructed in updated wrist PROM HEP. Patient will continue to benefit from skilled OT services to modify and progress therapeutic interventions, address ROM deficits, address strength deficits and instruct in home and community integration to attain remaining goals.      []  See Plan of Care  []  See progress note/recertification  []  See Discharge Summary         Progress towards goals / Updated goals:  Short Term Goals: To be accomplished in 2  weeks:  1. Patient will be compliant with initial home exercise program to take an active role in their rehabilitation process. Status at [facility]: gentle wrist AROM within pain tolerance  3/18/2020 - AROM improving  3/23/2020 - pt reports improved pain 0/10, goal met     2. Patient will demonstrate a good understanding of their condition and strategies for self-management. Status at [facility]: pt educated on activity modifications, splint wear, moist heat application, treatment plan  3/16/20 - pt reports not wearing splint because she isn't doing much  3/18/2020 - pt reports pain with cooking tasks, carrying pyrex pan  3/25/2020 - I wear the brace when I am doing stuff like vacuuming. Goal met     Long Term Goals: To be accomplished in 4 weeks:              1. Patient will have 55 degrees of right wrist extension in order to increase ease with ADL's such as bathing, eating and dressing and to eventually bear weight thru the right upper extremity as in pushing up from a chair. Status at [facility]: 53 degrees  3/18/2020 - pain with passive wrist extension stretches  3/23/2020 - 58 degrees, goal met     2. Patient will have 65 degrees of right wrist flexion in order to perform hygiene tasks and /or work with tools such as a screwdriver. Status at [facility]: 62 degrees  3/18/2020 - 71 degrees, goal met     3. Pt will have 30 pounds of  in the right hand to allow for functional grasp for all ADL activities including dressing, bathing and self care. Status at [facility]: 26#  3/25/2020 - 24#, regressed     4. Patient will show a 10 point improvement on FOTO functional status measure to improve overall functional performance.   Status at [facility]:44  3/25/2020 - pt reports improved functional use of right hand since beginning skilled OT, progressing    PLAN  []  Upgrade activities as tolerated     [x]  Continue plan of care  [] Update interventions per flow sheet       []  Discharge due to:_  []  Other:_      Kylah Junior OT 3/25/2020  9:57 AM    Future Appointments   Date Time Provider Rosette Freeman   3/25/2020 10:00 AM Jono Huynh OT [facility]   4/1/2020  9:40 AM Bradley Carrera MD [address] [place] [address]   5/4/2020 10:10 AM [place]rera [place] [id number] [place]

## 2020-03-26 ENCOUNTER — APPOINTMENT (OUTPATIENT)
Dept: PHYSICAL THERAPY | Age: 70
End: 2020-03-26
Payer: MEDICARE

## 2020-03-30 ENCOUNTER — APPOINTMENT (OUTPATIENT)
Dept: PHYSICAL THERAPY | Age: 70
End: 2020-03-30
Payer: MEDICARE

## 2020-04-01 ENCOUNTER — APPOINTMENT (OUTPATIENT)
Dept: PHYSICAL THERAPY | Age: 70
End: 2020-04-01

## 2020-04-01 ENCOUNTER — HOSPITAL ENCOUNTER (OUTPATIENT)
Dept: CT IMAGING | Age: 70
Discharge: HOME | End: 2020-04-01
Attending: FAMILY MEDICINE
Payer: MEDICARE

## 2020-04-01 DIAGNOSIS — J98.4 LUNG ABNORMALITY: ICD-10-CM

## 2020-04-01 PROCEDURE — 71250 CT THORAX DX C-: CPT

## 2020-04-06 ENCOUNTER — APPOINTMENT (OUTPATIENT)
Dept: PHYSICAL THERAPY | Age: 70
End: 2020-04-06

## 2020-04-08 ENCOUNTER — APPOINTMENT (OUTPATIENT)
Dept: PHYSICAL THERAPY | Age: 70
End: 2020-04-08

## 2020-04-13 ENCOUNTER — APPOINTMENT (OUTPATIENT)
Dept: PHYSICAL THERAPY | Age: 70
End: 2020-04-13

## 2020-04-15 ENCOUNTER — APPOINTMENT (OUTPATIENT)
Dept: PHYSICAL THERAPY | Age: 70
End: 2020-04-15

## 2020-05-19 NOTE — PROGRESS NOTES
[facility]  [address]  [place], [address]  [phone] fax    Occupational Therapy Discharge Summary    Patient name: Dave Tapia Start of Care:  3/12/2020   Referral source: Negar Reyes DO : 1950   Medical/Treatment Diagnosis: Right wrist pain [M25.531]  Payor: [place] MEDICARE / Plan: [place] MEDICARE PART A & B / Product Type: Medicare /  Onset Date:2019     Prior Hospitalization: see medical history Provider#: [id number]   Medications: Verified on Patient Summary List     Comorbidities: Thyroid problems, high blood pressure   Prior Level of Function: Independent with ADL/IADL tasks without functional limitations of right hand. Visits from Start of Care: 5    Missed Visits: 0  Reporting Period : 3/12/2020 to 3/25/2020    Summary of Care:  Short Term Goals: To be accomplished in 2  weeks:  1. Patient will be compliant with initial home exercise program to take an active role in their rehabilitation process. Status at Eval: gentle wrist AROM within pain tolerance  3/18/2020 - AROM improving  3/23/2020 - pt reports improved pain 0/10, goal met     2. Patient will demonstrate a good understanding of their condition and strategies for self-management. Status at Eval: pt educated on activity modifications, splint wear, moist heat application, treatment plan  3/16/20 - pt reports not wearing splint because she isn't doing much  3/18/2020 - pt reports pain with cooking tasks, carrying pyrex pan  3/25/2020 - I wear the brace when I am doing stuff like vacuuming. Goal met     Long Term Goals: To be accomplished in 4 weeks:              1. Patient will have 55 degrees of right wrist extension in order to increase ease with ADL's such as bathing, eating and dressing and to eventually bear weight thru the right upper extremity as in pushing up from a chair.   Status at Eval: 53 degrees  3/18/2020 - pain with passive wrist extension stretches  3/23/2020 - 58 degrees, goal met     2. Patient will have 65 degrees of right wrist flexion in order to perform hygiene tasks and /or work with tools such as a screwdriver. Status at Eval: 62 degrees  3/18/2020 - 71 degrees, goal met     3. Pt will have 30 pounds of  in the right hand to allow for functional grasp for all ADL activities including dressing, bathing and self care. Status at eval: 26#  3/25/2020 - 24#, regressed     4. Patient will show a 10 point improvement on FOTO functional status measure to improve overall functional performance. Status at Eval:44  3/25/2020 - pt reports improved functional use of right hand since beginning skilled OT, progressing    ASSESSMENT/RECOMMENDATIONS: Pt was placed on active hold due to COVID-19 pandemic. Followed up with patient via phone call check in and pt reported no difficulty with updated HEP. Pt not interested in continued skilled OT services via telehealth at this time. Will d/c at this time and reassess in the future if medically necessary.  Thank you for this referral.     [x]Discontinue therapy: [x]Patient has reached or is progressing toward set goals      []Patient is non-compliant or has abdicated      []Due to lack of appreciable progress towards set goals    Melanie Reyes OT 5/19/2020 11:58 AM

## 2020-05-21 ENCOUNTER — DOCUMENTATION ONLY (OUTPATIENT)
Dept: ORTHOPEDIC SURGERY | Age: 70
End: 2020-05-21

## 2020-05-21 NOTE — PROGRESS NOTES
Patient was scheduled for Right ring finger mucus cyst excision w/rotational skin flap on 3/17/2020, which was cancelled due to the ban on elective surgeries during the COVID 19 pandemic. Patient contacted on 5/19/20 to see if she would like to reschedule surgery, but she stated she did not want to reschedule at this time. Patient advised to call our office if she would like surgery in the future.

## 2020-06-08 ENCOUNTER — OFFICE VISIT (OUTPATIENT)
Dept: ORTHOPEDIC SURGERY | Age: 70
End: 2020-06-08

## 2020-06-08 VITALS — TEMPERATURE: 96.2 F | BODY MASS INDEX: 30.44 KG/M2 | WEIGHT: 165.4 LBS | HEIGHT: 62 IN

## 2020-06-08 DIAGNOSIS — M65.4 DE QUERVAIN'S TENOSYNOVITIS, RIGHT: Primary | ICD-10-CM

## 2020-06-08 NOTE — PROGRESS NOTES
1. Have you been to the ER, urgent care clinic since your last visit? Hospitalized since your last visit? No    2. Have you seen or consulted any other health care providers outside of the [address] [place] [address] since your last visit? Include any pap smears or colon screening.  No

## 2020-06-08 NOTE — PROGRESS NOTES
Humberto Acevedo is a 72 y.o. female right handed unknown employment. Worker's Compensation and legal considerations: not known. Vitals:    06/08/20 0913   Temp: (!) 96.2 °F (35.7 °C)   TempSrc: Oral   Weight: 165 lb 6.4 oz (75 kg)   Height: 5' 2\" (1.575 m)   PainSc:   1   PainLoc: Wrist           Chief Complaint   Patient presents with    Wrist Pain     Right wrist pain        HPI: Patient comes in today for follow-up regarding right wrist pain. She previously was treated for a nondisplaced distal radius fracture that has since healed. Additionally during that injury she had right ring finger swelling that she is continuing to have some pain and stiffness for. She complains of pain along the wrist on the thumb side of the wrist as well as some pain with the ring finger. Previous HPI: Patient comes in today for follow-up of her previous right distal radius fracture that is healed and she has been in therapy. She reports that therapy is going well however her main concern today is her ring finger mass. She reports this was excised and drained by 1 of her physicians in the office but it came back more painful. She reports any pain with range of motion. Previous HPI: Patient comes in today for right upper extremity EMG and nerve conduction study follow-up. She was previously diagnosed with a nondisplaced right distal radius fracture. She reports she is still having some pain in the wrist however it is improved. Previous HPI: Patient comes in today for MRI follow-up after having wrist pain on the right side since December. She has been in a thumb spica brace since her last visit and she reports Improvement in her symptoms. Initial HPI: Patient comes in today with complaints of right hand pain and wrist pain after a fall. She reports the pain is persisted in her wrist as well as some tenderness in her ring finger with range of motion.     Date of onset:  12/2019    Injury: Yes: Comment: fall    Prior Treatment:  No    Numbness/ Tingling: No    ROS: Review of Systems - General ROS: negative  Respiratory ROS: no cough, shortness of breath, or wheezing  Cardiovascular ROS: no chest pain or dyspnea on exertion  Musculoskeletal ROS: positive for - pain in finger - right and wrist - right  Neurological ROS: negative  Dermatological ROS: negative  Psychological ROS: negative  ENT ROS: negative  Allergy and Immunology ROS: negative  Hematological and Lymphatic ROS: negative  Gastrointestinal ROS: no abdominal pain, change in bowel habits, or black or bloody stools    Past Medical History:   Diagnosis Date    Acquired ventricular hypertrophy     Allergic rhinitis     Bladder incontinence     Cardiac echocardiogram 05/11/2016    On-Sight Ultrasound:  EF 48%. No RWMA on segments seen. Mild conc LVH. Gr 1 DDfx. Normal RVSP. Mild TR.  Cardiac nuclear imaging test, low risk 06/15/2016    Low risk. No ischemia or prior infarction. No RWMA. EF 71%. Neg EKG on maximal EST. Ex time 8 min 45 sec.  Cardiomyopathy ([address]     Cardiovascular disease     Esophageal reflux     Essential hypertension     Hyperlipidemia     Numerous moles     Sleep apnea     Thyroid disease     Vitamin D deficiency        Past Surgical History:   Procedure Laterality Date    APPENDECTOMY      HX CATARACT REMOVAL      HX LEEP PROCEDURE         Current Outpatient Medications   Medication Sig Dispense Refill    triamcinolone acetonide (KENALOG) 10 mg/mL injection 1 mL by Intra artICUlar route once for 1 dose. 1 Vial 0    telmisartan (MICARDIS) 40 mg tablet       mirabegron ER (MYRBETRIQ) 50 mg ER tablet Take 1 Tab by mouth daily. 90 Tab 3    cetirizine (ZYRTEC) 10 mg tablet       pitavastatin calcium (LIVALO) 2 mg tablet Take  by mouth daily.  pantoprazole (PROTONIX) 40 mg tablet Take  by mouth.       REFRESH TEARS 0.5 % drop ophthalmic solution       meloxicam (MOBIC) 7.5 mg tablet       REFRESH P.M. 57.3-42.5 % oint ophthalmic ointment       levothyroxine (SYNTHROID) 112 mcg tablet Take 150 mcg by mouth.  omega-3 fatty acids-vitamin e 1,000 mg cap Take 1 Cap by mouth two (2) times a day.  telmisartan-hydrochlorothiazide (MICARDIS HCT) 40-12.5 mg per tablet Take 1 Tab by mouth daily.  ergocalciferol (ERGOCALCIFEROL) 50,000 unit capsule Take 50,000 Units by mouth every seven (7) days. Allergies   Allergen Reactions    Septra [Sulfamethoprim] Other (comments)     Upset stomach, makes patient real sick         PE:     Physical Exam  Vitals signs and nursing note reviewed. Constitutional:       Appearance: Normal appearance. She is normal weight. Musculoskeletal: Normal range of motion. General: Swelling and tenderness present. No deformity or signs of injury. Right lower leg: No edema. Left lower leg: No edema. Neurological:      Mental Status: She is alert. Right Ring Finger there is minimal tenderness to palpation about the finger. Range of motion is full sensation is intact distally and capillary refill is brisk.:      Wrist: Tenderness is localized over the first dorsal compartment of the right wrist.     Tenderness L R Test L R   1st Ext Comp - + Finkelstein's - +   Snuff Box - - Melton - -   2nd Ext Comp - - S-L Shear - -   S-L Joint - - L-T Shear - -   L-T Joint - - DRUJ Sup - -   6th Ext Comp - - DRUJ Pro - -   Ulnar Snuff - - DRUJ Grind - -   Fovea - - TFCC - -   STT Joint - - Mid-Carp Inst - -   FCR - - P-T Grind - -   Intersection - - ECU Sublux.  - -      Dorsal Ganglion: -   Volar Ganglion: -      ROM: Full      NCV & EMG Findings:  All nerve conduction studies (as indicated in the following tables) were within normal limits.       All examined muscles (as indicated in the following table) showed no evidence of electrical instability     INTERPRETATION  This was a normal nerve conduction and EMG study showing there to be no signs of neuropathy, myopathy, or radiculopathy in the nerves and muscles tested.      CLINICAL INTERPRETATION  There are no electrodiagnostic findings correlating with her symptoms. Imaging:     3/2/2020 plain films of the right wrist again does not show any illicit fracture or dislocation. The nondisplaced fracture appears to be completely healed. 2/2020 MRI Right Wrist  IMPRESSION:  1. Subacute mildly comminuted nondisplaced intra-articular distal radial  metaphysis fracture. 2.  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 Small ganglion  cyst at the volar aspect of the triscaphe joint. 3.  Mild to moderate extensor carpi ulnaris tendinosis with suspected short  segment longitudinal split tear just distal to the ulnar styloid. Trace abductor  pollicis longus, extensor pollicis brevis, extensor carpi radialis  longus/brevis, and extensor pollicis longus tenosynovitis. 4.  Mild cystic change at the ulnar base of the lunate which can be seen with  ulnar impaction syndrome; however the TFCC is unremarkable in appearance. Plain films of the right wrist and hand does not show any acute fracture or dislocation. There are only mild degenerative changes noted. These plain films are dated 1/27/2020        ICD-10-CM ICD-9-CM    1. De Quervain's tenosynovitis, right M65.4 727.04 AMB SUPPLY ORDER      TRIAMCINOLONE ACETONIDE INJ      triamcinolone acetonide (KENALOG) 10 mg/mL injection      INJECT TENDON SHEATH/LIGAMENT       Plan:     Right first dorsal compartment injection    Right DQ brace to be worn at all times for 6 weeks. Follow-up and Dispositions    · Return in about 2 months (around 8/8/2020) for reevaluation and exercises.          Plan was reviewed with patient, who verbalized agreement and understanding of the plan    [place] [address] NOTE        Chart reviewed for the following:   IOtf DO, have reviewed the History, Physical and updated the Allergic reactions for [address] performed immediately prior to start of procedure:   Otf STEIN DO, have performed the following reviews on [address] [place] [address] prior to the start of the procedure:            * Patient was identified by name and date of birth   * Agreement on procedure being performed was verified  * Risks and Benefits explained to the patient  * Procedure site verified and marked as necessary  * Patient was positioned for comfort  * Consent was signed and verified     Time: 09:35 AM      Date of procedure: 6/8/2020    Procedure performed by: Jess Fields DO    Provider assisted by: [facility]    Patient assisted by: self    How tolerated by patient: tolerated the procedure well with no complications    Post Procedural Pain Scale: 0 - No Hurt    Comments: none    Procedure:  After consent was obtained, using sterile technique the tendon was prepped. Local anesthetic used: 1% lidocaine. Kenalog 5 mg and was then injected and the needle withdrawn. The procedure was well tolerated. The patient is asked to continue to rest the area for a few more days before resuming regular activities. It may be more painful for the first 1-2 days. Watch for fever, or increased swelling or persistent pain in the joint. Call or return to clinic prn if such symptoms occur or there is failure to improve as anticipated.

## 2020-06-15 ENCOUNTER — HOSPITAL ENCOUNTER (OUTPATIENT)
Dept: MAMMOGRAPHY | Age: 70
Discharge: HOME | End: 2020-06-15
Attending: FAMILY MEDICINE
Payer: MEDICARE

## 2020-06-15 DIAGNOSIS — Z12.31 VISIT FOR SCREENING MAMMOGRAM: ICD-10-CM

## 2020-06-15 PROCEDURE — 77063 BREAST TOMOSYNTHESIS BI: CPT

## 2020-08-03 ENCOUNTER — OFFICE VISIT (OUTPATIENT)
Dept: CARDIOLOGY CLINIC | Age: 70
End: 2020-08-03

## 2020-08-03 VITALS
HEIGHT: 62 IN | OXYGEN SATURATION: 98 % | SYSTOLIC BLOOD PRESSURE: 130 MMHG | BODY MASS INDEX: 30.73 KG/M2 | DIASTOLIC BLOOD PRESSURE: 82 MMHG | WEIGHT: 167 LBS | HEART RATE: 80 BPM

## 2020-08-03 DIAGNOSIS — Z82.49 FAMILY HISTORY OF EARLY CAD: ICD-10-CM

## 2020-08-03 DIAGNOSIS — I73.9 CLAUDICATION (HCC): ICD-10-CM

## 2020-08-03 DIAGNOSIS — E78.5 DYSLIPIDEMIA: ICD-10-CM

## 2020-08-03 DIAGNOSIS — I10 ESSENTIAL HYPERTENSION: Primary | ICD-10-CM

## 2020-08-03 RX ORDER — AA/PROT/LYSINE/METHIO/VIT C/B6 50-12.5 MG
TABLET ORAL
COMMUNITY
End: 2021-08-02

## 2020-08-03 RX ORDER — ASPIRIN 81 MG/1
TABLET ORAL DAILY
COMMUNITY

## 2020-08-03 NOTE — PROGRESS NOTES
HISTORY OF PRESENT ILLNESS  Jose Prater is a 72 y.o. female. Follow-up     Claudication         Patient presents for an overdue follow-up office visit. She has a past medical history significant for hypertension, dyslipidemia, and hypothyroidism. She reports a family history for coronary artery disease. She was also a smoker up until 2016. Patient underwent an exercise nuclear stress test in June 2016 at our office which was a normal study. Patient exercised for 8 minutes 45 seconds without EKG changes or symptoms. Her ejection fraction was normal.  There is no evidence of ischemia. More recently, she underwent an echocardiogram in September 2018 which was essentially normal.  EF 60%, no valvular heart disease with exception of mild aortic valve calcification without stenosis. She was last seen in the office approximately a year and a half ago. Since last visit, she has noted increased leg pain primarily below both knees which worsens after she goes walking. She states she is trying to walk every day for the whole month of July. The symptoms were not as noticeable when she is walking but she noticed them when she finished walking. She states that she was previously walking on a treadmill and did not have these symptoms. However, since the COVID-19 pandemic, she stopped going to the gym was trying to walk outside. She denies any exertional chest pain or shortness of breath. No leg swelling, no orthopnea, no PND. Past Medical History:   Diagnosis Date    Acquired ventricular hypertrophy     Allergic rhinitis     Bladder incontinence     Cardiac echocardiogram 05/11/2016    On-Sight Ultrasound:  EF 48%. No RWMA on segments seen. Mild conc LVH. Gr 1 DDfx. Normal RVSP. Mild TR.  Cardiac nuclear imaging test, low risk 06/15/2016    Low risk. No ischemia or prior infarction. No RWMA. EF 71%. Neg EKG on maximal EST. Ex time 8 min 45 sec.     Cardiomyopathy ([address]     Cardiovascular disease     Esophageal reflux     Essential hypertension     Hyperlipidemia     Numerous moles     Sleep apnea     Thyroid disease     Vitamin D deficiency       Current Outpatient Medications   Medication Sig Dispense Refill    aspirin delayed-release 81 mg tablet Take  by mouth daily.  coenzyme q10 (Co Q-10) 10 mg cap Take  by mouth.  mirabegron ER (Myrbetriq) 50 mg ER tablet Take 1 Tab by mouth daily. 90 Tab 1    telmisartan (MICARDIS) 40 mg tablet       cetirizine (ZYRTEC) 10 mg tablet       pitavastatin calcium (LIVALO) 2 mg tablet Take  by mouth daily.  pantoprazole (PROTONIX) 40 mg tablet Take  by mouth.  REFRESH TEARS 0.5 % drop ophthalmic solution       meloxicam (MOBIC) 7.5 mg tablet       levothyroxine (SYNTHROID) 112 mcg tablet Take 150 mcg by mouth.  omega-3 fatty acids-vitamin e 1,000 mg cap Take 1 Cap by mouth two (2) times a day.  ergocalciferol (ERGOCALCIFEROL) 50,000 unit capsule Take 50,000 Units by mouth every seven (7) days. Allergies   Allergen Reactions    Septra [Sulfamethoprim] Other (comments)     Upset stomach, makes patient real sick        Social History     Tobacco Use    Smoking status: Current Some Day Smoker    Smokeless tobacco: Never Used   Substance Use Topics    Alcohol use: No    Drug use: No            Review of Systems   Constitutional: Negative for chills, fever and weight loss. HENT: Negative for nosebleeds. Eyes: Negative for blurred vision and double vision. Respiratory: Negative for cough and wheezing. Cardiovascular: Positive for claudication. Negative for palpitations, orthopnea, leg swelling and PND. Gastrointestinal: Negative for heartburn, nausea and vomiting. Genitourinary: Negative for dysuria and hematuria. Musculoskeletal: Negative for falls and myalgias. Skin: Negative for rash. Neurological: Negative for dizziness and focal weakness.    Endo/Heme/Allergies: Does not bruise/bleed easily. Psychiatric/Behavioral: Negative for substance abuse. Visit Vitals  /82 (BP 1 Location: Left arm, BP Patient Position: Sitting)   Pulse 80   Ht 5' 2\" (1.575 m)   Wt 75.8 kg (167 lb)   SpO2 98%   BMI 30.54 kg/m²      Physical Exam   Constitutional: She is oriented to person, place, and time. She appears well-developed and well-nourished. HENT:   Head: Normocephalic and atraumatic. Eyes: Conjunctivae are normal.   Neck: Neck supple. No JVD present. Carotid bruit is not present. Cardiovascular: Normal rate, regular rhythm, S1 normal, S2 normal and normal pulses. Exam reveals no gallop. No murmur heard. Pulmonary/Chest: Effort normal and breath sounds normal. She has no wheezes. She has no rales. Abdominal: Soft. Bowel sounds are normal. There is no abdominal tenderness. Musculoskeletal:         General: No tenderness or edema. Neurological: She is alert and oriented to person, place, and time. Skin: Skin is warm and dry. Psychiatric: She has a normal mood and affect. Her behavior is normal. Thought content normal.     EKG: Normal sinus rhythm, normal axis, normal QTc interval, borderline low voltage, no ST or T wave abnormalities concerning for ischemia. Borderline poor R wave progression. Compared to the previous EKG, no significant interval change. ASSESSMENT and PLAN    Bilateral leg pain. This may represent claudication versus musculoskeletal pain. Given her prior smoking history, have recommended exercise ABIs to evaluate for any significant peripheral arterial disease. Hypertension. This remains well controlled on her current medical regimen which includes Micardis as monotherapy. Dyslipidemia. Patient is now taking Livalo 2 mg daily. This is followed by her PCP. History of tobacco abuse. Patient quit smoking in 2016. She is congratulated for this and reminded to remain tobacco free. Family history for CAD.   Because of this I recommended that she exercise regularly and continue to modify all of her risk factors. Patient can follow up annually, sooner if needed.

## 2020-08-03 NOTE — PATIENT INSTRUCTIONS
If you have not heard from the central scheduler to schedule your testing in 48 hours, please call [phone].

## 2020-08-03 NOTE — PROGRESS NOTES
Juan Miguel Hernandez presents today for   Chief Complaint   Patient presents with    Follow-up     overdue follow up    Claudication     stopped walking 4 to 5 nights ago       Juan Miguel Hernandez preferred language for health care discussion is english/other. Is someone accompanying this pt? no    Is the patient using any DME equipment during [address]? no    Depression Screening:  3 most recent PHQ Screens 8/3/2020   PHQ Not Done -   Little interest or pleasure in doing things Not at all   Feeling down, depressed, irritable, or hopeless Not at all   Total Score PHQ 2 0       Learning Assessment:  Learning Assessment 1/31/2018   PRIMARY LEARNER Patient   PRIMARY LANGUAGE ENGLISH   LEARNER PREFERENCE PRIMARY DEMONSTRATION   ANSWERED BY Patient   RELATIONSHIP SELF       Abuse Screening:  Abuse Screening Questionnaire 8/3/2020   Do you ever feel afraid of your partner? N   Are you in a relationship with someone who physically or mentally threatens you? N   Is it safe for you to go home? Y       Fall Risk  Fall Risk Assessment, last 12 mths 8/3/2020   Able to walk? Yes   Fall in past 12 months? No   Fall with injury? -   Number of falls in past 12 months -   Fall Risk Score -       Pt currently taking Anticoagulant therapy? No but is taking ASA 81 mg once a day    Coordination of Care:  1. Have you been to the ER, urgent care clinic since your last visit? Hospitalized since your last visit? In Dec 2019 for falling     2. Have you seen or consulted any other health care providers outside of the [address] [place] [address] since your last visit? Include any pap smears or colon screening.  no

## 2020-08-10 ENCOUNTER — OFFICE VISIT (OUTPATIENT)
Dept: ORTHOPEDIC SURGERY | Age: 70
End: 2020-08-10

## 2020-08-10 VITALS
SYSTOLIC BLOOD PRESSURE: 121 MMHG | WEIGHT: 160 LBS | TEMPERATURE: 98.6 F | DIASTOLIC BLOOD PRESSURE: 83 MMHG | HEART RATE: 95 BPM | OXYGEN SATURATION: 98 % | BODY MASS INDEX: 29.44 KG/M2 | HEIGHT: 62 IN

## 2020-08-10 DIAGNOSIS — M65.4 DE QUERVAIN'S TENOSYNOVITIS, RIGHT: Primary | ICD-10-CM

## 2020-08-10 RX ORDER — LEVOTHYROXINE SODIUM 150 MCG
150 TABLET ORAL DAILY
COMMUNITY
Start: 2020-07-23

## 2020-08-10 NOTE — PATIENT INSTRUCTIONS
Yoan Neely Disease: Exercises Introduction Here are some examples of exercises for you to try. The exercises may be suggested for a condition or for rehabilitation. Start each exercise slowly. Ease off the exercises if you start to have pain. You will be told when to start these exercises and which ones will work best for you. How to do the exercises Thumb lifts 1. Place your hand on a flat surface, with your palm up. 2. Lift your thumb away from your palm to make a \"C\" shape. 3. Hold for about 6 seconds. 4. Repeat 8 to 12 times. Passive thumb MP flexion 1. Hold your hand in front of you, and turn your hand so your little finger faces down and your thumb faces up. (Your hand should be in the position used for shaking someone's hand.) You may also rest your hand on a flat surface. 2. Use the fingers on your other hand to bend your thumb down at the point where your thumb connects to your palm. 3. Hold for at least 15 to 30 seconds. 4. Repeat 2 to 4 times. Finkelstein stretch 1. Hold your arms out in front of you. (Your hand should be in the position used for shaking someone's hand.) 2. Bend your thumb toward your palm. 3. Use your other hand to gently stretch your thumb and wrist downward until you feel the stretch on the thumb side of your wrist. 
4. Hold for at least 15 to 30 seconds. 5. Repeat 2 to 4 times. Resisted ulnar deviation For this exercise, you will need elastic exercise material, such as surgical tubing or Thera-Band. 1. Sit leaning forward with your legs slightly spread and your elbow on your thigh. 2. Grasp one end of the band with your palm down, and step on the other end with the foot opposite the hand holding the band. 3. Slowly bend your wrist sideways and away from your knee. 4. Repeat 8 to 12 times. Follow-up care is a key part of your treatment and safety.  Be sure to make and go to all appointments, and call your doctor if you are having problems. It's also a good idea to know your test results and keep a list of the medicines you take. Where can you learn more? Go to http://www.gray.com/ Enter Q174 in the search box to learn more about \"De Quervain's Disease: Exercises. \" Current as of: March 2, 2020               Content Version: 12.5 © 5359-4688 [employer]. Care instructions adapted under license by [employer] (which disclaims liability or warranty for this information). If you have questions about a medical condition or this instruction, always ask your healthcare professional. [place] [address] any warranty or liability for your use of this information.

## 2020-08-10 NOTE — PROGRESS NOTES
Hemal Lee is a 72 y.o. female right handed unknown employment. Worker's Compensation and legal considerations: not known. Vitals:    08/10/20 0902   BP: 121/83   Pulse: 95   Temp: 98.6 °F (37 °C)   TempSrc: Temporal   SpO2: 98%   Weight: 160 lb (72.6 kg)   Height: 5' 2\" (1.575 m)   PainSc:   0 - No pain   PainLoc: Wrist           Chief Complaint   Patient presents with    Wrist Pain     RIGHT       HPI: Patient comes in today for follow-up 2 months status post right de Quervain's injection. She reports overall her pain is improved however there is still some soreness to touch and pain with lifting. Previous HPI: Patient comes in today for follow-up regarding right wrist pain. She previously was treated for a nondisplaced distal radius fracture that has since healed. Additionally during that injury she had right ring finger swelling that she is continuing to have some pain and stiffness for. She complains of pain along the wrist on the thumb side of the wrist as well as some pain with the ring finger. Initial HPI: Patient comes in today with complaints of right hand pain and wrist pain after a fall. She reports the pain is persisted in her wrist as well as some tenderness in her ring finger with range of motion.     Date of onset:  12/2019    Injury: Yes: Comment: fall    Prior Treatment:  Yes: Comment: Right first dorsal compartment injection    Numbness/ Tingling: No    ROS: Review of Systems - General ROS: negative  Respiratory ROS: no cough, shortness of breath, or wheezing  Cardiovascular ROS: no chest pain or dyspnea on exertion  Musculoskeletal ROS: positive for - pain in finger - right and wrist - right  Neurological ROS: negative  Dermatological ROS: negative  Psychological ROS: negative  ENT ROS: negative  Allergy and Immunology ROS: negative  Hematological and Lymphatic ROS: negative  Gastrointestinal ROS: no abdominal pain, change in bowel habits, or black or bloody stools    Past Medical History:   Diagnosis Date    Acquired ventricular hypertrophy     Allergic rhinitis     Bladder incontinence     Cardiac echocardiogram 05/11/2016    On-Sight Ultrasound:  EF 48%. No RWMA on segments seen. Mild conc LVH. Gr 1 DDfx. Normal RVSP. Mild TR.  Cardiac nuclear imaging test, low risk 06/15/2016    Low risk. No ischemia or prior infarction. No RWMA. EF 71%. Neg EKG on maximal EST. Ex time 8 min 45 sec.  Cardiomyopathy ([address]     Cardiovascular disease     Esophageal reflux     Essential hypertension     Hyperlipidemia     Numerous moles     Sleep apnea     Thyroid disease     Vitamin D deficiency        Past Surgical History:   Procedure Laterality Date    APPENDECTOMY      HX CATARACT REMOVAL      HX LEEP PROCEDURE         Current Outpatient Medications   Medication Sig Dispense Refill    aspirin delayed-release 81 mg tablet Take  by mouth daily.  coenzyme q10 (Co Q-10) 10 mg cap Take  by mouth.  oxybutynin chloride XL (DITROPAN XL) 10 mg CR tablet Take 1 Tab by mouth daily. 30 Tab 3    mirabegron ER (Myrbetriq) 50 mg ER tablet Take 1 Tab by mouth daily. 90 Tab 1    telmisartan (MICARDIS) 40 mg tablet       cetirizine (ZYRTEC) 10 mg tablet       pitavastatin calcium (LIVALO) 2 mg tablet Take  by mouth daily.  pantoprazole (PROTONIX) 40 mg tablet Take  by mouth.  REFRESH TEARS 0.5 % drop ophthalmic solution       meloxicam (MOBIC) 7.5 mg tablet       omega-3 fatty acids-vitamin e 1,000 mg cap Take 1 Cap by mouth two (2) times a day.  ergocalciferol (ERGOCALCIFEROL) 50,000 unit capsule Take 50,000 Units by mouth every seven (7) days.  Synthroid 150 mcg tablet       levothyroxine (SYNTHROID) 112 mcg tablet Take 150 mcg by mouth. Allergies   Allergen Reactions    Septra [Sulfamethoprim] Other (comments)     Upset stomach, makes patient real sick         PE:     Physical Exam  Vitals signs and nursing note reviewed. Constitutional:       Appearance: Normal appearance. She is normal weight. Eyes:      Pupils: Pupils are equal, round, and reactive to light. Neck:      Musculoskeletal: Normal range of motion. Cardiovascular:      Pulses: Normal pulses. Pulmonary:      Effort: Pulmonary effort is normal. No respiratory distress. Abdominal:      General: Abdomen is flat. Musculoskeletal: Normal range of motion. General: No swelling, tenderness, deformity or signs of injury. Right lower leg: No edema. Left lower leg: No edema. Skin:     General: Skin is warm. Capillary Refill: Capillary refill takes less than 2 seconds. Neurological:      General: No focal deficit present. Mental Status: She is alert and oriented to person, place, and time. Psychiatric:         Mood and Affect: Mood normal.         Behavior: Behavior normal.           Wrist: Very minimal tenderness to palpation over the first dorsal compartment on the right. Tenderness L R Test L R   1st Ext Comp - - Finkelstein's - -   Snuff Box - - Melton - -   2nd Ext Comp - - S-L Shear - -   S-L Joint - - L-T Shear - -   L-T Joint - - DRUJ Sup - -   6th Ext Comp - - DRUJ Pro - -   Ulnar Snuff - - DRUJ Grind - -   Fovea - - TFCC - -   STT Joint - - Mid-Carp Inst - -   FCR - - P-T Grind - -   Intersection - - ECU Sublux. - -      Dorsal Ganglion: -   Volar Ganglion: -      ROM: Full      NCV & EMG Findings:  All nerve conduction studies (as indicated in the following tables) were within normal limits.       All examined muscles (as indicated in the following table) showed no evidence of electrical instability     INTERPRETATION  This was a normal nerve conduction and EMG study showing there to be no signs of neuropathy, myopathy, or radiculopathy in the nerves and muscles tested.      CLINICAL INTERPRETATION  There are no electrodiagnostic findings correlating with her symptoms.     Imaging:     3/2/2020 plain films of the right wrist again does not show any illicit fracture or dislocation. The nondisplaced fracture appears to be completely healed. 2/2020 MRI Right Wrist  IMPRESSION:  1. Subacute mildly comminuted nondisplaced intra-articular distal radial  metaphysis fracture. 2.  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 Small ganglion  cyst at the volar aspect of the triscaphe joint. 3.  Mild to moderate extensor carpi ulnaris tendinosis with suspected short  segment longitudinal split tear just distal to the ulnar styloid. Trace abductor  pollicis longus, extensor pollicis brevis, extensor carpi radialis  longus/brevis, and extensor pollicis longus tenosynovitis. 4.  Mild cystic change at the ulnar base of the lunate which can be seen with  ulnar impaction syndrome; however the TFCC is unremarkable in appearance. Plain films of the right wrist and hand does not show any acute fracture or dislocation. There are only mild degenerative changes noted. These plain films are dated 1/27/2020        ICD-10-CM ICD-9-CM    1. De Quervain's tenosynovitis, right  M65.4 727.04        Plan:     Exercises for right de Quervain's. I instructed the patient to do these for at least 2 months. Follow-up and Dispositions    · Return if symptoms worsen or fail to improve.          Plan was reviewed with patient, who verbalized agreement and understanding of the plan

## 2020-08-11 ENCOUNTER — HOSPITAL ENCOUNTER (OUTPATIENT)
Dept: VASCULAR SURGERY | Age: 70
Discharge: HOME | End: 2020-08-11
Attending: INTERNAL MEDICINE
Payer: MEDICARE

## 2020-08-11 DIAGNOSIS — I10 ESSENTIAL HYPERTENSION: ICD-10-CM

## 2020-08-11 LAB
LEFT ABI: 1.11
LEFT ANTERIOR TIBIAL: 147 MMHG
LEFT ARM BP: 151 MMHG
LEFT CALF PRESSURE: 157 MMHG
LEFT POSTERIOR TIBIAL: 168 MMHG
LEFT TBI: 0.86
LEFT TOE PRESSURE: 130 MMHG
RIGHT ABI: 1.05
RIGHT ANTERIOR TIBIAL: 149 MMHG
RIGHT ARM BP: 149 MMHG
RIGHT CALF PRESSURE: 151 MMHG
RIGHT POSTERIOR TIBIAL: 158 MMHG
RIGHT TBI: 1.25
RIGHT TOE PRESSURE: 189 MMHG

## 2020-08-11 PROCEDURE — 93923 UPR/LXTR ART STDY 3+ LVLS: CPT

## 2020-08-17 ENCOUNTER — TELEPHONE (OUTPATIENT)
Dept: CARDIOLOGY CLINIC | Age: 70
End: 2020-08-17

## 2020-08-17 NOTE — TELEPHONE ENCOUNTER
----- Message from Monster Dan MD sent at 8/17/2020 10:25 AM EDT ----- Please let the patient know that her ABIs were normal. 
----- Message ----- From: Chance Mast LPN Sent: 8/14/2020   1:50 PM EDT To: Monster Dan MD 
 
Per your last note\" Bilateral leg pain. This may represent claudication versus musculoskeletal pain. Given her prior smoking history, have recommended exercise ABIs to evaluate for any significant peripheral arterial disease. 
  
Hypertension. This remains well controlled on her current medical regimen which includes Micardis as monotherapy. 
  
Dyslipidemia. Patient is now taking Livalo 2 mg daily. This is followed by her PCP. 
  
History of tobacco abuse. Patient quit smoking in 2016. She is congratulated for this and reminded to remain tobacco free. 
  
Family history for CAD. Because of this I recommended that she exercise regularly and continue to modify all of her risk factors. 
  
Patient can follow up annually, sooner if needed.

## 2020-10-14 ENCOUNTER — HOSPITAL ENCOUNTER (OUTPATIENT)
Dept: GENERAL RADIOLOGY | Age: 70
Discharge: HOME | End: 2020-10-14
Payer: MEDICARE

## 2020-10-14 DIAGNOSIS — M25.551 RIGHT HIP PAIN: ICD-10-CM

## 2020-10-14 PROCEDURE — 73502 X-RAY EXAM HIP UNI 2-3 VIEWS: CPT

## 2020-10-30 ENCOUNTER — TRANSCRIBE ORDER (OUTPATIENT)
Dept: SCHEDULING | Age: 70
End: 2020-10-30

## 2020-10-30 DIAGNOSIS — M54.50 LUMBAGO: Primary | ICD-10-CM

## 2020-10-30 DIAGNOSIS — M51.36 DEGENERATION OF LUMBAR INTERVERTEBRAL DISC: ICD-10-CM

## 2020-11-04 ENCOUNTER — HOSPITAL ENCOUNTER (OUTPATIENT)
Dept: MRI IMAGING | Age: 70
Discharge: HOME | End: 2020-11-04
Attending: FAMILY MEDICINE
Payer: MEDICARE

## 2020-11-04 DIAGNOSIS — M54.50 LUMBAGO: ICD-10-CM

## 2020-11-04 DIAGNOSIS — M51.36 DEGENERATION OF LUMBAR INTERVERTEBRAL DISC: ICD-10-CM

## 2020-11-04 PROCEDURE — 72148 MRI LUMBAR SPINE W/O DYE: CPT

## 2020-11-05 ENCOUNTER — OFFICE VISIT (OUTPATIENT)
Dept: ORTHOPEDIC SURGERY | Age: 70
End: 2020-11-05
Payer: MEDICARE

## 2020-11-05 VITALS
HEART RATE: 97 BPM | TEMPERATURE: 98.6 F | SYSTOLIC BLOOD PRESSURE: 122 MMHG | HEIGHT: 62 IN | RESPIRATION RATE: 18 BRPM | BODY MASS INDEX: 29.26 KG/M2 | DIASTOLIC BLOOD PRESSURE: 84 MMHG

## 2020-11-05 DIAGNOSIS — M53.3 SACROILIAC JOINT DYSFUNCTION OF LEFT SIDE: ICD-10-CM

## 2020-11-05 DIAGNOSIS — M54.16 LUMBAR RADICULOPATHY: Primary | ICD-10-CM

## 2020-11-05 PROCEDURE — 1090F PRES/ABSN URINE INCON ASSESS: CPT | Performed by: PHYSICAL MEDICINE & REHABILITATION

## 2020-11-05 PROCEDURE — G9899 SCRN MAM PERF RSLTS DOC: HCPCS | Performed by: PHYSICAL MEDICINE & REHABILITATION

## 2020-11-05 PROCEDURE — G8754 DIAS BP LESS 90: HCPCS | Performed by: PHYSICAL MEDICINE & REHABILITATION

## 2020-11-05 PROCEDURE — 1101F PT FALLS ASSESS-DOCD LE1/YR: CPT | Performed by: PHYSICAL MEDICINE & REHABILITATION

## 2020-11-05 PROCEDURE — G8432 DEP SCR NOT DOC, RNG: HCPCS | Performed by: PHYSICAL MEDICINE & REHABILITATION

## 2020-11-05 PROCEDURE — 3017F COLORECTAL CA SCREEN DOC REV: CPT | Performed by: PHYSICAL MEDICINE & REHABILITATION

## 2020-11-05 PROCEDURE — G8399 PT W/DXA RESULTS DOCUMENT: HCPCS | Performed by: PHYSICAL MEDICINE & REHABILITATION

## 2020-11-05 PROCEDURE — 99214 OFFICE O/P EST MOD 30 MIN: CPT | Performed by: PHYSICAL MEDICINE & REHABILITATION

## 2020-11-05 PROCEDURE — G8536 NO DOC ELDER MAL SCRN: HCPCS | Performed by: PHYSICAL MEDICINE & REHABILITATION

## 2020-11-05 PROCEDURE — G8427 DOCREV CUR MEDS BY ELIG CLIN: HCPCS | Performed by: PHYSICAL MEDICINE & REHABILITATION

## 2020-11-05 PROCEDURE — G8752 SYS BP LESS 140: HCPCS | Performed by: PHYSICAL MEDICINE & REHABILITATION

## 2020-11-05 PROCEDURE — G8417 CALC BMI ABV UP PARAM F/U: HCPCS | Performed by: PHYSICAL MEDICINE & REHABILITATION

## 2020-11-05 RX ORDER — BISMUTH SUBSALICYLATE 262 MG
1 TABLET,CHEWABLE ORAL DAILY
COMMUNITY

## 2020-11-05 NOTE — PATIENT INSTRUCTIONS
Learning About Lumbar Epidural Steroid Injections What is a lumbar epidural steroid injection? A lumbar epidural injection is a shot into the epidural spacethe area in your back around the spinal cord. The shot may help reduce pain, tingling, or numbness in your back, buttock, or leg. The shot may have a steroid to reduce pain and swelling and a local anesthetic to numb nerves. How is a lumbar epidural steroid injection done? The doctor may use an imaging test before or during your injection. This can be an MRI, a CT scan, or an X-ray. These tests can show where your nerve problems are. After finding the right spot, the doctor may inject a numbing medicine into the skin where you will get the steroid injection. Then he or she puts the needle for the steroid into the numbed area. You may feel some pressure. You could feel some stinging or burning during the injection. How long does an epidural steroid injection take? It takes about 10 to 15 minutes to get this injection. You will probably go home about 20 to 30 minutes after you get it. What can you expect after a lumbar epidural steroid injection? If your injection had local anesthetic and a steroid, your legs may feel heavy or numb right after. You will probably be able to walk. But you may need to be extra careful. Take care not to lose your balance, and be sure to follow your doctor's instructions. If your injection contained local anesthetic, you may feel better right away. But this pain relief will last only a few hours. Your pain will probably return. This is because the steroids have not started working yet. Before the steroids start to work, your back may be sore for a few days. These injections don't always work. When they do, it takes 1 to 5 days. This pain relief can last for several days to a few months or longer. You may want to do less than normal for a few days. But you may also be able to return to your daily routine. Some people are dizzy or feel sick to their stomach after getting this injection. These symptoms usually don't last very long. If your pain is better, you may be able to keep doing your normal activities or physical therapy. But try not to overdo it, even if your back pain has improved a lot. If your pain is only a little better or if it comes back, your doctor may recommend another injection in a few weeks. If your pain has not changed, talk to your doctor about other treatment choices. Follow-up care is a key part of your treatment and safety. Be sure to make and go to all appointments, and call your doctor if you are having problems. It's also a good idea to know your test results and keep a list of the medicines you take. Where can you learn more? Go to http://www.resendiz.com/ Enter Nickiekamilla Bowers in the search box to learn more about \"Learning About Lumbar Epidural Steroid Injections. \" Current as of: March 2, 2020               Content Version: 12.6 © 2006-2020 [employer]. Care instructions adapted under license by [employer] (which disclaims liability or warranty for this information). If you have questions about a medical condition or this instruction, always ask your healthcare professional. [place] [address] any warranty or liability for your use of this information. Sacroiliac Joint Pain: Care Instructions Your Care Instructions The sacroiliac joints connect the spine and each side of the pelvis. These joints bear the weight and stress of your torso. This makes them easy to injure. Injury or overuse of these joints may cause low back pain. Stress on these joints can cause joint pain. Sacroiliac joint pain is more common in pregnant women. Certain kinds of arthritis also may cause this type of joint pain. Home treatment may help you feel better.  So can avoiding activities that stress your back. Your doctor also may recommend physical therapy. This may include doing exercises and stretches to help with pain. You may also learn to use good posture. Follow-up care is a key part of your treatment and safety. Be sure to make and go to all appointments, and call your doctor if you are having problems. It's also a good idea to know your test results and keep a list of the medicines you take. How can you care for yourself at home? · Ask your doctor about light exercises that may help your back pain. Try to do light activity throughout the day. But make sure to take rests as needed. Find a comfortable position for rest, but don't stay in one position for too long. Avoid activities that cause pain. · To apply heat, put a warm water bottle, a heating pad set on low, or a warm cloth on your back. Do not go to sleep with a heating pad on your skin. · Put ice or a cold pack on your back for 10 to 20 minutes at a time. Put a thin cloth between the ice and your skin. · If the doctor gave you a prescription medicine for pain, take it as prescribed. · If you are not taking a prescription pain medicine, ask your doctor if you can take an over-the-counter pain medicine, such as acetaminophen (Tylenol), ibuprofen (Advil, Motrin), or naproxen (Aleve). Read and follow all instructions on the label. Take pain medicines exactly as directed. · Do not take two or more pain medicines at the same time unless the doctor told you to. Many pain medicines have acetaminophen, which is Tylenol. Too much acetaminophen (Tylenol) can be harmful. · To prevent future back pain, do exercises to stretch and strengthen your back and stomach. Learn how to use good posture, safe lifting techniques, and proper body mechanics. When should you call for help? Call 911 anytime you think you may need emergency care. For example, call if: 
  · You are unable to move a leg at all. Call your doctor now or seek immediate medical care if: 
  · You have new or worse symptoms in your legs or buttocks. Symptoms may include: 
? Numbness or tingling. ? Weakness. ? Pain.  
  · You lose bladder or bowel control. Watch closely for changes in your health, and be sure to contact your doctor if: 
  · You are not getting better as expected. Where can you learn more? Go to http://michael-brook.info/ Enter T156 in the search box to learn more about \"Sacroiliac Joint Pain: Care Instructions. \" Current as of: March 2, 2020               Content Version: 12.6 © 8575-8067 [employer]. Care instructions adapted under license by [employer] (which disclaims liability or warranty for this information). If you have questions about a medical condition or this instruction, always ask your healthcare professional. [address] any warranty or liability for your use of this information. Sacroiliac Pain: Exercises Introduction Here are some examples of exercises for you to try. The exercises may be suggested for a condition or for rehabilitation. Start each exercise slowly. Ease off the exercises if you start to have pain. You will be told when to start these exercises and which ones will work best for you. How to do the exercises Knee-to-chest stretch 1. Do not do the knee-to-chest exercise if it causes or increases back or leg pain. 2. Lie on your back with your knees bent and your feet flat on the floor. You can put a small pillow under your head and neck if it is more comfortable. 3. Grasp your hands under one knee and bring the knee to your chest, keeping the other foot flat on the floor. 4. Keep your lower back pressed to the floor. Hold for at least 15 to 30 seconds. 5. Relax and lower the knee to the starting position. Repeat with the other leg. 6. Repeat 2 to 4 times with each leg. 7. To get more stretch, keep your other leg flat on the floor while pulling your knee to your chest.   
Bridging 1. Lie on your back with both knees bent. Your knees should be bent about 90 degrees. 2. Tighten your belly muscles by pulling in your belly button toward your spine. Then push your feet into the floor, squeeze your buttocks, and lift your hips off the floor until your shoulders, hips, and knees are all in a straight line. 3. Hold for about 6 seconds as you continue to breathe normally, and then slowly lower your hips back down to the floor and rest for up to 10 seconds. 4. Repeat 8 to 12 times. Hip extension 1. Get down on your hands and knees on the floor. 2. Keeping your back and neck straight, lift one leg straight out behind you. When you lift your leg, keep your hips level. Don't let your back twist, and don't let your hip drop toward the floor. 3. Hold for 6 seconds. Repeat 8 to 12 times with each leg. 4. If you feel steady and strong when you do this exercise, you can make it more difficult. To do this, when you lift your leg, also lift the opposite arm straight out in front of you. For example, lift the left leg and the right arm at the same time. (This is sometimes called the \"bird dog exercise. \") Hold for 6 seconds, and repeat 8 to 12 times on each side. Clamshell 1. Lie on your side with a pillow under your head. Keep your feet and knees together and your knees bent. 2. Raise your top knee, but keep your feet together. Do not let your hips roll back. Your legs should open up like a clamshell. 3. Hold for 6 seconds. 4. Slowly lower your knee back down. Rest for 10 seconds. 5. Repeat 8 to 12 times. 6. Switch to your other side and repeat steps 1 through 5. Hamstring wall stretch 1. Lie on your back in a doorway, with one leg through the open door. 2. Slide your affected leg up the wall to straighten your knee.  You should feel a gentle stretch down the back of your leg. 3. Hold the stretch for at least 1 minute to begin. Then try to lengthen the time you hold the stretch to as long as 6 minutes. 4. Switch legs, and repeat steps 1 through 3. 
5. Repeat 2 to 4 times. 6. If you do not have a place to do this exercise in a doorway, there is another way to do it: 
7. Lie on your back, and bend one knee. 8. Loop a towel under the ball and toes of that foot, and hold the ends of the towel in your hands. 9. Straighten your knee, and slowly pull back on the towel. You should feel a gentle stretch down the back of your leg. 10. Switch legs, and repeat steps 1 through 3. 
11. Repeat 2 to 4 times. 1. Do not arch your back. 2. Do not bend either knee. 3. Keep one heel touching the floor and the other heel touching the wall. Do not point your toes. Lower abdominal strengthening 1. Lie on your back with your knees bent and your feet flat on the floor. 2. Tighten your belly muscles by pulling your belly button in toward your spine. 3. Lift one foot off the floor and bring your knee toward your chest, so that your knee is straight above your hip and your leg is bent like the letter \"L. \" 
4. Lift the other knee up to the same position. 5. Lower one leg at a time to the starting position. 6. Keep alternating legs until you have lifted each leg 8 to 12 times. 7. Be sure to keep your belly muscles tight and your back still as you are moving your legs. Be sure to breathe normally. Piriformis stretch 1. Lie on your back with your legs straight. 2. Lift your affected leg, and bend your knee. With your opposite hand, reach across your body, and then gently pull your knee toward your opposite shoulder. 3. Hold the stretch for 15 to 30 seconds. 4. Switch legs and repeat steps 1 through 3. 
5. Repeat 2 to 4 times. Follow-up care is a key part of your treatment and safety.  Be sure to make and go to all appointments, and call your doctor if you are having problems. It's also a good idea to know your test results and keep a list of the medicines you take. Where can you learn more? Go to http://www.gray.com/ Enter T483 in the search box to learn more about \"Sacroiliac Pain: Exercises. \" Current as of: March 2, 2020               Content Version: 12.6 © 2006-2020 [employer]. Care instructions adapted under license by [employer] (which disclaims liability or warranty for this information). If you have questions about a medical condition or this instruction, always ask your healthcare professional. [address] any warranty or liability for your use of this information.

## 2020-11-05 NOTE — PROGRESS NOTES
[address]  [address], [address]  [place], [address] [place] [address] Street  Phone: [phone]  Fax: [phone]        Shan Landers  : 1950  PCP: Martha Plummer MD  2020    NEW PATIENT      HISTORY OF PRESENT ILLNESS  Jose C Hobbs is a 72 y.o. female c/o low back pain bilaterally, but more localized towards the left SI joint and buttock x 3 years. Her pain seems to flare up every summer. She has tried Flexeril and OTC NSAIDs without benefit. She also c/o pain and bruising near her sacrum. She was previously seen in 2017 by Dr. Florencia Carolina and she was given left iliolumbar trigger point injections without benefit. She was also given Gabapentin. She attended PT () with benefit with US and laser therapy. She failed to return for follow-up. Lumbar spine MRI dated 2020 reviewed. Per report, Slightly larger disc protrusion at L4-5 which causes relative mild spinal canal narrowing. Protrusion contacts but does not clearly compress the crossing L5 nerve roots. Anomalous appearing left L5-S1 facet in conjunction with a disc osteophyte complex causing severe left foraminal stenosis with compression of the partially conjoined and late exiting L5 nerve root, stable. Stable moderate right foraminal stenosis at L4-5. Stable mild to moderate right foraminal stenosis at L5-S1. Pain Score: 7/10. Treatments patient has tried:  Physical therapy:Yes -   Doing HEP: Unknown  Non-opioid medications: Yes  Spinal injections: No  Spinal surgery- No.   Last Lumbar MRI     PmHx: Cardiomyopathy, hypothyroidism    ASSESSMENT  This is a 72year-old female with chronic low back pain radiating into the bilateral buttocks (L>R). Her symptoms may be due to a left L5 radiculopathy vs left SI joint dysfunction. She had 3/5 positive left SI joint tests. PLAN  1. Left L5 TFESI. 2. We may consider treatment of the left SI joint if she does not find relief with TFESI.     Pt will f/u in 2-4 weeks after injection or sooner if needed. Diagnoses and all orders for this visit:    1. Lumbar radiculopathy  -     SCHEDULE SURGERY    2. Sacroiliac joint dysfunction of left side          CHIEF COMPLAINT  Naty Moncada is seen today in consultation at the request of Funmilayo Winkler MD for complaints of low back and left buttock pain. PAST MEDICAL HISTORY   Past Medical History:   Diagnosis Date    Acquired ventricular hypertrophy     Allergic rhinitis     Bladder incontinence     Cardiac echocardiogram 05/11/2016    On-Sight Ultrasound:  EF 48%. No RWMA on segments seen. Mild conc LVH. Gr 1 DDfx. Normal RVSP. Mild TR.  Cardiac nuclear imaging test, low risk 06/15/2016    Low risk. No ischemia or prior infarction. No RWMA. EF 71%. Neg EKG on maximal EST. Ex time 8 min 45 sec.  Cardiomyopathy ([address]     Cardiovascular disease     Esophageal reflux     Essential hypertension     Hyperlipidemia     Numerous moles     Sleep apnea     Thyroid disease     Vitamin D deficiency        Past Surgical History:   Procedure Laterality Date    APPENDECTOMY      HX CATARACT REMOVAL      HX LEEP PROCEDURE         MEDICATIONS      Current Outpatient Medications   Medication Sig Dispense Refill    BIOTIN PO Take  by mouth.  multivitamin (ONE A DAY) tablet Take 1 Tab by mouth daily.  mirabegron ER (Myrbetriq) 50 mg ER tablet Take 1 Tab by mouth daily. 90 Tab 2    Synthroid 150 mcg tablet       aspirin delayed-release 81 mg tablet Take  by mouth daily.  telmisartan (MICARDIS) 40 mg tablet       cetirizine (ZYRTEC) 10 mg tablet       pitavastatin calcium (LIVALO) 2 mg tablet Take  by mouth daily.  pantoprazole (PROTONIX) 40 mg tablet Take  by mouth.  REFRESH TEARS 0.5 % drop ophthalmic solution       meloxicam (MOBIC) 7.5 mg tablet       levothyroxine (SYNTHROID) 112 mcg tablet Take 150 mcg by mouth.       omega-3 fatty acids-vitamin e 1,000 mg cap Take 1 Cap by mouth two (2) times a day.  ergocalciferol (ERGOCALCIFEROL) 50,000 unit capsule Take 50,000 Units by mouth every seven (7) days.  coenzyme q10 (Co Q-10) 10 mg cap Take  by mouth. ALLERGIES    Allergies   Allergen Reactions    Septra [Sulfamethoprim] Other (comments)     Upset stomach, makes patient real sick          SOCIAL HISTORY    Social History     Socioeconomic History    Marital status:      Spouse name: Not on file    Number of children: Not on file    Years of education: Not on file    Highest education level: Not on file   Tobacco Use    Smoking status: Current Some Day Smoker    Smokeless tobacco: Never Used   Substance and Sexual Activity    Alcohol use: No    Drug use: No       FAMILY HISTORY    Family History   Problem Relation Age of Onset    Heart Attack Brother     High Cholesterol Father     Arthritis-osteo Father     Breast Cancer Sister          REVIEW OF SYSTEMS  Review of Systems   Constitutional: Negative for chills, fever and weight loss. Respiratory: Negative for shortness of breath. Cardiovascular: Negative for chest pain. Gastrointestinal: Negative for constipation. Negative for fecal incontinence    Genitourinary: Negative for dysuria. Negative for urinary incontinence   Musculoskeletal: Positive for back pain. Left buttock pain   Skin: Negative for rash. Neurological: Negative for dizziness, tingling, tremors, focal weakness and headaches. Endo/Heme/Allergies: Does not bruise/bleed easily. Psychiatric/Behavioral: The patient does not have insomnia.           PHYSICAL EXAMINATION  Visit Vitals  /84   Pulse 97   Temp 98.6 °F (37 °C) (Temporal)   Resp 18   Ht 5' 2\" (1.575 m)   BMI 29.26 kg/m²         Pain Assessment  8/10/2020   Location of Pain Wrist   Location Modifiers Right   Severity of Pain 0   Quality of Pain -   Quality of Pain Comment -   Duration of Pain -   Frequency of Pain Intermittent   Aggravating Factors -   Aggravating Factors Comment -   Limiting Behavior Some   Relieving Factors -   Relieving Factors Comment -   Result of Injury Yes   Work-Related Injury No   Type of Injury Fall         Constitutional:  Well developed, well nourished, in no acute distress. Psychiatric: Affect and mood are appropriate. HEENT: Normocephalic, atraumatic. Extraocular movements intact. Integumentary: No rashes or abrasions noted on exposed areas. Cardiovascular: Regular rate and rhythm. Pulmonary: Clear to auscultation bilaterally. SPINE/MUSCULOSKELETAL EXAM    Lumbar spine:  No rash, ecchymosis, or gross obliquity. No fasciculations. No focal atrophy is noted. No pain with hip ROM. Full range of motion. Mild tenderness to palpation. No tenderness to palpation at the sciatic notch. SI joint tender on the left. Trochanters non tender. Sensation in the bilateral legs grossly intact to light touch. LEFT RIGHT   CHADD TEST + -   P4 TEST + -   COMPRESSION TEST - -   DISTRACTION TEST - -   GAENSLEN'S TEST + -           MOTOR:      Biceps  Triceps Deltoids Wrist Ext Wrist Flex Hand Intrin   Right 5/5 5/5 5/5 5/5 5/5 5/5   Left 5/5 5/5 5/5 5/5 5/5 5/5             Hip Flex  Quads Hamstrings Ankle DF EHL Ankle PF   Right 5/5 5/5 5/5 5/5 5/5 5/5   Left 5/5 5/5 5/5 5/5 5/5 5/5     DTRs are 2+ biceps, triceps, brachioradialis, patella, and Achilles. Negative Straight Leg raise. Squat not tested. No difficulty with tandem gait. Ambulation without assistive device. FWB. RADIOGRAPHS/DATA  Lumbar MRI images taken on 11/4/2020 personally reviewed with patient:  Straightening of the lordosis, stable. No significant  spondylolisthesis. Normal vertebral body heights. Stable hemangioma in the L2 vertebral body. The conus medullaris is normal in signal and caliber ending at the upper L1 level.  Paraspinal soft tissues are unremarkable.     Spinal canal and neural foramen:      T12-L1: Disc is unremarkable. No spinal canal or foraminal stenosis.     L1-2: Minimal disc bulge. No spinal canal or foraminal stenosis.     L2-3: Mild disc bulge. No spinal canal stenosis but there is trace narrowing of the neural foramen.     L3-4: Mild disc bulge and facet DJD. No spinal canal stenosis but there is trace/mild foraminal narrowing, stable.     L4-5: Slightly larger central disc protrusion which again contacts but does not clearly distort or compress the crossing L5 nerve roots. Mild right greater than left facet DJD. Relative mild spinal canal narrowing. Nonstenotic left neural  foramen. Moderate right foraminal stenosis, stable. There is contact of the  right L4 nerve root.     L5-S1: Mild disc bulge and right facet hypertrophy. 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 Partially conjoined left L5 and S1 nerve roots and  right S1 and S2 nerve roots. Similar appearing left foraminal stenosis with disc  osteophyte compressing the late exiting L5 nerve root. Early exiting left S1  nerve root at the lateral recess without definite compression. Stable mild to  moderate right foraminal stenosis. No central spinal canal narrowing.     IMPRESSION  IMPRESSION:     1.  Slightly larger disc protrusion at L4-5 which causes relative mild spinal  canal narrowing. Protrusion contacts but does not clearly compress the crossing  L5 nerve roots. 2.  Anomalous appearing left L5-S1 facet in conjunction with a disc osteophyte  complex causing severe left foraminal stenosis with compression of the partially  conjoined and late exiting L5 nerve root, stable.   3.  Stable moderate right foraminal stenosis at L4-5.  4.  Stable mild to moderate right foraminal stenosis at L5-S1.    RUE EMG 2/28/2020:  NCV & EMG Findings:  All nerve conduction studies (as indicated in the following tables) were within normal limits.       All examined muscles (as indicated in the following table) showed no evidence of electrical instability     INTERPRETATION  This was a normal nerve conduction and EMG study showing there to be no signs of neuropathy, myopathy, or radiculopathy in the nerves and muscles tested.  reviewed    Ms. Casie Black has a reminder for a \"due or due soon\" health maintenance. I have asked that she contact her primary care provider for follow-up on this health maintenance. 20 minutes of face-to-face contact were spent with the patient during today's visit extensively discussing symptoms and treatment plan. All questions were answered. More than half of this visit today was spent on counseling. Written by Tania Schrader, as dictated by Dr. Brennon Bejarano. I, Dr. Brennon Bejarano, confirm that all documentation is accurate.

## 2020-11-24 ENCOUNTER — HOSPITAL ENCOUNTER (OUTPATIENT)
Dept: PHYSICAL THERAPY | Age: 70
Discharge: HOME | End: 2020-11-24
Payer: MEDICARE

## 2020-11-24 PROCEDURE — 97110 THERAPEUTIC EXERCISES: CPT

## 2020-11-24 PROCEDURE — 97161 PT EVAL LOW COMPLEX 20 MIN: CPT

## 2020-11-24 PROCEDURE — 97162 PT EVAL MOD COMPLEX 30 MIN: CPT

## 2020-11-24 PROCEDURE — 97530 THERAPEUTIC ACTIVITIES: CPT

## 2020-11-24 NOTE — PROGRESS NOTES
[facility] [id number] [facility], [address] [place] [address], [facility] [address]  [phone] fax      Plan of Care/ Statement of Necessity for Physical Therapy Services    Patient name: Tanner Pemberton Start of Care: 2020   Referral source: Marely Reilly MD : 1950    Medical Diagnosis: Low back pain [M54.5]  Payor: Nancy Coffee / Plan: [place] MEDICARE PART A & B / Product Type: Medicare /  Onset Date:2020    Treatment Diagnosis: left LBP   Prior Hospitalization: see medical history Provider#: [id number]   Medications: Verified on Patient summary List    Comorbidities: HTN, weight change up recently   Prior Level of Function: I all areas of activities and ADLs, no AD use, gardened. Drove, tolerated household and community activiites, walked 2 miles a day      The Plan of Care and following information is based on the information from the initial evaluation. Assessment/ key information: 71 YO female diagnosed as above and with S/S consistent with above diagnosis presents to skilled outpatient PT. CCO Left LBP and pain in the buttock and describes as insideous onset in 2020. She notes a 3 year history of recurrent episodic pain similar to this that she presents with. today her pain is 5/10 and she has pain in the left superior glut and buttock. She has LOM at the trunk, + piriformis and sciatic nerve tension, decrease core strength and decrease tolerance to ADLs and activities. She has decrease mobility left inonimant, Patient demonstrates the potential to make gains with improved ROM, strength, endurance/activity tolerance, functional FOTO survey score  and all within a reasonable time frame so as to increase their functional independence with ADLs and activities for carryover to  Improved quality of life, tolerance to household and community activities and caring for her  and sons.  Patient requires skilled Physical Therapy so as to monitor their response to and modify their treatment plan accordingly. Patient appears to be an appropriate candidate for skilled outpatient Physical Therapy. Evaluation Complexity History MEDIUM  Complexity : 1-2 comorbidities / personal factors will impact the outcome/ POC ; Examination MEDIUM Complexity : 3 Standardized tests and measures addressing body structure, function, activity limitation and / or participation in recreation  ;Presentation MEDIUM Complexity : Evolving with changing characteristics  ; Clinical Decision Making MEDIUM Complexity : FOTO score of 26-74  Overall Complexity Rating: MEDIUM  Problem List: pain affecting function, decrease ROM, decrease strength, impaired gait/ balance, decrease ADL/ functional abilitiies, decrease activity tolerance, decrease flexibility/ joint mobility, decrease transfer abilities and other FOTO 40   Treatment Plan may include any combination of the following: Therapeutic exercise, Therapeutic activities, Neuromuscular re-education, Physical agent/modality, Manual therapy, Patient education, Self Care training, Home safety training and Stair training  Patient / Family readiness to learn indicated by: asking questions, trying to perform skills and interest  Persons(s) to be included in education: patient (P)  Barriers to Learning/Limitations: None  Patient Goal (s): to make this pain disappear  Patient Self Reported Health Status: good  Rehabilitation Potential: good    Short Term Goals: To be accomplished in 5 treatments:   1 patient will have established and be I with HEP to aid with self management at discharge   EVAL issued   CURRENT   2 patient will have pain 4/10 to aid with increase tolerance for vacuuming the floor   EVAL 5/10, and reports difficulty vacuuming     Long Term Goals:  To be accomplished in 12 treatments:   1 patient will have pain 1-2/10 to aid with increase tolerance for making th bed   EVAL 5, needs assist from neighbor to make the bed   CURRENT   2 patient will have FOTO 58 to show significant, projected gains with usual work and housework   EVAL 40   CURRENT   3 patient will have overall reports of improvement at 50% to aid with increased ease in caring for her  and sons   EVAL pain affecting her ability to care for her family   CURRENT  4 patient will tolerate TM 1/4 mile and no significant increase pain for carryover to her walking program   EVAL increase pain with walking   CURRENT      Frequency / Duration: Patient to be seen 2-3 times per week for 12 treatments. Patient/ Caregiver education and instruction: Diagnosis, prognosis, self care, activity modification and exercises   [x]  Plan of care has been reviewed with PTA    Certification Period: 11/24/20- 12/23/20  Kinsey Bazzi, PT 11/24/2020 11:41 AM    ________________________________________________________________________    I certify that the above Therapy Services are being furnished while the patient is under my care. I agree with the treatment plan and certify that this therapy is necessary.     Physician's Signature:____________Date:_________TIME:________    [employer], Date and Time must be completed for valid certification **    Please sign and return to In . [address], [address] [place] [address], [address] [place] [address],[facility] [address]  [phone] fax

## 2020-11-24 NOTE — PROGRESS NOTES
PT DAILY TREATMENT NOTE/LUMBAR EVAL     Patient Name: Samantha Crouch  Date:2020  : 1950  [x]  Patient  Verified  Payor: VA MEDICARE / Plan: VA MEDICARE PART A & B / Product Type: Medicare /    In time:1120  Out time:1155  Total Treatment Time (min): 35  Visit #: 1 of 12    Medicare/BCBS Only   Total Timed Codes (min):  23 1:1 Treatment Time:  23     Treatment Area: Low back pain [M54.5]  SUBJECTIVE  Pain Level (0-10 scale): 5  [x]constant []intermittent []improving [x]worsening []no change since onset  WORSE sitting, rising from sitting, BETTER ice, advil , aleve, supine  Any medication changes, allergies to medications, adverse drug reactions, diagnosis change, or new procedure performed?: [x] No    [] Yes (see summary sheet for update)  Subjective functional status/changes:     PLOF: I all areas of activities and ADLs, no AD use, gardened. Drove, tolerated household and community activiites, walked 2 miles a day  Limitations to PLOF: pain  Mechanism of Injury: 2020, insidious onset  Current symptoms/Complaints: 71 YO female diagnosed as above and with S/S consistent with above diagnosis presents to skilled outpatient PT. CCO Left LBP and pain in the buttock and describes as insideous onset in 2020. She notes a 3 year history of recurrent episodic pain similar to this that she presents with.    Previous Treatment/Compliance: MD, MRI, epidural last week, ice, advil, aleve  PMHx/Surgical Hx: HTN, weight change up recently  Work Hx: retired  Living Situation: house, not alone  and sons  Pt Goals: to make this pain disappear  Barriers: []pain []financial []time []transportation []other  Motivation: good  Substance use: []Alcohol []Tobacco []other:   FABQ Score: []low []elevate  Cognition: A & O x 4   Other:    OBJECTIVE/EXAMINATION  Domestic Life: household and community activities, care giver for  and sons  Activity/Recreational Limitations: pain   Mobility: I, No AD use, slow pace, antalgic  Self Care: I           12 min [x]Eval                  []Re-Eval       15 min Therapeutic Exercise:  [x] See flow sheet :   Rationale: increase ROM, increase strength and improve coordination to improve the patients ability to tolerate ADLs and activities    8 min Therapeutic Activity:  [x]  See flow sheet :   Rationale: increase ROM, increase strength and improve coordination  to improve the patients ability to tolerate ADLs and activities, self care               With   [x] TE   [x] TA   [] neuro   [] other: Patient Education: [x] Review HEP    [] Progressed/Changed HEP based on:   [x] positioning   [] body mechanics   [] transfers   [] heat/ice application    [] other:      Other Objective/Functional Measures:      Physical Therapy Evaluation - Lumbar Spine ([facility])    SUBJECTIVE  Chief Complaint:as above    Mechanism of injury:as above    Symptoms:  Aggravated by:   [] Bending [] Sitting [] Standing [] Walking   [] Moving [] Cough [] Sneeze [] Valsalva   [] AM  [] PM  Lying:  [] sup   [] pro   [] sidelying   [] Other:     Eased by:    [] Bending [] Sitting [] Standing [] Walking   [] Moving [] AM  [] PM  Lying: [] sup  [] pro  [] sidelying   [] Other:     General Health:  Red Flags Indicated? [] Yes    [] No  [] Yes [] No Recent weight change (If yes, due to dieting?  [] Yes  [] No)   [] Yes [] No Weakness in legs during walking  [] Yes [] No Unremitting pain at night  [] Yes [] No Abdominal pain or problems  [] Yes [] No Rectal bleeding  [] Yes [] No Feet more cold or painful in cold weather  [] Yes [] No Menstrual irregularities  [] Yes [] No Blood or pain with urination  [] Yes [] No Dysfunction of bowel or bladder  [] Yes [] No Recent illness within past 3 weeks (i.e, cold, flu)  [] Yes [] No Numbness/tingling in buttock/genitalia region    Past History/Treatments:     Diagnostic Tests: [] Lab work [] X-rays    [] CT [x] MRI     [] Other:  Results:    Functional Status  Prior level of function: as above  Present functional limitations:FOTO  What position do you sleep in?:supine, SL    OBJECTIVE  Posture:mild FH, RS  Lateral Shift: [] R    [] L     [] +  [x] -  Kyphosis: [] Increased [] Decreased   []  WNL  Lordosis:  [] Increased [x] Decreased   [] WNL  Pelvic symmetry: [] WNL    [] Other:decrease inonimant mobility left     Gait:  [] Normal     [x] Abnormal:decrease pace, antalgic, decrease stance left    Active Movements: [] N/A   [] Too acute   [] Other:  ROM   AROM % PROM Comments:pain, area   Forward flexion 40-60 26cm  Pain   Extension 20-30 17 degrees  Pain   SB right 20-30 48cm  Pain   SB left 20-30 53cm   Pain   Rotation right 5-10 75%  Pain   Rotation left 5-10 75%  Pain             Dural Mobility:  SLR Sitting: [] R    [] L    [] +    [] -  @ (degrees):           Supine: [] R    [] L    [] +    [] -  @ (degrees):   Slump Test: [] R    [x] L    [x] +    [] -  @ (degrees):   Prone Knee Bend: [] R    [] L    [] +    [] -     Palpation  [x] Min  [] Mod  [] Severe    Location:TTP right Piriformis  [] Min  [x] Mod  [x] Severe    Location:TTP left SIJ and superior glut  [] Min  [] Mod  [] Severe    Location:     Special Tests       Sacroilliac:  Gaenslen's: [] R    [] L    [] +    [] -     Compression: [] +    [] -     Gapping:  [] +    [] -     Thigh Thrust: [] R    [] L    [] +    [] -     Leg Length: [] +    [] -   Position:    Crests:    ASIS:    PSIS:    Sacral Sulcus:    Mobility: Standing flex:     Sitting flex:     Supine to sit:     Prone knee bend:         Hip: Gamble Schooner:  [] R    [] L    [] +    [] -     Scour:  [] R    [] L    [] +    [] -     Piriformis: [x] R    [x] L    [x] +    [] -  Left > right        Deficits: Boris's: [] R    [] L    [] +    [] -     Atha Anger: [] R    [] L    [] +    [] -     Hamstrings 90/90:    Gastrocsoleus (to neutral): Right: Left:       Global Muscular Weakness:  Abdominals:X  Quadratus Lumborum:X  Paraspinals:   Other:    Other tests/comments:       Pain Level (0-10 scale) post treatment: 5    ASSESSMENT/Changes in Function: Patient demonstrates the potential to make gains with improved ROM, strength, endurance/activity tolerance, functional FOTO survey score  and all within a reasonable time frame so as to increase their functional independence with ADLs and activities for carryover to  Improved quality of life, tolerance to household and community activities and caring for her  and sons. Patient requires skilled Physical Therapy so as to monitor their response to and modify their treatment plan accordingly. Patient appears to be an appropriate candidate for skilled outpatient Physical Therapy. Patient will continue to benefit from skilled PT services to modify and progress therapeutic interventions, address ROM deficits, address strength deficits, analyze and address soft tissue restrictions, analyze and cue movement patterns, analyze and modify body mechanics/ergonomics, assess and modify postural abnormalities and instruct in home and community integration to attain remaining goals.      [x]  See Plan of Care  []  See progress note/recertification  []  See Discharge Summary         Progress towards goals / Updated goals:       PLAN  [x]  Upgrade activities as tolerated     [x]  Continue plan of care  []  Update interventions per flow sheet       []  Discharge due to:_  []  Other:_      Johanne Casillas, PT 11/24/2020  11:23 AM

## 2020-11-30 ENCOUNTER — HOSPITAL ENCOUNTER (OUTPATIENT)
Dept: PHYSICAL THERAPY | Age: 70
Discharge: HOME | End: 2020-11-30
Payer: MEDICARE

## 2020-11-30 PROCEDURE — 97110 THERAPEUTIC EXERCISES: CPT

## 2020-11-30 PROCEDURE — 97140 MANUAL THERAPY 1/> REGIONS: CPT

## 2020-11-30 PROCEDURE — 97014 ELECTRIC STIMULATION THERAPY: CPT

## 2020-11-30 NOTE — PROGRESS NOTES
PT DAILY TREATMENT NOTE 11    Patient Name: Jae Pate  Date:2020  : 1950  [x]  Patient  Verified  Payor: [place] MEDICARE / Plan: [place] MEDICARE PART A & B / Product Type: Medicare /    In time: 9:49   Out time:10:37  Total Treatment Time (min): 48  Visit #: 2 of 12    Medicare/BCBS Only   Total Timed Codes (min):  38 1:1 Treatment Time:  35       Treatment Area: Low back pain [M54.5]    SUBJECTIVE  Pain Level (0-10 scale): 2-3  Any medication changes, allergies to medications, adverse drug reactions, diagnosis change, or new procedure performed?: [x] No    [] Yes (see summary sheet for update)  Subjective functional status/changes:   [] No changes reported  Pt reports compliance to HEP but states she has some pain with HEP exercises. OBJECTIVE    Modality rationale: decrease pain and increase tissue extensibility to improve the patients ability to tolerate ambulation.     Min Type Additional Details   8 (and 2 min set up) [x] Estim:  [x]Unatt       []IFC  [x]Premod                        [x]Other:  [x]w/ice   []w/heat  Position: semi-reclined   Location: left low back    [] Estim: []Att    []TENS instruct  []NMES                    []Other:  []w/US   []w/ice   []w/heat  Position:  Location:    []  Traction: [] Cervical       []Lumbar                       [] Prone          []Supine                       []Intermittent   []Continuous Lbs:  [] before manual  [] after manual    []  Ultrasound: []Continuous   [] Pulsed                           []1MHz   []3MHz W/cm2:  Location:    []  Iontophoresis with dexamethasone         Location: [] Take home patch   [] In clinic    []  Ice     []  heat  []  Ice massage  []  Laser   []  Anodyne Position:  Location:    []  Laser with stim  []  Other:  Position:  Location:    []  Vasopneumatic Device Pressure:       [] lo [] med [] hi   Temperature: [] lo [] med [] hi   [] Skin assessment post-treatment:  []intact []redness- no adverse reaction    []redness  adverse reaction:     30 min Therapeutic Exercise:  [x] See flow sheet :   Rationale: increase ROM and increase strength to improve the patients ability to tolerate ADLs    8 min Manual Therapy: in supine: MET to correct left innominate rotation, shotgun technique    The manual therapy interventions were performed at a separate and distinct time from the therapeutic activities interventions. Rationale: decrease pain, increase ROM and increase tissue extensibility to improve activity tolerance. With   [] TE   [] TA   [] neuro   [] other: Patient Education: [x] Review HEP    [] Progressed/Changed HEP based on:   [] positioning   [] body mechanics   [] transfers   [] heat/ice application    [] other:      Other Objective/Functional Measures: Initiated exercises/interventions in flow sheet. Improvement in pelvic alignment noted post manual interventions. Challenged with left>right SLR today. Tenderness noted to the left SIJ reported with manual interventions and reported having a spasm in the right hip flexor as well. Pain Level (0-10 scale) post treatment: 0    ASSESSMENT/Changes in Function: Reported improvement in pain post session today after premod with CP. Educated pt on talking to her MD about a home TENS unit (pt asked about this in the clinic today). Pt reported having pain in her left low back and soreness with hipx3 exercise. Continue POC as tolerated. Patient will continue to benefit from skilled PT services to modify and progress therapeutic interventions, address functional mobility deficits, address ROM deficits, address strength deficits, analyze and address soft tissue restrictions, analyze and cue movement patterns, analyze and modify body mechanics/ergonomics, assess and modify postural abnormalities, address imbalance/dizziness and instruct in home and community integration to attain remaining goals.      []  See Plan of Care  []  See progress note/recertification  []  See Discharge Summary         Progress towards goals / Updated goals:  Short Term Goals: To be accomplished in 5 treatments:              1 patient will have established and be I with HEP to aid with self management at discharge              EVAL issued              CURRENT reports compliance 11/30/2020              2 patient will have pain 4/10 to aid with increase tolerance for vacuuming the floor              EVAL 5/10, and reports difficulty vacuuming                Long Term Goals:  To be accomplished in 12 treatments:              1 patient will have pain 1-2/10 to aid with increase tolerance for making th bed              EVAL 5, needs assist from neighbor to make the bed              CURRENT              2 patient will have FOTO 58 to show significant, projected gains with usual work and housework              EVAL 40              CURRENT              3 patient will have overall reports of improvement at 50% to aid with increased ease in caring for her  and sons              EVAL pain affecting her ability to care for her family              CURRENT  4 patient will tolerate TM 1/4 mile and no significant increase pain for carryover to her walking program              EVAL increase pain with walking              CURRENT    PLAN  [x]  Upgrade activities as tolerated     [x]  Continue plan of care  [x]  Update interventions per flow sheet       []  Discharge due to:_  []  Other:_      Zenon Levy, PT 11/30/2020  9:55 AM    Future Appointments   Date Time Provider Rosette Freeman   12/2/2020  9:00 AM Salvador Gauze, PTA [facility]   12/4/2020  9:45 AM Carlos Hill [facility]   12/7/2020 10:30 AM Clayjonathan Gauze, PTA [facility]   12/9/2020  9:45 AM Velvet Meter, PT [facility]   12/11/2020  2:15 PM Velvet Meter, PT [facility]   12/14/2020  9:45 AM Salvador Larsenuze, PTA [facility]   12/15/2020 11:15 AM Fish Reese MD [facility]   12/16/2020  9:45 AM Michelle Mcdowell, PTA [facility] 12/18/2020  9:45 AM Yolanda Conklin PTA [facility]   8/2/2021  9:20 AM Scott Novoa MD [facility]

## 2020-12-02 ENCOUNTER — HOSPITAL ENCOUNTER (OUTPATIENT)
Dept: PHYSICAL THERAPY | Age: 70
Discharge: HOME | End: 2020-12-02
Payer: MEDICARE

## 2020-12-02 PROCEDURE — 97140 MANUAL THERAPY 1/> REGIONS: CPT

## 2020-12-02 PROCEDURE — 97110 THERAPEUTIC EXERCISES: CPT

## 2020-12-02 NOTE — PROGRESS NOTES
PT DAILY TREATMENT NOTE 11    Patient Name: Major Gao  Date:2020  : 1950  [x]  Patient  Verified  Payor: [place] MEDICARE / Plan: [place] MEDICARE PART A & B / Product Type: Medicare /    In time: 9:19 Out time:9:56  Total Treatment Time (min): 33  Visit #: 3 of 12    Medicare/BCBS Only   Total Timed Codes (min):  33 1:1 Treatment Time:  33       Treatment Area: Low back pain [M54.5]    SUBJECTIVE  Pain Level (0-10 scale): 4-5  Any medication changes, allergies to medications, adverse drug reactions, diagnosis change, or new procedure performed?: [x] No    [] Yes (see summary sheet for update)  Subjective functional status/changes:   [] No changes reported  \"I vacuumed on yesterday. \"    OBJECTIVE    Modality rationale: decrease edema, decrease inflammation, decrease pain and increase tissue extensibility to improve the patients ability to perform ADL    Min Type Additional Details    [] Estim:  []Unatt       []IFC  []Premod                        []Other:  []w/ice   []w/heat  Position:  Location:    [] Estim: []Att    []TENS instruct  []NMES                    []Other:  []w/US   []w/ice   []w/heat  Position:  Location:    []  Traction: [] Cervical       []Lumbar                       [] Prone          []Supine                       []Intermittent   []Continuous Lbs:  [] before manual  [] after manual    []  Ultrasound: []Continuous   [] Pulsed                           []1MHz   []3MHz W/cm2:  Location:    []  Iontophoresis with dexamethasone         Location: [] Take home patch   [] In clinic    []  Ice     []  heat  []  Ice massage  []  Laser   []  Anodyne Position:  Location:    []  Laser with stim  []  Other:  Position:  Location:    []  Vasopneumatic Device Pressure:       [] lo [] med [] hi   Temperature: [] lo [] med [] hi   [x] Skin assessment post-treatment:  [x]intact []redness- no adverse reaction    []redness  adverse reaction:      min []Eval                  []Re-Eval       23 min Therapeutic Exercise:  [x] See flow sheet :   Rationale: increase ROM and increase strength to improve the patients ability to perform ADL      min Therapeutic Activity:  []  See flow sheet :   Rationale: increase ROM, increase strength and improve coordination  to improve the patients ability to perform ADL       min Neuromuscular Re-education:  []  See flow sheet :   Rationale: increase ROM, increase strength, improve coordination, improve balance and increase proprioception  to improve the patients ability to perform ADL     10 min Manual Therapy:  Checked alignment left innominate posterior rotated  P/A mob 3-4 along spine   The manual therapy interventions were performed at a separate and distinct time from the therapeutic activities interventions. Rationale: decrease pain, increase ROM, increase tissue extensibility, decrease edema , decrease trigger points and increase postural awareness to perform ADL      min Gait Training:  ___ feet with ___ device on level surfaces with ___ level of assist   Rationale: With   [x] TE   [] TA   [] neuro   [] other: Patient Education: [x] Review HEP    [] Progressed/Changed HEP based on:   [] positioning   [] body mechanics   [] transfers   [] heat/ice application    [] other:      Other Objective/Functional Measures:      Pain Level (0-10 scale) post treatment: 2    ASSESSMENT/Changes in Function: Pt demos tightness along TSP/LSP and right innominate posterior rotated during manual.  Pt demos tightness at (B) HS with HS stretch. Pt completed each stretching and strengthening there ex fairly well with cues.     Patient will continue to benefit from skilled PT services to address functional mobility deficits, address ROM deficits, address strength deficits, analyze and address soft tissue restrictions, analyze and cue movement patterns, analyze and modify body mechanics/ergonomics, assess and modify postural abnormalities and instruct in home and community integration to attain remaining goals.      [x]  See Plan of Care  []  See progress note/recertification  []  See Discharge Summary         Progress towards goals / Updated goals:       9 patient will have established and be I with HEP to aid with self management at discharge              EVAL issued              NII reports compliance 11/30/2020              2 patient will have pain 4/10 to aid with increase tolerance for vacuuming the floor              EVAL 5/10, and reports difficulty vacuuming              Current  4  12/2  [address] be accomplished in 12 treatments:              1 patient will have pain 1-2/10 to aid with increase tolerance for making th bed              EVAL 5, needs assist from neighbor to make the bed              CURRENT              2 patient will have FOTO 58 to show significant, projected gains with usual work and housework              EVAL 40              CURRENT              3 patient will have overall reports of improvement at 50% to aid with increased ease in caring for her  and sons              EVAL pain affecting her ability to care for her family              CURRENT  4 patient will tolerate TM 1/4 mile and no significant increase pain for carryover to her walking program              EVAL increase pain with walking              CURRENT       PLAN  [x]  Upgrade activities as tolerated     []  Continue plan of care  []  Update interventions per flow sheet       []  Discharge due to:_  []  Other:_      Michelle Mcdowell PTA 12/2/2020  9:33 AM    Future Appointments   Date Time Provider Rosette Freeman   12/4/2020  9:45 AM Mayamber Seymour [facility]   12/7/2020 10:30 AM Luis Patient, PTA [facility]   12/9/2020  9:45 AM [facility]andy Munira, PT [facility]   12/11/2020  2:15 PM Kandy Munira, PT [facility]   12/14/2020  9:45 AM Luis Patient, PTA [facility]   12/15/2020 11:15 AM Peterson Ty MD [facility]   12/16/2020  9:45 AM Jeremias [address] [facility]   12/18/2020  9:45 AM Honey Nations PTA [facility]   8/2/2021  9:20 AM Ashwin Degroot MD [facility] AM[facility]

## 2020-12-04 ENCOUNTER — HOSPITAL ENCOUNTER (OUTPATIENT)
Dept: PHYSICAL THERAPY | Age: 70
Discharge: HOME | End: 2020-12-04
Payer: MEDICARE

## 2020-12-04 PROCEDURE — 97112 NEUROMUSCULAR REEDUCATION: CPT

## 2020-12-04 PROCEDURE — 97110 THERAPEUTIC EXERCISES: CPT

## 2020-12-04 PROCEDURE — 97014 ELECTRIC STIMULATION THERAPY: CPT

## 2020-12-04 PROCEDURE — 97140 MANUAL THERAPY 1/> REGIONS: CPT

## 2020-12-04 NOTE — PROGRESS NOTES
PT DAILY TREATMENT NOTE 11    Patient Name: Jose C Hobbs  Date:2020  : 1950  [x]  Patient  Verified  Payor: [place] MEDICARE / Plan: [place] MEDICARE PART A & B / Product Type: Medicare /    In time: 9:42  Out time:10:45  Total Treatment Time (min): 63  Visit #: 4 of 12    Medicare/BCBS Only   Total Timed Codes (min):  48 1:1 Treatment Time:  48       Treatment Area: Low back pain [M54.5]    SUBJECTIVE  Pain Level (0-10 scale): 3-4  Any medication changes, allergies to medications, adverse drug reactions, diagnosis change, or new procedure performed?: [x] No    [] Yes (see summary sheet for update)  Subjective functional status/changes:   [] No changes reported  \"Last time she pulled on my leg because she said one was shorter, it really helped. \"    OBJECTIVE    Modality rationale: decrease inflammation, decrease pain and increase tissue extensibility to improve the patients ability to  tolerate exercises and return to daily activity with ease.    Min Type Additional Details   15 [x] Estim:  [x]Unatt       []IFC  [x]Premod                        []Other:  [x]w/ice   []w/heat  Position: prone  Location: left thoracic and lumbar    [] Estim: []Att    []TENS instruct  []NMES                    []Other:  []w/US   []w/ice   []w/heat  Position:  Location:    []  Traction: [] Cervical       []Lumbar                       [] Prone          []Supine                       []Intermittent   []Continuous Lbs:  [] before manual  [] after manual    []  Ultrasound: []Continuous   [] Pulsed                           []1MHz   []3MHz W/cm2:  Location:    []  Iontophoresis with dexamethasone         Location: [] Take home patch   [] In clinic    []  Ice     []  heat  []  Ice massage  []  Laser   []  Anodyne Position:  Location:    []  Laser with stim  []  Other:  Position:  Location:    []  Vasopneumatic Device Pressure:       [] lo [] med [] hi   Temperature: [] lo [] med [] hi   [] Skin assessment post-treatment:  []intact []redness- no adverse reaction    []redness  adverse reaction:     18 min Therapeutic Exercise:  [x] See flow sheet :   Rationale: increase ROM and increase strength to improve the patients ability to perform ADLs with ease    16 min Neuromuscular Re-education:  [x]  See flow sheet :   Rationale: improve coordination, improve balance and increase proprioception  to improve the patients ability to stabilize, use proper body mechanics, and promote proper postural awareness    14 min Manual Therapy: left innominate posteriorly rotated  P/A mob 3-4 along spine; MET to correct left innominate rotation, shotgun technique, STM to left thoracic paraspinals   The manual therapy interventions were performed at a separate and distinct time from the therapeutic activities interventions. Rationale: decrease pain, increase ROM, increase tissue extensibility, decrease trigger points and increase postural awareness to to increase mobility and reduce restriction with ADLs          With   [x] TE   [] TA   [x] neuro   [] other: Patient Education: [x] Review HEP    [] Progressed/Changed HEP based on:   [x] positioning   [x] body mechanics   [] transfers   [] heat/ice application    [] other:      Other Objective/Functional Measures:   Had pt perform standing back bend exercise 10x between each other exercise  VC for correct form with hip 3 way; pt tends to compensate with lateral trunk shifting    Pain Level (0-10 scale) post treatment: 0    ASSESSMENT/Changes in Function: Patient is tolerating exercises well, however does express some increase in symptoms with trunk flexion, however standing extension and squatting position eases her discomfort. Patient responded well the manual therapy; TTP in left thoracic and sacral regions d/t tightness.     Patient will continue to benefit from skilled PT services to modify and progress therapeutic interventions, address functional mobility deficits, address ROM deficits, address strength deficits, analyze and address soft tissue restrictions, analyze and cue movement patterns, analyze and modify body mechanics/ergonomics and assess and modify postural abnormalities to attain remaining goals.      [x]  See Plan of Care  []  See progress note/recertification  []  See Discharge Summary         Progress towards goals / Updated goals:     1 patient will have established and be I with HEP to aid with self management at discharge              EVAL issued              CURRENT reports compliance 11/30/2020              2 patient will have pain 4/10 to aid with increase tolerance for vacuuming the floor              EVAL 5/10, and reports difficulty vacuuming              Current  4  12/2  [address] be accomplished in 12 treatments:              1 patient will have pain 1-2/10 to aid with increase tolerance for making th bed              EVAL 5, needs assist from neighbor to make the bed              CURRENT              2 patient will have FOTO 58 to show significant, projected gains with usual work and housework              EVAL 40              CURRENT              3 patient will have overall reports of improvement at 50% to aid with increased ease in caring for her  and sons              EVAL pain affecting her ability to care for her family              CURRENT  4 patient will tolerate TM 1/4 mile and no significant increase pain for carryover to her walking program              EVAL increase pain with walking              CURRENT    PLAN  [x]  Upgrade activities as tolerated     [x]  Continue plan of care  []  Update interventions per flow sheet       []  Discharge due to:_  []  Other:_      BRADEN Garnica 12/4/2020  9:46 AM    Future Appointments   Date Time Provider Rosette Freeman   12/7/2020 10:30 AM Saira Millan PTA [facility]   12/9/2020  9:45 AM Keenan Perkins, PT [facility]   12/11/2020  2:15 PM Keenan Perkins, PT [facility]   12/14/2020  9:45 AM [facility]   12/15/2020 11:15 AM Shannan Acevedo MD [facility] AMB   12/16/2020  9:45 AM Tamiko Tejada PTA [facility]   12/18/2020  9:45 AM Tamiko Tejada PTA [facility]   8/2/2021  9:20 AM Daniol Gallego MD [facility] BS AMB

## 2020-12-07 ENCOUNTER — HOSPITAL ENCOUNTER (OUTPATIENT)
Dept: PHYSICAL THERAPY | Age: 70
Discharge: HOME | End: 2020-12-07
Payer: MEDICARE

## 2020-12-07 PROCEDURE — 97110 THERAPEUTIC EXERCISES: CPT

## 2020-12-07 PROCEDURE — 97140 MANUAL THERAPY 1/> REGIONS: CPT

## 2020-12-07 NOTE — PROGRESS NOTES
PT DAILY TREATMENT NOTE     Patient Name: Robson Munroe  Date:2020  : 1950  [x]  Patient  Verified  Payor: [place] MEDICARE / Plan: [place] MEDICARE PART A & B / Product Type: Medicare /    In time:10:37  Out time:11:30  Total Treatment Time (min): 50  Visit #: 5 of 12    Medicare/BCBS Only   Total Timed Codes (min):  40 1:1 Treatment Time:  40       Treatment Area: Low back pain [M54.5]    SUBJECTIVE  Pain Level (0-10 scale): 1  Any medication changes, allergies to medications, adverse drug reactions, diagnosis change, or new procedure performed?: [x] No    [] Yes (see summary sheet for update)  Subjective functional status/changes:   [] No changes reported  \"Im feeling good today. \"    OBJECTIVE    Modality rationale: decrease edema, decrease inflammation, decrease pain, increase tissue extensibility and increase muscle contraction/control to improve the patients ability to perform ADL    Min Type Additional Details    [] Estim:  []Unatt       []IFC  []Premod                        []Other:  []w/ice   []w/heat  Position:  Location:    [] Estim: []Att    []TENS instruct  []NMES                    []Other:  []w/US   []w/ice   []w/heat  Position:  Location:    []  Traction: [] Cervical       []Lumbar                       [] Prone          []Supine                       []Intermittent   []Continuous Lbs:  [] before manual  [] after manual    []  Ultrasound: []Continuous   [] Pulsed                           []1MHz   []3MHz W/cm2:  Location:    []  Iontophoresis with dexamethasone         Location: [] Take home patch   [] In clinic   10 [x]  Ice     []  heat  []  Ice massage  []  Laser   []  Anodyne Position:prone  Location:(B) TSP left LSP    []  Laser with stim  []  Other:  Position:  Location:    []  Vasopneumatic Device Pressure:       [] lo [] med [] hi   Temperature: [] lo [] med [] hi   [x] Skin assessment post-treatment:  [x]intact []redness- no adverse reaction    []redness  adverse reaction: min []Eval                  []Re-Eval       30 min Therapeutic Exercise:  [x] See flow sheet :   Rationale: increase ROM and increase strength to improve the patients ability to perform ADL     min Therapeutic Activity:  []  See flow sheet :   Rationale: increase ROM, increase strength and improve coordination  to improve the patients ability to perform ADL      min Neuromuscular Re-education:  []  See flow sheet :   Rationale: increase ROM, increase strength, improve coordination, improve balance and increase proprioception  to improve the patients ability to perform ADL     10 min Manual Therapy:  Checked alighnement/left leg distraction/P/A mob 3-4 TSP/LSP   The manual therapy interventions were performed at a separate and distinct time from the therapeutic activities interventions. Rationale: decrease pain, increase ROM, increase tissue extensibility, decrease edema , decrease trigger points and increase postural awareness to perform ADL     min Gait Training:  ___ feet with ___ device on level surfaces with ___ level of assist   Rationale: With   [x] TE   [] TA   [] neuro   [] other: Patient Education: [x] Review HEP    [] Progressed/Changed HEP based on:   [] positioning   [] body mechanics   [] transfers   [] heat/ice application    [] other:      Other Objective/Functional Measures: left innominate posterior rotated     Pain Level (0-10 scale) post treatment: 1    ASSESSMENT/Changes in Function: Pt experienced minimal discomfort with left leg distraction. Pt also presents with minimal increased pain on bike today Pt exhibits tightness along spine today. Overall pt responded fairly well to each strengthening flexibility there ex. Following there ex pt continued with minimal residual pain.     Patient will continue to benefit from skilled PT services to address functional mobility deficits, address ROM deficits, address strength deficits, analyze and address soft tissue restrictions, analyze and cue movement patterns, analyze and modify body mechanics/ergonomics, assess and modify postural abnormalities and instruct in home and community integration to attain remaining goals.      [x]  See Plan of Care  []  See progress note/recertification  []  See Discharge Summary         Progress towards goals / Updated goals:   1 patient will have established and be I with HEP to aid with self management at discharge              EVAL issued              CURRENT reports compliance 11/30/2020              2 patient will have pain 4/10 to aid with increase tolerance for vacuuming the floor              EVAL 5/10, and reports difficulty vacuuming              NDDWRBD  4  12/2                Long Term Goals: To be accomplished in 12 treatments:              1 patient will have pain 1-2/10 to aid with increase tolerance for making th bed              EVAL 5, needs assist from neighbor to make the bed              CURRENT              2 patient will have FOTO 58 to show significant, projected gains with usual work and housework              EVAL 40              CURRENT              3 patient will have overall reports of improvement at 50% to aid with increased ease in caring for her  and sons              EVAL pain affecting her ability to care for her family              CURRENT  4 patient will tolerate TM 1/4 mile and no significant increase pain for carryover to her walking program              EVAL increase pain with walking              CURRENT    PLAN  []  Upgrade activities as tolerated     []  Continue plan of care  []  Update interventions per flow sheet       []  Discharge due to:_  []  Other:_      Michelle Mcdowell PTA 12/7/2020  10:42 AM    Future Appointments   Date Time Provider Rosette Freeman   12/9/2020  9:45 AM Alexandra Vargas PT [facility]   12/11/2020  2:15 PM Alexandra Vargas PT [facility]   12/14/2020  9:45 AM Taina Rose PTA [facility]   12/15/2020 11:15 AM Osmar Dave MD [address] [place] [address] [facility] AMB   12/16/2020  9:45 AM Arya Warren PTA [facility]   12/18/2020  9:45 AM Arya Warren PTA [facility]   8/2/2021  9:20 AM Alejandro Bowers MD [facility] BS AMB

## 2020-12-09 ENCOUNTER — HOSPITAL ENCOUNTER (OUTPATIENT)
Dept: PHYSICAL THERAPY | Age: 70
Discharge: HOME | End: 2020-12-09
Payer: MEDICARE

## 2020-12-09 PROCEDURE — 97110 THERAPEUTIC EXERCISES: CPT

## 2020-12-09 PROCEDURE — 97140 MANUAL THERAPY 1/> REGIONS: CPT

## 2020-12-09 PROCEDURE — 97530 THERAPEUTIC ACTIVITIES: CPT

## 2020-12-09 NOTE — PROGRESS NOTES
PT DAILY TREATMENT NOTE     Patient Name: Lena Pruitt  Date:2020  : 1950  [x]  Patient  Verified  Payor: [place] MEDICARE / Plan: [place] MEDICARE PART A & B / Product Type: Medicare /    In time:950  Out time:1040  Total Treatment Time (min): 50  Visit #: 6 of 12    Medicare/BCBS Only   Total Timed Codes (min):  40 1:1 Treatment Time:  40       Treatment Area: Low back pain [M54.5]    SUBJECTIVE  Pain Level (0-10 scale): 2-3  Any medication changes, allergies to medications, adverse drug reactions, diagnosis change, or new procedure performed?: [x] No    [] Yes (see summary sheet for update)  Subjective functional status/changes:   [] No changes reported  \"It's the weather - it really bothers my shoulder- makes it ache so bad.  \"    OBJECTIVE    Modality rationale: decrease pain to improve the patients ability to tolerate ADLS and activities   Min Type Additional Details    [] Estim:  []Unatt       []IFC  []Premod                        []Other:  []w/ice   []w/heat  Position:  Location:    [] Estim: []Att    []TENS instruct  []NMES                    []Other:  []w/US   []w/ice   []w/heat  Position:  Location:    []  Traction: [] Cervical       []Lumbar                       [] Prone          []Supine                       []Intermittent   []Continuous Lbs:  [] before manual  [] after manual    []  Ultrasound: []Continuous   [] Pulsed                           []1MHz   []3MHz W/cm2:  Location:    []  Iontophoresis with dexamethasone         Location: [] Take home patch   [] In clinic   10 [x]  Ice post     []  heat  []  Ice massage  []  Laser   []  Anodyne Position:prone   Location:Tsp and Lsp    []  Laser with stim  []  Other:  Position:  Location:    []  Vasopneumatic Device Pressure:       [] lo [] med [] hi   Temperature: [] lo [] med [] hi   [] Skin assessment post-treatment:  []intact []redness- no adverse reaction    []redness  adverse reaction:          20 min Therapeutic Exercise:  [x] See flow sheet :   Rationale: increase ROM, increase strength and improve coordination to improve the patients ability to tolerate ADSL and activities    10 min Therapeutic Activity:  [x]  See flow sheet :   Rationale: increase ROM, increase strength, improve coordination, improve balance and increase proprioception  to improve the patients ability to tolerate ADLS and activities        10 min Manual Therapy: In prone Tsp and Lsp PA mobs with STM DTM B Tsp paraspinals, Left LE leg pull LAD    The manual therapy interventions were performed at a separate and distinct time from the therapeutic activities interventions. Rationale: decrease pain, increase ROM and increase tissue extensibility to tolerate ADLS and activities           With   [x] TE   [x] TA   [] neuro   [] other: Patient Education: [x] Review HEP    [x] Progressed/Changed HEP based on:   [] positioning   [] body mechanics   [] transfers   [] heat/ice application    [] other:      Other Objective/Functional Measures: VC exercises and tech. Increased bike to level 2 X 6 minutes     Pain Level (0-10 scale) post treatment: 2-3    ASSESSMENT/Changes in Function: PT observes increased ease with all transfers sit to stand and to the treatment table and increased ease with mobility walking in the clinic. Note leg length left shorter and responds to left LE traction leg pull. Patient will continue to benefit from skilled PT services to modify and progress therapeutic interventions, address ROM deficits, address strength deficits, analyze and address soft tissue restrictions, analyze and cue movement patterns, analyze and modify body mechanics/ergonomics, assess and modify postural abnormalities and instruct in home and community integration to attain remaining goals.      [x]  See Plan of Care  []  See progress note/recertification  []  See Discharge Summary         Progress towards goals / Updated goals:    1 patient will have established and be I with HEP to aid with self management at discharge              EVAL issued              CURRENT reports compliance 12/9              2 patient will have pain 4/10 to aid with increase tolerance for vacuuming the floor              EVAL 5/10, and reports difficulty vacuuming              Current  2-3 12/9                Long Term Goals: To be accomplished in 12 treatments:              1 patient will have pain 1-2/10 to aid with increase tolerance for making th bed              EVAL 5, needs assist from neighbor to make the bed              CURRENT 2-3 12/9              2 patient will have FOTO 58 to show significant, projected gains with usual work and housework              EVAL 40              CURRENT ongoing 12/9              3 patient will have overall reports of improvement at 50% to aid with increased ease in caring for her  and sons              EVAL pain affecting her ability to care for her family              CURRENT NA 12/9  4 patient will tolerate TM 1/4 mile and no significant increase pain for carryover to her walking program              EVAL increase pain with walking              CURRENT NA 12/9       PLAN  [x]  Upgrade activities as tolerated     [x]  Continue plan of care  []  Update interventions per flow sheet       []  Discharge due to:_  []  Other:_      Melita Perdomo, PT 12/9/2020  9:52 AM    Future Appointments   Date Time Provider Rosette Freeman   12/11/2020  2:15 PM Danilo Mathews, PT [facility]   12/14/2020  9:45 AM Jodelle Cap, PTA [facility]   12/15/2020 11:15 AM Isa Rhodes MD [facility] BS AMB   12/16/2020  9:45 AM Jodelle Cap, PTA [facility]   12/18/2020  9:45 AM Jodelle Cap, PTA [facility]   8/2/2021  9:20 AM Nixon Duggan MD [facility] BS AMB

## 2020-12-11 ENCOUNTER — HOSPITAL ENCOUNTER (OUTPATIENT)
Dept: PHYSICAL THERAPY | Age: 70
Discharge: HOME | End: 2020-12-11
Payer: MEDICARE

## 2020-12-11 PROCEDURE — 97110 THERAPEUTIC EXERCISES: CPT

## 2020-12-11 PROCEDURE — 97530 THERAPEUTIC ACTIVITIES: CPT

## 2020-12-11 PROCEDURE — 97140 MANUAL THERAPY 1/> REGIONS: CPT

## 2020-12-11 NOTE — PROGRESS NOTES
PT DAILY TREATMENT NOTE     Patient Name: Finn Zuñiga  Date:2020  : 1950  [x]  Patient  Verified  Payor: [place] MEDICARE / Plan: [place] MEDICARE PART A & B / Product Type: Medicare /    In time:209  Out time:257  Total Treatment Time (min): 48  Visit #: 7 of 12    Medicare/BCBS Only   Total Timed Codes (min):  38 1:1 Treatment Time:  38       Treatment Area: Low back pain [M54.5]    SUBJECTIVE  Pain Level (0-10 scale): 3  Any medication changes, allergies to medications, adverse drug reactions, diagnosis change, or new procedure performed?: [x] No    [] Yes (see summary sheet for update)  Subjective functional status/changes:   [] No changes reported  \"I am tired this afternoon. I have been doing a lot. \"    OBJECTIVE    Modality rationale: decrease pain and increase tissue extensibility to improve the patients ability to tolerate ADLS and acitvities   Min Type Additional Details    [] Estim:  []Unatt       []IFC  []Premod                        []Other:  []w/ice   []w/heat  Position:  Location:    [] Estim: []Att    []TENS instruct  []NMES                    []Other:  []w/US   []w/ice   []w/heat  Position:  Location:    []  Traction: [] Cervical       []Lumbar                       [] Prone          []Supine                       []Intermittent   []Continuous Lbs:  [] before manual  [] after manual    []  Ultrasound: []Continuous   [] Pulsed                           []1MHz   []3MHz W/cm2:  Location:    []  Iontophoresis with dexamethasone         Location: [] Take home patch   [] In clinic   10 [x]  Ice post     []  heat  []  Ice massage  []  Laser   []  Anodyne Position:prone  Location:LS    []  Laser with stim  []  Other:  Position:  Location:    []  Vasopneumatic Device Pressure:       [] lo [] med [] hi   Temperature: [] lo [] med [] hi   [] Skin assessment post-treatment:  []intact []redness- no adverse reaction    []redness  adverse reaction:          20 min Therapeutic Exercise:  [x] See flow sheet :   Rationale: increase ROM, increase strength and improve coordination to improve the patients ability to tolerate ADLs and activities    8 min Therapeutic Activity:  [x]  See flow sheet :   Rationale: increase ROM, increase strength and improve coordination  to improve the patients ability to tolerate ADLS and activities      10 min Manual Therapy: In prone Tsp and Lsp PA mobs with STM DTM B Tsp paraspinals, TPR left Tsp paraspinals   The manual therapy interventions were performed at a separate and distinct time from the therapeutic activities interventions. Rationale: decrease pain, increase ROM, increase tissue extensibility and decrease trigger points to tolerate ADLS and activities          With   [x] TE   [x] TA   [] neuro   [] other: Patient Education: [x] Review HEP    [x] Progressed/Changed HEP based on:   [] positioning   [] body mechanics   [] transfers   [] heat/ice application    [] other:      Other Objective/Functional Measures: VC exercises and tech     Pain Level (0-10 scale) post treatment: 3    ASSESSMENT/Changes in Function: trigger point left Tsp paraspinals. Tolerating exercises well. Patient will continue to benefit from skilled PT services to modify and progress therapeutic interventions, address ROM deficits, address strength deficits, analyze and address soft tissue restrictions, analyze and cue movement patterns, analyze and modify body mechanics/ergonomics, assess and modify postural abnormalities and instruct in home and community integration to attain remaining goals.      [x]  See Plan of Care  []  See progress note/recertification  []  See Discharge Summary         Progress towards goals / Updated goals:      1 patient will have established and be I with HEP to aid with self management at discharge              EVAL issued              CURRENT reports compliance 12/11              2 patient will have pain 4/10 to aid with increase tolerance for vacuuming the floor              EVAL 5/10, and reports difficulty vacuuming              Current 3 12/11                Long Term Goals: To be accomplished in 12 treatments:              1 patient will have pain 1-2/10 to aid with increase tolerance for making th bed              EVAL 5, needs assist from neighbor to make the bed              CURRENT 3 12/11              2 patient will have FOTO 58 to show significant, projected gains with usual work and housework              EVAL 40              CURRENT ongoing 12/11              3 patient will have overall reports of improvement at 50% to aid with increased ease in caring for her  and sons              EVAL pain affecting her ability to care for her family              CURRENT NA 12/11  4 patient will tolerate TM 1/4 mile and no significant increase pain for carryover to her walking program              EVAL increase pain with walking              CURRENT NA 12/11       PLAN  [x]  Upgrade activities as tolerated     [x]  Continue plan of care  []  Update interventions per flow sheet       []  Discharge due to:_  []  Other:_      Charly Villalba, PT 12/11/2020  2:23 PM    Future Appointments   Date Time Provider Rosette Freeman   12/14/2020  9:45 AM Tamiko Tejada PTA [facility] [address]   12/15/2020 11:15 AM Shannan Acevedo MD [facility] AMB   12/16/2020  9:45 AM Callyaakov Tejada PTA [facility] [address]   12/18/2020  9:45 AM Tamiko Tejada PTA [facility] [address]   8/2/2021  9:20 AM Danilo Gallego MD [facility] BS AMB

## 2020-12-14 ENCOUNTER — HOSPITAL ENCOUNTER (OUTPATIENT)
Dept: PHYSICAL THERAPY | Age: 70
Discharge: HOME | End: 2020-12-14
Payer: MEDICARE

## 2020-12-14 PROCEDURE — 97140 MANUAL THERAPY 1/> REGIONS: CPT

## 2020-12-14 PROCEDURE — 97110 THERAPEUTIC EXERCISES: CPT

## 2020-12-14 PROCEDURE — 97530 THERAPEUTIC ACTIVITIES: CPT

## 2020-12-14 NOTE — PROGRESS NOTES
PT DAILY TREATMENT NOTE     Patient Name: Sissy Halsted  Date:2020  : 1950  [x]  Patient  Verified  Payor: [place] MEDICARE / Plan: [place] MEDICARE PART A & B / Product Type: Medicare /    In time: 9:55  Out time:10:46  Total Treatment Time (min): 51  Visit #: 8 of 12    Medicare/BCBS Only   Total Timed Codes (min):  41 1:1 Treatment Time:  41       Treatment Area: Low back pain [M54.5]    SUBJECTIVE  Pain Level (0-10 scale): 0  Any medication changes, allergies to medications, adverse drug reactions, diagnosis change, or new procedure performed?: [x] No    [] Yes (see summary sheet for update)  Subjective functional status/changes:   [] No changes reported  \"Im feeling ok. \"    OBJECTIVE    Modality rationale: decrease edema, decrease inflammation, decrease pain, increase tissue extensibility and increase muscle contraction/control to improve the patients ability to perform ADL    Min Type Additional Details    [] Estim:  []Unatt       []IFC  []Premod                        []Other:  []w/ice   []w/heat  Position:  Location:    [] Estim: []Att    []TENS instruct  []NMES                    []Other:  []w/US   []w/ice   []w/heat  Position:  Location:    []  Traction: [] Cervical       []Lumbar                       [] Prone          []Supine                       []Intermittent   []Continuous Lbs:  [] before manual  [] after manual    []  Ultrasound: []Continuous   [] Pulsed                           []1MHz   []3MHz W/cm2:  Location:    []  Iontophoresis with dexamethasone         Location: [] Take home patch   [] In clinic   10 [x]  Ice     []  heat  []  Ice massage  []  Laser   []  Anodyne Position:prone  Location:(B) TSP    []  Laser with stim  []  Other:  Position:  Location:    []  Vasopneumatic Device Pressure:       [] lo [] med [] hi   Temperature: [] lo [] med [] hi   [x] Skin assessment post-treatment:  [x]intact []redness- no adverse reaction    []redness  adverse reaction:      min []Eval []Re-Eval       25 min Therapeutic Exercise:  [x] See flow sheet :   Rationale: increase ROM and increase strength to improve the patients ability to perform ADL     8 min Therapeutic Activity:  [x]  See flow sheet :   Rationale: increase ROM, increase strength and improve coordination  to improve the patients ability to perform ADL       min Neuromuscular Re-education:  []  See flow sheet :   Rationale: increase ROM, increase strength, improve coordination, improve balance and increase proprioception  to improve the patients ability to perform ADL     8 min Manual Therapy:  Checked alignment: P/A mob TSP /LSP  prone   The manual therapy interventions were performed at a separate and distinct time from the therapeutic activities interventions. Rationale: decrease pain, increase ROM, increase tissue extensibility, decrease edema , decrease trigger points and increase postural awareness to perform ADL      min Gait Training:  ___ feet with ___ device on level surfaces with ___ level of assist   Rationale: With   [x] TE   [] TA   [] neuro   [] other: Patient Education: [x] Review HEP    [] Progressed/Changed HEP based on:   [] positioning   [] body mechanics   [] transfers   [] heat/ice application    [] other:      Other Objective/Functional Measures:      Pain Level (0-10 scale) post treatment: 0    ASSESSMENT/Changes in Function: Pt was TTP at TSP at bra scrap line. Pt experienced minimal pain at right LSP following bike. (B) innominate was symetrical today. Pt is progressing towards achieving all goals.     Patient will continue to benefit from skilled PT services to address functional mobility deficits, address ROM deficits, address strength deficits, analyze and address soft tissue restrictions, analyze and cue movement patterns, analyze and modify body mechanics/ergonomics, assess and modify postural abnormalities and instruct in home and community integration to attain remaining goals.      [x]  See Plan of Care  []  See progress note/recertification  []  See Discharge Summary         Progress towards goals / Updated goals:      1 patient will have established and be I with HEP to aid with self management at discharge              EVAL issued              CURRENT reports compliance 12/11              2 patient will have pain 4/10 to aid with increase tolerance for vacuuming the floor              EVAL 5/10, and reports difficulty vacuuming              Current 3 12/11  Progressing towards  12/14                Long Term Goals: To be accomplished in 12 treatments:              1 patient will have pain 1-2/10 to aid with increase tolerance for making th bed              EVAL 5, needs assist from neighbor to make the bed              CURRENT 3 12/11              2 patient will have FOTO 58 to show significant, projected gains with usual work and housework              EVAL 40              CURRENT ongoing 12/11              3 patient will have overall reports of improvement at 50% to aid with increased ease in caring for her  and sons              EVAL pain affecting her ability to care for her family              CURRENT NA 12/11  4 patient will tolerate TM 1/4 mile and no significant increase pain for carryover to her walking program              EVAL increase pain with walking              CURRENT NA 12/11    PLAN  []  Upgrade activities as tolerated     []  Continue plan of care  []  Update interventions per flow sheet       []  Discharge due to:_  []  Other:_      Michelle Mcdowell PTA 12/14/2020  10:00 AM    Future Appointments   Date Time Provider Rosette Freeman   12/15/2020 11:15 AM Lyubov Oscar MD [facility] BS AMB   12/16/2020  9:45 AM Saira Millan PTA [facility] [address]   12/18/2020  9:45 AM Saira Millan PTA [facility] [address]   8/2/2021  9:20 AM Brian Babin MD [facility] BS AMB

## 2020-12-15 ENCOUNTER — OFFICE VISIT (OUTPATIENT)
Dept: ORTHOPEDIC SURGERY | Age: 70
End: 2020-12-15
Payer: MEDICARE

## 2020-12-15 VITALS
BODY MASS INDEX: 29.26 KG/M2 | HEIGHT: 62 IN | SYSTOLIC BLOOD PRESSURE: 162 MMHG | TEMPERATURE: 98.1 F | RESPIRATION RATE: 20 BRPM | HEART RATE: 61 BPM | DIASTOLIC BLOOD PRESSURE: 100 MMHG

## 2020-12-15 DIAGNOSIS — M54.2 NECK PAIN: ICD-10-CM

## 2020-12-15 DIAGNOSIS — M53.3 SACROILIAC JOINT DYSFUNCTION OF LEFT SIDE: ICD-10-CM

## 2020-12-15 DIAGNOSIS — M25.511 RIGHT SHOULDER PAIN, UNSPECIFIED CHRONICITY: ICD-10-CM

## 2020-12-15 DIAGNOSIS — M54.16 LUMBAR RADICULOPATHY: Primary | ICD-10-CM

## 2020-12-15 PROCEDURE — G8753 SYS BP > OR = 140: HCPCS | Performed by: PHYSICAL MEDICINE & REHABILITATION

## 2020-12-15 PROCEDURE — G8417 CALC BMI ABV UP PARAM F/U: HCPCS | Performed by: PHYSICAL MEDICINE & REHABILITATION

## 2020-12-15 PROCEDURE — 3017F COLORECTAL CA SCREEN DOC REV: CPT | Performed by: PHYSICAL MEDICINE & REHABILITATION

## 2020-12-15 PROCEDURE — G8755 DIAS BP > OR = 90: HCPCS | Performed by: PHYSICAL MEDICINE & REHABILITATION

## 2020-12-15 PROCEDURE — G8399 PT W/DXA RESULTS DOCUMENT: HCPCS | Performed by: PHYSICAL MEDICINE & REHABILITATION

## 2020-12-15 PROCEDURE — G9899 SCRN MAM PERF RSLTS DOC: HCPCS | Performed by: PHYSICAL MEDICINE & REHABILITATION

## 2020-12-15 PROCEDURE — 1090F PRES/ABSN URINE INCON ASSESS: CPT | Performed by: PHYSICAL MEDICINE & REHABILITATION

## 2020-12-15 PROCEDURE — G8427 DOCREV CUR MEDS BY ELIG CLIN: HCPCS | Performed by: PHYSICAL MEDICINE & REHABILITATION

## 2020-12-15 PROCEDURE — G8536 NO DOC ELDER MAL SCRN: HCPCS | Performed by: PHYSICAL MEDICINE & REHABILITATION

## 2020-12-15 PROCEDURE — G8432 DEP SCR NOT DOC, RNG: HCPCS | Performed by: PHYSICAL MEDICINE & REHABILITATION

## 2020-12-15 PROCEDURE — 99213 OFFICE O/P EST LOW 20 MIN: CPT | Performed by: PHYSICAL MEDICINE & REHABILITATION

## 2020-12-15 PROCEDURE — 1101F PT FALLS ASSESS-DOCD LE1/YR: CPT | Performed by: PHYSICAL MEDICINE & REHABILITATION

## 2020-12-15 NOTE — PROGRESS NOTES
[address]  [address], [address]  [place], [facility] [address]  Phone: [phone]  Fax: [phone]        Maribel Espinosa  : 1950  PCP: Kris Wolf MD  12/15/2020    PROGRESS NOTE      HISTORY OF PRESENT ILLNESS  Robson Munroe is a 72 y.o. female who was seen as a new patient 2020 with  c/o low back pain bilaterally, but more localized towards the left SI joint and buttock x 3 years. Her pain seems to flare up every summer. She has tried Flexeril and OTC NSAIDs without benefit. She also c/o pain and bruising near her sacrum. She was previously seen in 2017 by Dr. Shamika Latham and she was given left iliolumbar trigger point injections without benefit. She was also given Gabapentin. She attended PT () with benefit with US and laser therapy. She failed to return for follow-up. Lumbar spine MRI dated 2020 reviewed. Per report, Slightly larger disc protrusion at L4-5 which causes relative mild spinal canal narrowing. Protrusion contacts but does not clearly compress the crossing L5 nerve roots. Anomalous appearing left L5-S1 facet in conjunction with a disc osteophyte complex causing severe left foraminal stenosis with compression of the partially conjoined and late exiting L5 nerve root, stable. Stable moderate right foraminal stenosis at L4-5. Stable mild to moderate right foraminal stenosis at L5-S1. Robson Munroe comes in to the office today for f/u. She underwent a Left L5 TFESI (2020; Dr. Louann Blizzard) with benefit after 1 week. She is finding the most benefit with PT (-current; Kateryna). She also c/o right shoulder pain and notes that she has an MRI that revealed a rotator cuff tear. She found benefit from an injection 6 years ago. Pain Score: 0/10.     Treatments patient has tried:  Physical therapy:Yes -  with benefit  Doing HEP: Yes  Non-opioid medications: Yes  Spinal injections:  Yes - left L5 TFESI () with benefit  Spinal surgery- No.   Last Lumbar MRI 2020     PmHx: Cardiomyopathy, hypothyroidism    ASSESSMENT  This is a 72year-old female with chronic low back pain radiating into the bilateral buttocks (L>R). Her symptoms may be due to a left L5 radiculopathy vs left SI joint dysfunction. She had 3/5 positive left SI joint tests. She also c/o right shoulder pain that is likely due to a rotator cuff pathology and compensatory neck pain. PLAN  1. Continue PT & HEP. 2. Referral to sports medicine for right shoulder and neck pain. Pt will f/u in 3 months or sooner as needed. Diagnoses and all orders for this visit:    1. Lumbar radiculopathy    2. Sacroiliac joint dysfunction of left side    3. Right shoulder pain, unspecified chronicity  -     REFERRAL TO SPORTS MEDICINE    4. Neck pain  -     REFERRAL TO SPORTS MEDICINE       PAST MEDICAL HISTORY   Past Medical History:   Diagnosis Date    Acquired ventricular hypertrophy     Allergic rhinitis     Bladder incontinence     Cardiac echocardiogram 05/11/2016    On-Sight Ultrasound:  EF 48%. No RWMA on segments seen. Mild conc LVH. Gr 1 DDfx. Normal RVSP. Mild TR.  Cardiac nuclear imaging test, low risk 06/15/2016    Low risk. No ischemia or prior infarction. No RWMA. EF 71%. Neg EKG on maximal EST. Ex time 8 min 45 sec.  Cardiomyopathy ([address]     Cardiovascular disease     Esophageal reflux     Essential hypertension     Hyperlipidemia     Numerous moles     Sleep apnea     Thyroid disease     Vitamin D deficiency        Past Surgical History:   Procedure Laterality Date    APPENDECTOMY      HX CATARACT REMOVAL      HX LEEP PROCEDURE     . MEDICATIONS      Current Outpatient Medications   Medication Sig Dispense Refill    BIOTIN PO Take  by mouth.  multivitamin (ONE A DAY) tablet Take 1 Tab by mouth daily.  mirabegron ER (Myrbetriq) 50 mg ER tablet Take 1 Tab by mouth daily.  90 Tab 2    Synthroid 150 mcg tablet       aspirin delayed-release 81 mg tablet Take  by mouth daily.  coenzyme q10 (Co Q-10) 10 mg cap Take  by mouth.  telmisartan (MICARDIS) 40 mg tablet       cetirizine (ZYRTEC) 10 mg tablet       pitavastatin calcium (LIVALO) 2 mg tablet Take  by mouth daily.  pantoprazole (PROTONIX) 40 mg tablet Take  by mouth.  REFRESH TEARS 0.5 % drop ophthalmic solution       meloxicam (MOBIC) 7.5 mg tablet       levothyroxine (SYNTHROID) 112 mcg tablet Take 150 mcg by mouth.  omega-3 fatty acids-vitamin e 1,000 mg cap Take 1 Cap by mouth two (2) times a day.  ergocalciferol (ERGOCALCIFEROL) 50,000 unit capsule Take 50,000 Units by mouth every seven (7) days. ALLERGIES    Allergies   Allergen Reactions    Septra [Sulfamethoprim] Other (comments)     Upset stomach, makes patient real sick          SOCIAL HISTORY    Social History     Socioeconomic History    Marital status:      Spouse name: Not on file    Number of children: Not on file    Years of education: Not on file    Highest education level: Not on file   Tobacco Use    Smoking status: Current Some Day Smoker    Smokeless tobacco: Never Used   Substance and Sexual Activity    Alcohol use: No    Drug use: No       FAMILY HISTORY    Family History   Problem Relation Age of Onset    Heart Attack Brother     High Cholesterol Father     Arthritis-osteo Father     Breast Cancer Sister          REVIEW OF SYSTEMS  Review of Systems   Constitutional: Negative for chills, fever and weight loss. Respiratory: Negative for shortness of breath. Cardiovascular: Negative for chest pain. Gastrointestinal: Negative for constipation. Negative for fecal incontinence    Genitourinary: Negative for dysuria. Negative for urinary incontinence   Musculoskeletal: Positive for back pain. Left buttock pain    Skin: Negative for rash.    Neurological: Negative for dizziness, tingling, tremors, focal weakness and headaches. Endo/Heme/Allergies: Does not bruise/bleed easily. Psychiatric/Behavioral: The patient does not have insomnia. PHYSICAL EXAMINATION  There were no vitals taken for this visit. Pain Assessment  8/10/2020   Location of Pain Wrist   Location Modifiers Right   Severity of Pain 0   Quality of Pain -   Quality of Pain Comment -   Duration of Pain -   Frequency of Pain Intermittent   Aggravating Factors -   Aggravating Factors Comment -   Limiting Behavior Some   Relieving Factors -   Relieving Factors Comment -   Result of Injury Yes   Work-Related Injury No   Type of Injury Fall           Constitutional:  Well developed, well nourished, in no acute distress. Psychiatric: Affect and mood are appropriate. Integumentary: No rashes or abrasions noted on exposed areas. SPINE/MUSCULOSKELETAL EXAM    Lumbar spine:  No rash, ecchymosis, or gross obliquity. No fasciculations. No focal atrophy is noted. No pain with hip ROM. Full range of motion. Mild tenderness to palpation. No tenderness to palpation at the sciatic notch. SI joint tender on the left. Trochanters non tender. Sensation in the bilateral legs grossly intact to light touch.             LEFT RIGHT   CHADD TEST + -   P4 TEST + -   COMPRESSION TEST - -   DISTRACTION TEST - -   GAENSLEN'S TEST + -        Updates 12/15/2020:  Positive Olivares sign on the R      MOTOR:      Biceps  Triceps Deltoids Wrist Ext Wrist Flex Hand Intrin   Right 5/5 5/5 5/5 5/5 5/5 5/5   Left 5/5 5/5 5/5 5/5 5/5 5/5             Hip Flex  Quads Hamstrings Ankle DF EHL Ankle PF   Right 5/5 5/5 5/5 5/5 5/5 5/5   Left 5/5 5/5 5/5 5/5 5/5 5/5     DTRs are 2+ biceps, triceps, brachioradialis, patella, and Achilles.     Negative Straight Leg raise. Squat not tested. No difficulty with tandem gait.       Ambulation without assistive device.  FWB.       RADIOGRAPHS/DATA  Lumbar MRI images taken on 11/4/2020 personally reviewed with patient:  Straightening of the lordosis, stable. No significant  spondylolisthesis. Normal vertebral body heights. Stable hemangioma in the L2 vertebral body. The conus medullaris is normal in signal and caliber ending at the upper L1 level. Paraspinal soft tissues are unremarkable.     Spinal canal and neural foramen:      T12-L1: Disc is unremarkable. No spinal canal or foraminal stenosis.     L1-2: Minimal disc bulge. No spinal canal or foraminal stenosis.     L2-3: Mild disc bulge. No spinal canal stenosis but there is trace narrowing of the neural foramen.     L3-4: Mild disc bulge and facet DJD. No spinal canal stenosis but there is trace/mild foraminal narrowing, stable.     L4-5: Slightly larger central disc protrusion which again contacts but does not clearly distort or compress the crossing L5 nerve roots. Mild right greater than left facet DJD. Relative mild spinal canal narrowing. Nonstenotic left neural  foramen. Moderate right foraminal stenosis, stable. There is contact of the  right L4 nerve root.     L5-S1: Mild disc bulge and right facet hypertrophy. 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 Partially conjoined left L5 and S1 nerve roots and  right S1 and S2 nerve roots. Similar appearing left foraminal stenosis with disc  osteophyte compressing the late exiting L5 nerve root. Early exiting left S1  nerve root at the lateral recess without definite compression. Stable mild to  moderate right foraminal stenosis. No central spinal canal narrowing.     IMPRESSION  IMPRESSION:     1.  Slightly larger disc protrusion at L4-5 which causes relative mild spinal  canal narrowing. Protrusion contacts but does not clearly compress the crossing  L5 nerve roots. 2.  Anomalous appearing left L5-S1 facet in conjunction with a disc osteophyte  complex causing severe left foraminal stenosis with compression of the partially  conjoined and late exiting L5 nerve root, stable.   3.  Stable moderate right foraminal stenosis at L4-5. 4.  Stable mild to moderate right foraminal stenosis at L5-S1.     RUE EMG 2/28/2020:  NCV & EMG Findings:  All nerve conduction studies (as indicated in the following tables) were within normal limits.       All examined muscles (as indicated in the following table) showed no evidence of electrical instability     INTERPRETATION  This was a normal nerve conduction and EMG study showing there to be no signs of neuropathy, myopathy, or radiculopathy in the nerves and muscles tested.     10 minutes of face-to-face contact were spent with the patient during today's visit extensively discussing symptoms and treatment plan. All questions were answered. More than half of this visit today was spent on counseling.      Written by Tania Schrader as dictated by Janis Whyte MD

## 2020-12-15 NOTE — PATIENT INSTRUCTIONS
Rotator Cuff: Exercises Introduction Here are some examples of exercises for you to try. The exercises may be suggested for a condition or for rehabilitation. Start each exercise slowly. Ease off the exercises if you start to have pain. You will be told when to start these exercises and which ones will work best for you. How to do the exercises Pendulum swing If you have pain in your back, do not do this exercise. 1. Hold on to a table or the back of a chair with your good arm. Then bend forward a little and let your sore arm hang straight down. This exercise does not use the arm muscles. Rather, use your legs and your hips to create movement that makes your arm swing freely. 2. Use the movement from your hips and legs to guide the slightly swinging arm back and forth like a pendulum (or elephant trunk). Then guide it in circles that start small (about the size of a dinner plate). Make the circles a bit larger each day, as your pain allows. 3. Do this exercise for 5 minutes, 5 to 7 times each day. 4. As you have less pain, try bending over a little farther to do this exercise. This will increase the amount of movement at your shoulder. Posterior stretching exercise 1. Hold the elbow of your injured arm with your other hand. 2. Use your hand to pull your injured arm gently up and across your body. You will feel a gentle stretch across the back of your injured shoulder. 3. Hold for at least 15 to 30 seconds. Then slowly lower your arm. 4. Repeat 2 to 4 times. Up-the-back stretch Your doctor or physical therapist may want you to wait to do this stretch until you have regained most of your range of motion and strength. You can do this stretch in different ways. Hold any of these stretches for at least 15 to 30 seconds. Repeat them 2 to 4 times. 1. Light stretch: Put your hand in your back pocket. Let it rest there to stretch your shoulder. 2. Moderate stretch: With your other hand, hold your injured arm (palm outward) behind your back by the wrist. Pull your arm up gently to stretch your shoulder. 3. Advanced stretch: Put a towel over your other shoulder. Put the hand of your injured arm behind your back. Now hold the back end of the towel. With the other hand, hold the front end of the towel in front of your body. Pull gently on the front end of the towel. This will bring your hand farther up your back to stretch your shoulder. Overhead stretch 1. Standing about an arm's length away, grasp onto a solid surface. You could use a countertop, a doorknob, or the back of a sturdy chair. 2. With your knees slightly bent, bend forward with your arms straight. Lower your upper body, and let your shoulders stretch. 3. As your shoulders are able to stretch farther, you may need to take a step or two backward. 4. Hold for at least 15 to 30 seconds. Then stand up and relax. If you had stepped back during your stretch, step forward so you can keep your hands on the solid surface. 5. Repeat 2 to 4 times. Shoulder flexion (lying down) To make a wand for this exercise, use a piece of PVC pipe or a broom handle with the broom removed. Make the wand about a foot wider than your shoulders. 1. Lie on your back, holding a wand with both hands. Your palms should face down as you hold the wand. 2. Keeping your elbows straight, slowly raise your arms over your head. Raise them until you feel a stretch in your shoulders, upper back, and chest. 
3. Hold for 15 to 30 seconds. 4. Repeat 2 to 4 times. Shoulder rotation (lying down) To make a wand for this exercise, use a piece of PVC pipe or a broom handle with the broom removed. Make the wand about a foot wider than your shoulders. 1. Lie on your back. Hold a wand with both hands with your elbows bent and palms up. 2. Keep your elbows close to your body, and move the wand across your body toward the sore arm. 3. Hold for 8 to 12 seconds. 4. Repeat 2 to 4 times. Wall climbing (to the side) Avoid any movement that is straight to your side, and be careful not to arch your back. Your arm should stay about 30 degrees to the front of your side. 1. Stand with your side to a wall so that your fingers can just touch it at an angle about 30 degrees toward the front of your body. 2. Walk the fingers of your injured arm up the wall as high as pain permits. Try not to shrug your shoulder up toward your ear as you move your arm up. 3. Hold that position for a count of at least 15 to 20. 
4. Walk your fingers back down to the starting position. 5. Repeat at least 2 to 4 times. Try to reach higher each time. Wall climbing (to the front) During this stretching exercise, be careful not to arch your back. 1. Face a wall, and stand so your fingers can just touch it. 2. Keeping your shoulder down, walk the fingers of your injured arm up the wall as high as pain permits. (Don't shrug your shoulder up toward your ear.) 3. Hold your arm in that position for at least 15 to 30 seconds. 4. Slowly walk your fingers back down to where you started. 5. Repeat at least 2 to 4 times. Try to reach higher each time. Shoulder blade squeeze 1. Stand with your arms at your sides, and squeeze your shoulder blades together. Do not raise your shoulders up as you squeeze. 2. Hold 6 seconds. 3. Repeat 8 to 12 times. Scapular exercise: Arm reach 1. Lie flat on your back. This exercise is a very slight motion that starts with your arms raised (elbows straight, arms straight). 2. From this position, reach higher toward the reagan or ceiling. Keep your elbows straight. All motion should be from your shoulder blade only. 3. Relax your arms back to where you started. 4. Repeat 8 to 12 times. Arm raise to the side During this strengthening exercise, your arm should stay about 30 degrees to the front of your side. 1. Slowly raise your injured arm to the side, with your thumb facing up. Raise your arm 60 degrees at the most (shoulder level is 90 degrees). 2. Hold the position for 3 to 5 seconds. Then lower your arm back to your side. If you need to, bring your \"good\" arm across your body and place it under the elbow as you lower your injured arm. Use your good arm to keep your injured arm from dropping down too fast. 
3. Repeat 8 to 12 times. 4. When you first start out, don't hold any extra weight in your hand. As you get stronger, you may use a 1-pound to 2-pound dumbbell or a small can of food. Shoulder flexor and extensor exercise These are isometric exercises. That means you contract your muscles without actually moving. 1. Push forward (flex): Stand facing a wall or doorjamb, about 6 inches or less back. Hold your injured arm against your body. Make a closed fist with your thumb on top. Then gently push your hand forward into the wall with about 25% to 50% of your strength. Don't let your body move backward as you push. Hold for about 6 seconds. Relax for a few seconds. Repeat 8 to 12 times. 2. Push backward (extend): Stand with your back flat against a wall. Your upper arm should be against the wall, with your elbow bent 90 degrees (your hand straight ahead). Push your elbow gently back against the wall with about 25% to 50% of your strength. Don't let your body move forward as you push. Hold for about 6 seconds. Relax for a few seconds. Repeat 8 to 12 times. Scapular exercise: Wall push-ups This exercise is best done with your fingers somewhat turned out, rather than straight up and down. 1. Stand facing a wall, about 12 inches to 18 inches away. 2. Place your hands on the wall at shoulder height. 3. Slowly bend your elbows and bring your face to the wall. Keep your back and hips straight. 4. Push back to where you started. 5. Repeat 8 to 12 times. 6. When you can do this exercise against a wall comfortably, you can try it against a counter. You can then slowly progress to the end of a couch, then to a sturdy chair, and finally to the floor. Scapular exercise: Retraction For this exercise, you will need elastic exercise material, such as surgical tubing or Thera-Band. 1. Put the band around a solid object at about waist level. (A bedpost will work well.) Each hand should hold an end of the band. 2. With your elbows at your sides and bent to 90 degrees, pull the band back. Your shoulder blades should move toward each other. Then move your arms back where you started. 3. Repeat 8 to 12 times. 4. If you have good range of motion in your shoulders, try this exercise with your arms lifted out to the sides. Keep your elbows at a 90-degree angle. Raise the elastic band up to about shoulder level. Pull the band back to move your shoulder blades toward each other. Then move your arms back where you started. Internal rotator strengthening exercise 1. Start by tying a piece of elastic exercise material to a doorknob. You can use surgical tubing or Thera-Band. 2. Stand or sit with your shoulder relaxed and your elbow bent 90 degrees. Your upper arm should rest comfortably against your side. Squeeze a rolled towel between your elbow and your body for comfort. This will help keep your arm at your side. 3. Hold one end of the elastic band in the hand of the painful arm. 4. Slowly rotate your forearm toward your body until it touches your belly. Slowly move it back to where you started. 5. Keep your elbow and upper arm firmly tucked against the towel roll or at your side. 6. Repeat 8 to 12 times. External rotator strengthening exercise 1. Start by tying a piece of elastic exercise material to a doorknob. You can use surgical tubing or Thera-Band. (You may also hold one end of the band in each hand.) 2. Stand or sit with your shoulder relaxed and your elbow bent 90 degrees. Your upper arm should rest comfortably against your side. Squeeze a rolled towel between your elbow and your body for comfort. This will help keep your arm at your side. 3. Hold one end of the elastic band with the hand of the painful arm. 4. Start with your forearm across your belly. Slowly rotate the forearm out away from your body. Keep your elbow and upper arm tucked against the towel roll or the side of your body until you begin to feel tightness in your shoulder. Slowly move your arm back to where you started. 5. Repeat 8 to 12 times. Follow-up care is a key part of your treatment and safety. Be sure to make and go to all appointments, and call your doctor if you are having problems. It's also a good idea to know your test results and keep a list of the medicines you take. Where can you learn more? Go to http://www.resendiz.com/ Enter Machelle Sheffield in the search box to learn more about \"Rotator Cuff: Exercises. \" Current as of: March 2, 2020               Content Version: 12.6 © 5878-8509 [employer]. Care instructions adapted under license by [employer] (which disclaims liability or warranty for this information). If you have questions about a medical condition or this instruction, always ask your healthcare professional. [place] [address] any warranty or liability for your use of this information.

## 2020-12-16 ENCOUNTER — APPOINTMENT (OUTPATIENT)
Dept: PHYSICAL THERAPY | Age: 70
End: 2020-12-16
Payer: MEDICARE

## 2020-12-18 ENCOUNTER — HOSPITAL ENCOUNTER (OUTPATIENT)
Dept: PHYSICAL THERAPY | Age: 70
Discharge: HOME | End: 2020-12-18
Payer: MEDICARE

## 2020-12-18 PROCEDURE — 97110 THERAPEUTIC EXERCISES: CPT

## 2020-12-18 PROCEDURE — 97530 THERAPEUTIC ACTIVITIES: CPT

## 2020-12-18 PROCEDURE — 97140 MANUAL THERAPY 1/> REGIONS: CPT

## 2020-12-18 NOTE — PROGRESS NOTES
PT DAILY TREATMENT NOTE     Patient Name: Sissy Halsted  Date:2020  : 1950  [x]  Patient  Verified  Payor: [place] MEDICARE / Plan: [place] MEDICARE PART A & B / Product Type: Medicare /    In time:9:54  Out time:10:46  Total Treatment Time (min): 48  Visit #: 9 of 12    Medicare/BCBS Only   Total Timed Codes (min):  38 1:1 Treatment Time:  38       Treatment Area: Low back pain [M54.5]    SUBJECTIVE  Pain Level (0-10 scale): 3  Any medication changes, allergies to medications, adverse drug reactions, diagnosis change, or new procedure performed?: [x] No    [] Yes (see summary sheet for update)  Subjective functional status/changes:   [] No changes reported  \"My back is hurting today. \"  OBJECTIVE    Modality rationale: decrease edema, decrease inflammation, decrease pain, increase tissue extensibility and increase muscle contraction/control to improve the patients ability to perform ADL    Min Type Additional Details    [] Estim:  []Unatt       []IFC  []Premod                        []Other:  []w/ice   []w/heat  Position:  Location:    [] Estim: []Att    []TENS instruct  []NMES                    []Other:  []w/US   []w/ice   []w/heat  Position:  Location:    []  Traction: [] Cervical       []Lumbar                       [] Prone          []Supine                       []Intermittent   []Continuous Lbs:  [] before manual  [] after manual    []  Ultrasound: []Continuous   [] Pulsed                           []1MHz   []3MHz W/cm2:  Location:    []  Iontophoresis with dexamethasone         Location: [] Take home patch   [] In clinic   10 [x]  Ice     []  heat  []  Ice massage  []  Laser   []  Anodyne Position:prone  Location:left glute region    []  Laser with stim  []  Other:  Position:  Location:    []  Vasopneumatic Device Pressure:       [] lo [] med [] hi   Temperature: [] lo [] med [] hi   [x] Skin assessment post-treatment:  [x]intact []redness- no adverse reaction    []redness  adverse reaction: min []Eval                  []Re-Eval       20 min Therapeutic Exercise:  [x] See flow sheet :   Rationale: increase ROM and increase strength to improve the patients ability to perform ADL     8 min Therapeutic Activity:  [x]  See flow sheet :   Rationale: increase ROM and increase strength  to improve the patients ability to perform ADL       min Neuromuscular Re-education:  []  See flow sheet :   Rationale: increase ROM, increase strength, improve coordination, improve balance and increase proprioception  to improve the patients ability to perform ADL     10 min Manual Therapy:  TPR left glute/piriformis prone/checked alignment prone   The manual therapy interventions were performed at a separate and distinct time from the therapeutic activities interventions. Rationale: decrease pain, increase ROM, increase tissue extensibility and decrease edema  to perform ADL      min Gait Training:  ___ feet with ___ device on level surfaces with ___ level of assist   Rationale: With   [x] TE   [] TA   [] neuro   [] other: Patient Education: [x] Review HEP    [] Progressed/Changed HEP based on:   [] positioning   [] body mechanics   [] transfers   [] heat/ice application    [] other:      Other Objective/Functional Measures: (B) innominate was symmetrical     Pain Level (0-10 scale) post treatment: 0    ASSESSMENT/Changes in Function: Pt was TTP at upper glute/piriformis during manual. Pt completed each strengthening and flexibility there ex. Patient will continue to benefit from skilled PT services to address functional mobility deficits, address ROM deficits, address strength deficits, analyze and address soft tissue restrictions, analyze and cue movement patterns, analyze and modify body mechanics/ergonomics, assess and modify postural abnormalities and instruct in home and community integration to attain remaining goals.      [x]  See Plan of Care  []  See progress note/recertification  []  See Discharge Summary         Progress towards goals / Updated goals:   1 patient will have established and be I with HEP to aid with self management at discharge              EVAL issued              CURRENT reports compliance 12/11              2 patient will have pain 4/10 to aid with increase tolerance for vacuuming the floor              EVAL 5/10, and reports difficulty vacuuming              Current 3 12/11  Progressing towards  12/14,12/18                [address] be accomplished in 12 treatments:              1 patient will have pain 1-2/10 to aid with increase tolerance for making th bed              EVAL 5, needs assist from neighbor to make the bed              CURRENT 3 12/11              2 patient will have FOTO 58 to show significant, projected gains with usual work and housework              EVAL 40              CURRENT ongoing 12/11              3 patient will have overall reports of improvement at 50% to aid with increased ease in caring for her  and sons              EVAL pain affecting her ability to care for her family              CURRENT NA 12/11 progressing towards 12/18  4 patient will tolerate TM 1/4 mile and no significant increase pain for carryover to her walking program              EVAL increase pain with walking              CURRENT NA 12/11    PLAN  []  Upgrade activities as tolerated     [x]  Continue plan of care  []  Update interventions per flow sheet       []  Discharge due to:_  [x]  Other:_  [facility] [place]    Crystal Jeremias, PTA 12/18/2020  10:23 AM    Future Appointments   Date Time Provider Rosette Freeman   12/22/2020  9:45 AM Velvet Meter, PT [facility]   8/2/2021  9:20 AM Ngoc Tovar MD [facility] AM[facility]

## 2020-12-22 ENCOUNTER — HOSPITAL ENCOUNTER (OUTPATIENT)
Dept: PHYSICAL THERAPY | Age: 70
Discharge: HOME | End: 2020-12-22
Payer: MEDICARE

## 2020-12-22 PROCEDURE — 97530 THERAPEUTIC ACTIVITIES: CPT | Performed by: PHYSICAL THERAPIST

## 2020-12-22 PROCEDURE — 97110 THERAPEUTIC EXERCISES: CPT | Performed by: PHYSICAL THERAPIST

## 2020-12-22 PROCEDURE — 97140 MANUAL THERAPY 1/> REGIONS: CPT | Performed by: PHYSICAL THERAPIST

## 2020-12-22 NOTE — PROGRESS NOTES
PT DAILY TREATMENT NOTE     Patient Name: Josesito Camacho  Date:2020  : 1950  [x]  Patient  Verified  Payor: VA MEDICARE / Plan: VA MEDICARE PART A & B / Product Type: Medicare /    In time:12:00P  Out time:100P  Total Treatment Time (min): 60min  Visit #: 10 of 12    Medicare/BCBS Only   Total Timed Codes (min):  55 1:1 Treatment Time:  50       Treatment Area: Low back pain [M54.5]    SUBJECTIVE  Pain Level (0-10 scale): 3/10  Any medication changes, allergies to medications, adverse drug reactions, diagnosis change, or new procedure performed?: [x] No    [] Yes (see summary sheet for update)  Subjective functional status/changes:   [] No changes reported  Patient states she is feeling overall better but wishes her appointment could have been earlier because she always feels so much better in the morning.     OBJECTIVE    Modality rationale: decrease inflammation, decrease pain and increase tissue extensibility to improve the patients ability to decrease exercise-induced muscle soreness   Min Type Additional Details    [] Estim:  []Unatt       []IFC  []Premod                        []Other:  []w/ice   []w/heat  Position:  Location:    [] Estim: []Att    []TENS instruct  []NMES                    []Other:  []w/US   []w/ice   []w/heat  Position:  Location:    []  Traction: [] Cervical       []Lumbar                       [] Prone          []Supine                       []Intermittent   []Continuous Lbs:  [] before manual  [] after manual    []  Ultrasound: []Continuous   [] Pulsed                           []1MHz   []3MHz W/cm2:  Location:    []  Iontophoresis with dexamethasone         Location: [] Take home patch   [] In clinic   10 [x]  Ice, 5 min in conjunction with review of plan of care    []  heat  []  Ice massage  []  Laser   []  Anodyne Position: prone  Location: lumbar spine    []  Laser with stim  []  Other:  Position:  Location:    []  Vasopneumatic Device Pressure:       [] lo [] med [] hi   Temperature: [] lo [] med [] hi   [] Skin assessment post-treatment:  []intact []redness- no adverse reaction    []redness  adverse reaction:     25 min Therapeutic Exercise:  [x] See flow sheet :   Rationale: increase ROM and increase strength to improve the patients ability to A/ROM and decrease pain with movement. 20 min Therapeutic Activity:  [x]  See flow sheet :   Rationale: increase strength and improve coordination  to improve the patients ability to Tolerate basic ADLs and household-related tasks without pain. 10 min Manual Therapy:  Grade 2-3 prone P/As to LSP and TSP   The manual therapy interventions were performed at a separate and distinct time from the therapeutic activities interventions. Rationale: decrease pain, increase ROM and increase tissue extensibility to Tolerate basic ADLs and household-related tasks without pain. With   [x] TE   [x] TA   [] neuro   [] other: Patient Education: [x] Review HEP    [x] Progressed/Changed HEP based on:   [] positioning   [] body mechanics   [] transfers   [] heat/ice application    [] other:      Other Objective/Functional Measures: L lumbar lateral shift noted in standing and in prone     Pain Level (0-10 scale) post treatment: 2/10    ASSESSMENT/Changes in Function: Patient is progressing some towards goals of decreased pain and increased activity tolerance. Continues to have residual pain and more fatigue and aching in the afternoon/evening than in the morning. Would benefit from specific lifting training and body mechanics education.     Patient will continue to benefit from skilled PT services to modify and progress therapeutic interventions, address functional mobility deficits, address ROM deficits, address strength deficits, analyze and address soft tissue restrictions, analyze and cue movement patterns, analyze and modify body mechanics/ergonomics, assess and modify postural abnormalities and instruct in home and community integration to attain remaining goals. [x]  See Plan of Care  []  See progress note/recertification  []  See Discharge Summary         Progress towards goals / Updated goals:  1. Patient will be able to lift 40# from the ground without increased pain or difficulty. PN: unable to lift more than 10lbs   2. Patient will be able to vacuum without difficulty.                 PN: moderate to quite a bit of difficulty    PLAN  [x]  Upgrade activities as tolerated     []  Continue plan of care  []  Update interventions per flow sheet       []  Discharge due to:_  []  Other:_      Maira Steward, PT 12/22/2020  12:57 PM    Future Appointments   Date Time Provider Rosette Freeman   12/28/2020 10:30 AM Connie Andrade [facility]   12/30/2020 11:15 AM Anthony Norton [facility]   8/2/2021  9:20 AM Jacky Zarco MD [facility] BS AMB

## 2020-12-22 NOTE — PROGRESS NOTES
[facility] [address], [address] [place] [address], [facility] [address] [address]  [phone] fax      Continued Plan of Care/ Re-certification for Physical Therapy Services  Patient name: Ben Turner Start of Care: 2020   Referral source: Juani Arias MD : 1950               Medical Diagnosis: Low back pain [M54.5]  Payor: Carlos Ferguson / Plan: VA MEDICARE PART A & B / Product Type: Medicare /  Onset Date:2020               Treatment Diagnosis: left LBP   Prior Hospitalization: see medical history Provider#: [id number]   Medications: Verified on Patient summary List    Comorbidities: HTN, weight change up recently   Prior Level of Function: I all areas of activities and ADLs, no AD use, gardened.  Drove, tolerated household and community activiites, walked 2 miles a day                         Visits from [place] of Care: 10    Missed Visits: 2    The Plan of Care and following information is based on the patient's current status:  1 patient will have established and be I with HEP to aid with self management at discharge              EVAL issued              CURRENT reports compliance               2 patient will have pain 4/10 to aid with increase tolerance for vacuuming the floor              EVAL 5/10, and reports difficulty vacuuming              Current 3   Progressing towards  68/17,88/33                Long Term Goals: To be accomplished in 12 treatments:              1 patient will have pain 1-2/10 to aid with increase tolerance for making th bed              EVAL 5, needs assist from neighbor to make the bed              CURRENT 3               2 patient will have FOTO 58 to show significant, projected gains with usual work and housework              EVAL 40              CURRENT ongoing               3 patient will have overall reports of improvement at 50% to aid with increased ease in caring for her  and sons              EVAL pain affecting her ability to care for her family              CURRENT NA 12/11 progressing towards 12/18  4 patient will tolerate TM 1/4 mile and no significant increase pain for carryover to her walking program              EVAL increase pain with walking              CURRENT NA 12/11     Key functional changes: Patient is feeling significantly better but still feels worse in the evening than the morning. Problems/ barriers to goal attainment: fear avoidance belief     Problem List: pain affecting function, decrease ROM, decrease strength, edema affecting function, impaired gait/ balance, decrease ADL/ functional abilitiies, decrease activity tolerance, decrease flexibility/ joint mobility and decrease transfer abilities    Treatment Plan: Therapeutic exercise, Therapeutic activities, Neuromuscular re-education, Physical agent/modality, Gait/balance training, Manual therapy, Patient education, Self Care training, Functional mobility training, Home safety training, Stair training and Other: lifting >10 lbs     Patient Goal (s) has been updated and includes: less pain and it not come back     Goals for this certification period to be accomplished in 4 weeks:  1. Patient will be able to lift 40# from the ground without increased pain or difficulty. PN: unable to lift more than 10lbs   2. Patient will be able to vacuum without difficulty. PN: moderate to quite a bit of difficulty    Frequency / Duration: Patient to be seen 2 times per week for 4 weeks:    Assessment / Recommendations:Patient is progressing some towards goals of decreased pain and increased activity tolerance. Continues to have residual pain and more fatigue and aching in the afternoon/evening than in the morning. Would benefit from specific lifting training and body mechanics education.     Certification Period: 12/23/2020 - 1/21/2020    Richard Schultz, PT 12/22/2020 12:57 PM    ________________________________________________________________________  I certify that the above Therapy Services are being furnished while the patient is under my care. I agree with the treatment plan and certify that this therapy is necessary. [] I have read the above and request that my patient continue as recommended.   [] I have read the above report and request that my patient continue therapy with the following changes/special instructions: _____________________________________________  [] I have read the above report and request that my patient be discharged from therapy    Physician's Signature:____________Date:_________TIME:________    ** Signature, Date and Time must be completed for valid certification **    Please sign and return to [facility] [address] [place] [address] [address]  [address] [place] [address], [address] [place] [address], [address] [address] [place] [address],[facility] [address] [phone] fax

## 2020-12-28 ENCOUNTER — APPOINTMENT (OUTPATIENT)
Dept: PHYSICAL THERAPY | Age: 70
End: 2020-12-28
Payer: MEDICARE

## 2020-12-30 ENCOUNTER — HOSPITAL ENCOUNTER (OUTPATIENT)
Dept: PHYSICAL THERAPY | Age: 70
Discharge: HOME | End: 2020-12-30
Payer: MEDICARE

## 2020-12-30 PROCEDURE — 97140 MANUAL THERAPY 1/> REGIONS: CPT

## 2020-12-30 PROCEDURE — 97112 NEUROMUSCULAR REEDUCATION: CPT

## 2020-12-30 PROCEDURE — 97110 THERAPEUTIC EXERCISES: CPT

## 2021-01-04 ENCOUNTER — HOSPITAL ENCOUNTER (OUTPATIENT)
Dept: PHYSICAL THERAPY | Age: 71
Discharge: HOME | End: 2021-01-04
Payer: MEDICARE

## 2021-01-04 PROCEDURE — 97530 THERAPEUTIC ACTIVITIES: CPT

## 2021-01-04 PROCEDURE — 97112 NEUROMUSCULAR REEDUCATION: CPT

## 2021-01-04 PROCEDURE — 97110 THERAPEUTIC EXERCISES: CPT

## 2021-01-04 NOTE — PROGRESS NOTES
PT DAILY TREATMENT NOTE 11    Patient Name: Ha Vaca  Date:2021  : 1950  [x]  Patient  Verified  Payor: VA MEDICARE / Plan: VA MEDICARE PART A & B / Product Type: Medicare /    In time:1:35pm  Out time: 2:15pm  Total Treatment Time (min): 40  Visit #: 2 of 8    Medicare/BCBS Only   Total Timed Codes (min):  40 1:1 Treatment Time:  40       Treatment Area: Low back pain [M54.5]    SUBJECTIVE  Pain Level (0-10 scale): 3-4  Any medication changes, allergies to medications, adverse drug reactions, diagnosis change, or new procedure performed?: [x] No    [] Yes (see summary sheet for update)  Subjective functional status/changes:   [] No changes reported  Pt reports she did a ton of work around the home this morning and is sore upon arriving to today's session. Reports full compliance with HEP. OBJECTIVE    22 min Therapeutic Exercise:  [x] See flow sheet :   Rationale: increase ROM and increase strength to improve the patients ability to complete functional activities. 8 min Therapeutic Activity:  [x]  See flow sheet :   Rationale: increase ROM, increase strength, improve coordination and increase proprioception  to improve the patients ability to complete household chores. 10 min Neuromuscular Re-education:  [x]  See flow sheet :   Rationale increase strength, improve coordination and increase proprioception  to improve the patients motor control and proprioceptive input to increase ability to activate correct muscles when performing functional task. With   [x] TE   [x] TA   [x] neuro   [] other: Patient Education: [x] Review HEP    [] Progressed/Changed HEP based on:   [] positioning   [] body mechanics   [] transfers   [] heat/ice application    [] other:         Pain Level (0-10 scale) post treatment: 2     ASSESSMENT/Changes in Function:     Progressed resistance on interventions to increase strength to improve abilities to complete functional activities.  Verbal and tactile cuing required in order to optimize technique, in particular with hip hinge during squatting and TA/glute act. during bridging. Mod therapeutic carryover demonstrated with removal of cuing. At the conclusion of the session, pt reported decreased pain. Pt denied modality application. Patient will continue to benefit from skilled PT services to modify and progress therapeutic interventions, address functional mobility deficits, address ROM deficits, address strength deficits, analyze and address soft tissue restrictions, analyze and cue movement patterns, analyze and modify body mechanics/ergonomics, assess and modify postural abnormalities, address imbalance/dizziness and instruct in home and community integration to attain remaining goals. [x]  See Plan of Care  [x]  See progress note/recertification  []  See Discharge Summary         Progress towards goals / Updated goals:  Goals for this certification period to be accomplished in 4 weeks:  1. Patient will be able to lift 40# from the ground without increased pain or difficulty. PN: unable to lift more than 10lbs   2. Patient will be able to vacuum without difficulty.                 PN: moderate to quite a bit of difficulty   Current: No change, continues to report moderate difficulty with vacuuming, 01/04/2021       PLAN  []  Upgrade activities as tolerated     [x]  Continue plan of care  []  Update interventions per flow sheet       []  Discharge due to:_  []  Other:_      Herminio Juan DPT 1/4/2021  11:16 AM    Future Appointments   Date Time Provider Rosette Freeman   1/6/2021 10:30 AM Guevara Barillas [facility]   1/8/2021  9:45 AM Guevara Barillas [facility]   1/12/2021 10:30 AM Rae Crandall PTA [facility]   8/2/2021  9:20 AM Arcadio Suarez MD [facility] BS AMB

## 2021-01-06 ENCOUNTER — APPOINTMENT (OUTPATIENT)
Dept: PHYSICAL THERAPY | Age: 71
End: 2021-01-06
Payer: MEDICARE

## 2021-01-08 ENCOUNTER — HOSPITAL ENCOUNTER (OUTPATIENT)
Dept: PHYSICAL THERAPY | Age: 71
Discharge: HOME | End: 2021-01-08
Payer: MEDICARE

## 2021-01-08 PROCEDURE — 97112 NEUROMUSCULAR REEDUCATION: CPT

## 2021-01-08 PROCEDURE — 97110 THERAPEUTIC EXERCISES: CPT

## 2021-01-08 PROCEDURE — 97140 MANUAL THERAPY 1/> REGIONS: CPT

## 2021-01-08 NOTE — PROGRESS NOTES
PT DAILY TREATMENT NOTE     Patient Name: Amy Parada  Date:2021  : 1950  [x]  Patient  Verified  Payor: VA MEDICARE / Plan: VA MEDICARE PART A & B / Product Type: Medicare /    In time: 9:45  Out time:10:38  Total Treatment Time (min): 48  Visit #: 3 of 8    Medicare/BCBS Only   Total Timed Codes (min):  43 1:1 Treatment Time:  43       Treatment Area: Low back pain [M54.5]    SUBJECTIVE  Pain Level (0-10 scale): 1  Any medication changes, allergies to medications, adverse drug reactions, diagnosis change, or new procedure performed?: [x] No    [] Yes (see summary sheet for update)  Subjective functional status/changes:   [] No changes reported  \"I feel good, I had a root canal yesterday so I rested all day. \"    OBJECTIVE    Modality rationale: decrease inflammation and decrease pain to improve the patients ability to  tolerate exercises and return to daily activity with ease.    Min Type Additional Details    [] Estim:  []Unatt       []IFC  []Premod                        []Other:  []w/ice   []w/heat  Position:  Location:    [] Estim: []Att    []TENS instruct  []NMES                    []Other:  []w/US   []w/ice   []w/heat  Position:  Location:    []  Traction: [] Cervical       []Lumbar                       [] Prone          []Supine                       []Intermittent   []Continuous Lbs:  [] before manual  [] after manual    []  Ultrasound: []Continuous   [] Pulsed                           []1MHz   []3MHz W/cm2:  Location:    []  Iontophoresis with dexamethasone         Location: [] Take home patch   [] In clinic   10 [x]  Ice     []  heat  []  Ice massage  []  Laser   []  Anodyne Position: prone  Location: Lumbar    []  Laser with stim  []  Other:  Position:  Location:    []  Vasopneumatic Device Pressure:       [] lo [] med [] hi   Temperature: [] lo [] med [] hi   [] Skin assessment post-treatment:  []intact []redness- no adverse reaction    []redness  adverse reaction:     18 min Therapeutic Exercise:  [x] See flow sheet :   Rationale: increase ROM and increase strength to improve the patients ability to perform ADLs with ease    13 min Neuromuscular Re-education:  [x]  See flow sheet :   Rationale: improve coordination and increase proprioception  to improve the patients ability to stabilize, use proper body mechanics, and promote proper postural awareness    12 min Manual Therapy:  DTM/TPR to piroformis, glute med; gentle release of PSIS. The manual therapy interventions were performed at a separate and distinct time from the therapeutic activities interventions. Rationale: decrease pain, increase ROM, increase tissue extensibility, decrease trigger points and increase postural awareness to to increase mobility and reduce restriction with ADLs          With   [x] TE   [x] TA   [] neuro   [] other: Patient Education: [x] Review HEP    [] Progressed/Changed HEP based on:   [x] positioning   [x] body mechanics   [] transfers   [] heat/ice application    [] other:      Other Objective/Functional Measures:   Hip extension exercise in prone L>R increased pain  Deadbugs with SB one side at a time for ease of coordination    Pain Level (0-10 scale) post treatment: 0    ASSESSMENT/Changes in Function: Patient arrived in decreased pain and tolerated exercises well. Increase muscle tightness noted in (B) piriformis and glute med L>R. Patient will continue to benefit from skilled PT services to modify and progress therapeutic interventions, address functional mobility deficits, address ROM deficits, address strength deficits, analyze and address soft tissue restrictions, analyze and cue movement patterns, analyze and modify body mechanics/ergonomics, assess and modify postural abnormalities, address imbalance/dizziness and instruct in home and community integration to attain remaining goals.      []  See Plan of Care  [x]  See progress note/recertification  []  See Discharge Summary Progress towards goals / Updated goals:  Goals for this certification period to be accomplished in 4 weeks:  1. Patient will be able to lift 40# from the ground without increased pain or difficulty.              PN: unable to lift more than 10lbs   2. Patient will be able to vacuum without difficulty.                 HANNAH: moderate to quite a bit of difficulty              Current: No change, continues to report moderate difficulty with vacuuming, 01/04/2021    PLAN  [x]  Upgrade activities as tolerated     [x]  Continue plan of care  []  Update interventions per flow sheet       []  Discharge due to:_  []  Other:_      BRADEN Pelaez 1/8/2021  9:50 AM    Future Appointments   Date Time Provider Rosette Freeman   1/12/2021  9:00 AM DO MARIA DE JESUS Kowalski BS AMB   1/12/2021 10:30 AM Yu Ríos PTA [facility]   8/2/2021  9:20 AM Sabrina Severs, MD [facility] BS AMB

## 2021-01-12 ENCOUNTER — OFFICE VISIT (OUTPATIENT)
Dept: ORTHOPEDIC SURGERY | Age: 71
End: 2021-01-12
Payer: MEDICARE

## 2021-01-12 ENCOUNTER — HOSPITAL ENCOUNTER (OUTPATIENT)
Dept: OCCUPATIONAL MEDICINE | Age: 71
Discharge: HOME | End: 2021-01-12
Attending: FAMILY MEDICINE

## 2021-01-12 ENCOUNTER — HOSPITAL ENCOUNTER (OUTPATIENT)
Dept: PHYSICAL THERAPY | Age: 71
Discharge: HOME | End: 2021-01-12
Payer: MEDICARE

## 2021-01-12 VITALS
SYSTOLIC BLOOD PRESSURE: 135 MMHG | DIASTOLIC BLOOD PRESSURE: 97 MMHG | HEIGHT: 62 IN | BODY MASS INDEX: 30.99 KG/M2 | HEART RATE: 80 BPM | WEIGHT: 168.4 LBS | RESPIRATION RATE: 15 BRPM | TEMPERATURE: 98.7 F

## 2021-01-12 DIAGNOSIS — M75.41 SHOULDER IMPINGEMENT, RIGHT: Primary | ICD-10-CM

## 2021-01-12 DIAGNOSIS — M65.9 FLEXOR CARPI RADIALIS TENOSYNOVITIS: ICD-10-CM

## 2021-01-12 DIAGNOSIS — M75.41 SHOULDER IMPINGEMENT, RIGHT: ICD-10-CM

## 2021-01-12 PROCEDURE — G8755 DIAS BP > OR = 90: HCPCS | Performed by: FAMILY MEDICINE

## 2021-01-12 PROCEDURE — G8752 SYS BP LESS 140: HCPCS | Performed by: FAMILY MEDICINE

## 2021-01-12 PROCEDURE — G8399 PT W/DXA RESULTS DOCUMENT: HCPCS | Performed by: FAMILY MEDICINE

## 2021-01-12 PROCEDURE — G8417 CALC BMI ABV UP PARAM F/U: HCPCS | Performed by: FAMILY MEDICINE

## 2021-01-12 PROCEDURE — 97140 MANUAL THERAPY 1/> REGIONS: CPT

## 2021-01-12 PROCEDURE — 3017F COLORECTAL CA SCREEN DOC REV: CPT | Performed by: FAMILY MEDICINE

## 2021-01-12 PROCEDURE — G9899 SCRN MAM PERF RSLTS DOC: HCPCS | Performed by: FAMILY MEDICINE

## 2021-01-12 PROCEDURE — 1101F PT FALLS ASSESS-DOCD LE1/YR: CPT | Performed by: FAMILY MEDICINE

## 2021-01-12 PROCEDURE — G8432 DEP SCR NOT DOC, RNG: HCPCS | Performed by: FAMILY MEDICINE

## 2021-01-12 PROCEDURE — G8536 NO DOC ELDER MAL SCRN: HCPCS | Performed by: FAMILY MEDICINE

## 2021-01-12 PROCEDURE — 97530 THERAPEUTIC ACTIVITIES: CPT

## 2021-01-12 PROCEDURE — 99213 OFFICE O/P EST LOW 20 MIN: CPT | Performed by: FAMILY MEDICINE

## 2021-01-12 PROCEDURE — 1090F PRES/ABSN URINE INCON ASSESS: CPT | Performed by: FAMILY MEDICINE

## 2021-01-12 PROCEDURE — 20610 DRAIN/INJ JOINT/BURSA W/O US: CPT | Performed by: FAMILY MEDICINE

## 2021-01-12 PROCEDURE — 97110 THERAPEUTIC EXERCISES: CPT

## 2021-01-12 PROCEDURE — G8427 DOCREV CUR MEDS BY ELIG CLIN: HCPCS | Performed by: FAMILY MEDICINE

## 2021-01-12 RX ORDER — METHYLPREDNISOLONE ACETATE 40 MG/ML
40 INJECTION, SUSPENSION INTRA-ARTICULAR; INTRALESIONAL; INTRAMUSCULAR; SOFT TISSUE ONCE
Status: COMPLETED | OUTPATIENT
Start: 2021-01-12 | End: 2021-01-12

## 2021-01-12 RX ADMIN — METHYLPREDNISOLONE ACETATE 40 MG: 40 INJECTION, SUSPENSION INTRA-ARTICULAR; INTRALESIONAL; INTRAMUSCULAR; SOFT TISSUE at 09:57

## 2021-01-12 NOTE — PROGRESS NOTES
Pt reports having MRI several years ago and dx w/ RC tear [facility]). Was tx w/ injection that was beneficial for 9 years. Pt is currently being seen by Dr. Rasheeda Sheikh for back and is doing PT. Pt has also seen Dr. Ellis Loo following fall in FEB2020 was tx for fx, but is still having Pt. Pt request wrist evaluation as well. Pt reports having to take care of her  as well as two sons w/ muscular dystrophy.  Pt enjoys dancing and will sometimes do for mov't/exercise

## 2021-01-12 NOTE — LETTER
NAME: Nae Shields : 1950 MRN: [id number] CONSENT FOR TREATMENT/PROCEDURE I authorize Los Ramos DO at [place] [address] and [facility] to perform the medical treatment and/or procedure(s) described below:  
 
Description of Medical Treatment and/or Procedure(s): (Specify number of treatments or procedures if repeated treatment is recommended) 
 
_____________inject steroid into right shoulder subacromial_________________________ I understand my provider may be assisted with significant procedural tasks by other qualified medical practitioners, who may perform important parts of the treatment/procedure(s) or assist with the administration of analgesia (pain-relieving medications) as necessary for my treatment/procedure(s). These practitioners will only perform tasks within the scope of their licensure and practice, as determined under [place] law and any other applicable regulation(s). Name and Credentials of Practitioners Who May Assist with My Treatment/Procedure(s):  
 
______________________________________________________________________ I understand that a medical company representative may be present during my treatment/procedure(s) to observe and provide verbal, technical advice to my provider. I consent to the participation of this representative in my treatment/procedure(s). My provider has explained that unforeseen conditions may be identified during the performance of my treatment/procedure(s) that necessitate an extension of the original treatment/procedure(s) or the performance of procedures other than those identified above. I consent to the performance of additional treatment/procedure(s) by my provider and the qualified medical practitioners assisting my provider as deemed necessary by my provider for treatment of my medical condition(s). I understand that certain complications may arise during the use of analgesia during my treatment/procedure(s) that may include, but are not limited to, decreased/altered awareness, respiratory problems, drug reactions, paralysis, brain damage, or possibly, death. I understand that the analgesia required for the performance of my treatment/procedure(s) involves additional risks beyond those of the treatment/procedure(s) to be performed, and authorize the use of analgesia by my provider as deemed necessary for the control of pain during my treatment/procedure(s). I understand that my provider may need to change the type of analgesia or medications used, possibly without explanation to me, and I consent to any such changes as deemed necessary by my provider for treatment of my medical condition(s). I understand that there are risks of infection and other unexpected complications associated with any medical or surgical treatment/procedure including the treatment/procedure(s) listed above or other treatment/procedure(s) necessitated by my medical condition, and that such complications may occur in the absence of any negligence on the part of my healthcare providers. My provider has explained to my satisfaction my medical condition and the specific treatment/procedure(s) recommended and identified above. I have been given an opportunity to ask and have answered to my satisfaction questions about: (CONTINUED PAGE 2) NAME: Suzy Brice : 1950 MRN: [id number]  The nature and extent of the treatment/procedure(s) to be performed;  The benefit of treatment, and the risks associated with not having the recommended treatment/procedure(s);  The risks and possible complications associated with having the treatment, including those which, even though unlikely to occur, may involve serious consequences;  
 Analgesia and alternative forms of analgesia;  
  Alternative procedures and methods of treatment, and the risk associated with each;  
 The expected consequences of the treatment/procedure(s) on my future health. I understand that no assurance can be given that the treatment/procedure(s) performed will be a success, and no guarantee or warranty of success for the treatment/procedure(s) has been given to me by my provider. I consent to the disposal by the examining Pathologist of any tissue removed during my treatment/procedure(s) in accordance with the receiving hospitals or laboratorys policy and any associated regulations specific to such disposal.  
 
I DO__x___  DO NOT____ consent to other health care personnel observing my treatment/procedure(s) for the purpose of medical education or other specified purposes as may be explained by my provider. I DO_______ DO NOT__x__ consent to photography or videotaping of all or any part of my treatment/procedure(s) for medical and/or educational purposes. I understand that my identity will not be revealed in any photographs, videos, or accompanying explanations should these images be used by my healthcare providers. I certify that I have read and fully understand the above Consent for Treatment/Procedure and that all blanks were completed before I signed this consent.  
 
__________Clara A Gravelle_________                      _______________ Print Name of Patient or Legal Representative        Relationship to Patient (if not self) X_______________________________            __________________________ Signature of Patient (or legal representative)  Witness to signature    
 
                       __1/12/2021___/_________AM/PM 
                                                                   Date/Time (If patient is a minor or is unable to sign, complete the following) Patient is a minor, ________ years of age, or is unable to sign due to:______________________ I have explained the nature, purpose and anticipated benefits as well as any possible alternative methods of treatment, the known risks that are involved, and the possibility of complications of the proposed procedure(s) to the patient or patients legal representative. I have provided the patient or legal representative with an opportunity to ask and have answered to their satisfaction any questions about the proposed treatment/procedure(s) and alternative methods of treatment.   
 
Provider Signature:___________________  Date:__1/12/2021__Time:_____________AM/PM

## 2021-01-12 NOTE — PROGRESS NOTES
AVS reviewed: YES  HEP: AT reviewed, Pt declined demo, confirmed able to follow pictures  Resources Provided: [place]  Patient questions/concerns answered: NO. Pt denied questions/concerns  Patient verbalized understanding of treatment plan: YES

## 2021-01-12 NOTE — PROGRESS NOTES
HISTORY OF PRESENT ILLNESS    Farnaz A Magi 1950 is a 72y.o. year old female comes in today as new patient for: right shoulder pain    Patients symptoms have been present for 8-9 years. Pain level 4/10 anterior. It has improved with injection 8-9 years ago. Patient has tried:  voltaren gel and tylenol/aleve with benefit. It is described as pain in shoulder was Dx RC tear but not surgical candidate. Is currently Tx Dr. Jerry Cochran for disc issues and recently had epidural.  Also using mobic w/ benefit. Also has pain right wrist for several months after Fx but unable to be casted as delay in Tx. Brace helps some. IMAGING: XR right shoulder pending    MRI right wrist 2/3/2020  IMPRESSION:  1. Subacute mildly comminuted nondisplaced intra-articular distal radial  metaphysis fracture. 2.  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 Small ganglion  cyst at the volar aspect of the triscaphe joint. 3.  Mild to moderate extensor carpi ulnaris tendinosis with suspected short  segment longitudinal split tear just distal to the ulnar styloid. Trace abductor  pollicis longus, extensor pollicis brevis, extensor carpi radialis  longus/brevis, and extensor pollicis longus tenosynovitis. 4.  Mild cystic change at the ulnar base of the lunate which can be seen with  ulnar impaction syndrome; however the TFCC is unremarkable in appearance.     Past Surgical History:   Procedure Laterality Date    HX CATARACT REMOVAL      HX LEEP PROCEDURE      W[place] APPENDECTOMY       Social History     Socioeconomic History    Marital status:      Spouse name: Not on file    Number of children: Not on file    Years of education: Not on file    Highest education level: Not on file   Tobacco Use    Smoking status: Current Some Day Smoker    Smokeless tobacco: Never Used   Substance and Sexual Activity    Alcohol use: Not Currently    Drug use: No      Current Outpatient Medications   Medication Sig Dispense Refill    BIOTIN PO Take  by mouth.  multivitamin (ONE A DAY) tablet Take 1 Tab by mouth daily.  mirabegron ER (Myrbetriq) 50 mg ER tablet Take 1 Tab by mouth daily. 90 Tab 2    Synthroid 150 mcg tablet       aspirin delayed-release 81 mg tablet Take  by mouth daily.  coenzyme q10 (Co Q-10) 10 mg cap Take  by mouth.  telmisartan (MICARDIS) 40 mg tablet       cetirizine (ZYRTEC) 10 mg tablet       pitavastatin calcium (LIVALO) 2 mg tablet Take  by mouth daily.  pantoprazole (PROTONIX) 40 mg tablet Take  by mouth.  REFRESH TEARS 0.5 % drop ophthalmic solution       meloxicam (MOBIC) 7.5 mg tablet       omega-3 fatty acids-vitamin e 1,000 mg cap Take 1 Cap by mouth two (2) times a day.  ergocalciferol (ERGOCALCIFEROL) 50,000 unit capsule Take 50,000 Units by mouth every seven (7) days.  levothyroxine (SYNTHROID) 112 mcg tablet Take 150 mcg by mouth. Past Medical History:   Diagnosis Date    Acquired ventricular hypertrophy     Allergic rhinitis     Bladder incontinence     Cardiac echocardiogram 05/11/2016    On-Sight Ultrasound:  EF 48%. No RWMA on segments seen. Mild conc LVH. Gr 1 DDfx. Normal RVSP. Mild TR.  Cardiac nuclear imaging test, low risk 06/15/2016    Low risk. No ischemia or prior infarction. No RWMA. EF 71%. Neg EKG on maximal EST. Ex time 8 min 45 sec.  Cardiomyopathy ([address]     Cardiovascular disease     Esophageal reflux     Essential hypertension     Hyperlipidemia     Numerous moles     Sleep apnea     Thyroid disease     Vitamin D deficiency      Family History   Problem Relation Age of Onset    Heart Attack Brother     High Cholesterol Father     Arthritis-osteo Father     Breast Cancer Sister          ROS:  No numb, tingle, swell.       Objective:  BP (!) 135/97   Pulse 80   Temp 98.7 °F (37.1 °C)   Resp 15   Ht 5' 2\" (1.575 m)   Wt 168 lb 6.4 oz (76.4 kg)   BMI 30.80 kg/m²   NEURO:  Sensation intact light touch B/L upper extremities. right hand dominant. DTRs normal biceps and triceps   M/S:  Shoulder ROM Normal  bilaterally. Spurling's negative bilaterally  right Shoulder:  Empty can positive External rotation negative. Internal rotation negative. [place] negative. SLAP negative. Load and Shift +1 Anterior, 1 Posterior. Strength +5/5 bilaterally upper extremities. Crossover test negative. Negative atrophy bilaterally. Negative TTP at [facility] joint. Apprehension test negative. Olivares-John Test positive. Yergason's test negative. Speed's test negative. RIGHT WRIST: TTP flexor carpi radialis worse resisted wrist flexion      Assessment/Plan:     ICD-10-CM ICD-9-CM    1. Shoulder impingement, right  M75.41 726.2 XR SHOULDER RT AP/LAT MIN 2 V      DRAIN/INJECT LARGE JOINT/BURSA   2. Flexor carpi radialis tenosynovitis  M65.9 727.05        Patient (or guardian if minor) verbalizes understanding of evaluation and plan. Will inject subacromial and start HEP for RC with stretch for wrist flexors and voltaren gel/mobic from prior. RTC 6 weeks.

## 2021-01-12 NOTE — LETTER
1/12/2021 Patient: Joseph Bowles YOB: 1950 Date of Visit: 1/12/2021 Krys Guerra MD 
[address] [address] Via Fax: [phone] Ryan Chau MD 
[address] [place], [place] [address] Via In [employer] Dear MD Ryan Rivera MD, Thank you for referring Ms. Sylwia Bolaños to [place] [address]. for evaluation. My notes for this consultation are attached. If you have questions, please do not hesitate to call me. I look forward to following your patient along with you.  
 
 
Sincerely, 
 
Akbar Chi, DO

## 2021-01-12 NOTE — PROCEDURES
PROCEDURE NOTE:  Time out: 953am  * Patient was identified by name and date of birth   * Agreement on procedure being performed was verified  * Risks and Benefits explained to the patient  * Procedure site verified and marked as necessary  * Patient was positioned for comfort  * Consent was signed and verified. Risks/benefits including but not limited to bleeding, infection, and scarring discussed and Pt wishes to proceed with procedure. The area was prepped with betadine. Ethyl chloride spray was used, under sterile technique and without ultrasound guidance 1cc of 40mg/cc methylprednisolone acetate and 4cc lidocaine were injected into right subacromial space. Sterile gauze used to clean the area. Blood loss minimal.  Noticed improvement in pain Sx within 5 minutes (now rated 1/10). Tolerated procedure well. Discussed possible signs/Sx of infxn, and advised to seek care if concerned.

## 2021-01-12 NOTE — PATIENT INSTRUCTIONS
Perform home exercises for shoulder daily, starting Friday. Perform stretches for wrist daily, use medication as prescribed, and return to the office in about 6 weeks. Search YouTube for my channel: 
 
Dr. Brenden Brush Rotator cuff Carpal Tunnel

## 2021-01-12 NOTE — PROGRESS NOTES
PT DAILY TREATMENT NOTE     Patient Name: Nathaly Albright  Date:2021  : 1950  [x]  Patient  Verified  [place]or: [place] MEDICARE / Plan: [place] MEDICARE PART A & B / Product Type: Medicare /    In time:10:30  Out time:11:25  Total Treatment Time (min): 55  Visit #: 4 of 8    Medicare/BCBS Only   Total Timed Codes (min):   1:1 Treatment Time:         Treatment Area: Low back pain [M54.5]    SUBJECTIVE  Pain Level (0-10 scale): 5  Any medication changes, allergies to medications, adverse drug reactions, diagnosis change, or new procedure performed?: [x] No    [] Yes (see summary sheet for update)  Subjective functional status/changes:   [] No changes reported  \"I was in a lot of pain last night. \"    OBJECTIVE    Modality rationale: decrease edema, decrease inflammation, decrease pain, increase tissue extensibility and increase muscle contraction/control to improve the patients ability to perform ADL    Min Type Additional Details    [] Estim:  []Unatt       []IFC  []Premod                        []Other:  []w/ice   []w/heat  Position:  Location:    [] Estim: []Att    []TENS instruct  []NMES                    []Other:  []w/US   []w/ice   []w/heat  Position:  Location:    []  Traction: [] Cervical       []Lumbar                       [] Prone          []Supine                       []Intermittent   []Continuous Lbs:  [] before manual  [] after manual    []  Ultrasound: []Continuous   [] Pulsed                           []1MHz   []3MHz W/cm2:  Location:    []  Iontophoresis with dexamethasone         Location: [] Take home patch   [] In clinic   10 [x]  Ice     []  heat  []  Ice massage  []  Laser   []  Anodyne Position:prone  Location:(B) glute region    []  Laser with stim  []  Other:  Position:  Location:    []  Vasopneumatic Device Pressure:       [] lo [] med [] hi   Temperature: [] lo [] med [] hi   [x] Skin assessment post-treatment:  [x]intact []redness- no adverse reaction    []redness  adverse reaction:      min []Eval                  []Re-Eval       27 min Therapeutic Exercise:  [x] See flow sheet :   Rationale: increase ROM and increase strength to improve the patients ability to perform ADL     8 min Therapeutic Activity:  []  See flow sheet :   Rationale: increase ROM, increase strength and improve coordination  to improve the patients ability to perform ADL      min Neuromuscular Re-education:  []  See flow sheet :   Rationale: increase ROM, increase strength, improve coordination, improve balance and increase proprioception  to improve the patients ability to perform ADL     10 min Manual Therapy:  Checked alignment: TPR left glute region/piriformis supine   The manual therapy interventions were performed at a separate and distinct time from the therapeutic activities interventions. Rationale: decrease pain, increase ROM, increase tissue extensibility, decrease edema , decrease trigger points and increase postural awareness to perform ADL      min Gait Training:  ___ feet with ___ device on level surfaces with ___ level of assist   Rationale: With   [x] TE   [x] TA   [] neuro   [] other: Patient Education: [x] Review HEP    [] Progressed/Changed HEP based on:   [] positioning   [] body mechanics   [] transfers   [] heat/ice application    [x] other: piriformis self mob     Other Objective/Functional Measures:      Pain Level (0-10 scale) post treatment: 1    ASSESSMENT/Changes in Function: Pt was TTP at left glute region. Left innominate was posterior rotated. Pt was able to perform each there ex with mod pain level today. Pt was questionable on why the pain come and goes. Therapist discussed with pt that depending on how she is moving during the day or if she is doing too much can trigger the pain. Discussed with pt on performing piriformis self mob at home with tennis ball to relief tightness and pain.     Patient will continue to benefit from skilled PT services to address functional mobility deficits, address ROM deficits, address strength deficits, analyze and address soft tissue restrictions, analyze and cue movement patterns, analyze and modify body mechanics/ergonomics, assess and modify postural abnormalities and instruct in home and community integration to attain remaining goals. []  See Plan of Care  []  See progress note/recertification  []  See Discharge Summary         Progress towards goals / Updated goals:  1. Patient will be able to lift 40# from the ground without increased pain or difficulty.              PN: unable to lift more than 10lbs   2. Patient will be able to vacuum without difficulty.                 MW: moderate to quite a bit of difficulty              Current: No change, continues to report moderate difficulty with vacuuming, 01/04/2021       PLAN  []  Upgrade activities as tolerated     []  Continue plan of care  []  Update interventions per flow sheet       []  Discharge due to:_  []  Other:_      Michelle Mcdowell, PTA 1/12/2021  10:45 AM    Future Appointments   Date Time Provider Rosette Freeman   2/23/2021  9:20 AM DO MARIA DE JESUS Dumont BS AMB   8/2/2021  9:20 AM Ria Hernandez MD [facility] BS AMB

## 2021-01-13 ENCOUNTER — TELEPHONE (OUTPATIENT)
Dept: ORTHOPEDIC SURGERY | Age: 71
End: 2021-01-13

## 2021-01-13 NOTE — TELEPHONE ENCOUNTER
Patient is asking if her aching in the arm after receiving an injection is normal.  She states she could barely sleep last night. She is asking for a return call, but will be at the dentist at 10 am for about 2 hours or so. [phone] Patient

## 2021-01-13 NOTE — TELEPHONE ENCOUNTER
Contacted pt at her home number. Informed pt that it was very common to have pain and achiness for the first 24 hours and should start feeling relief with in the next few days. Pt states that her pain has actually started to decrease now since calling this morning.

## 2021-01-15 ENCOUNTER — HOSPITAL ENCOUNTER (OUTPATIENT)
Dept: PHYSICAL THERAPY | Age: 71
Discharge: HOME | End: 2021-01-15
Payer: MEDICARE

## 2021-01-15 PROCEDURE — 97110 THERAPEUTIC EXERCISES: CPT

## 2021-01-15 PROCEDURE — 97112 NEUROMUSCULAR REEDUCATION: CPT

## 2021-01-15 NOTE — PROGRESS NOTES
PT DAILY TREATMENT NOTE     Patient Name: Nae Shields  Date:1/15/2021  : 1950  [x]  Patient  Verified  Payor: [place] MEDICARE / Plan: [place] MEDICARE PART A & B / Product Type: Medicare /    In time:1028  Out time:1116  Total Treatment Time (min): 48  Visit #: 5 of 8    Medicare/BCBS Only   Total Timed Codes (min):  38 1:1 Treatment Time:  38       Treatment Area: Low back pain [M54.5]    SUBJECTIVE  Pain Level (0-10 scale): 0  Any medication changes, allergies to medications, adverse drug reactions, diagnosis change, or new procedure performed?: [x] No    [] Yes (see summary sheet for update)  Subjective functional status/changes:   [] No changes reported  Patient reports Todd Meraz worked on her last visit and that really helped. She hasn't had any pain since then.     OBJECTIVE    Modality rationale: decrease inflammation and decrease pain to improve the patients ability to perform ADLs   Min Type Additional Details    [] Estim:  []Unatt       []IFC  []Premod                        []Other:  []w/ice   []w/heat  Position:  Location:    [] Estim: []Att    []TENS instruct  []NMES                    []Other:  []w/US   []w/ice   []w/heat  Position:  Location:    []  Traction: [] Cervical       []Lumbar                       [] Prone          []Supine                       []Intermittent   []Continuous Lbs:  [] before manual  [] after manual    []  Ultrasound: []Continuous   [] Pulsed                           []1MHz   []3MHz W/cm2:  Location:    []  Iontophoresis with dexamethasone         Location: [] Take home patch   [] In clinic   10 [x]  Ice     []  heat  []  Ice massage  []  Laser   []  Anodyne Position:prone  Location: bilateral buttocks    []  Laser with stim  []  Other:  Position:  Location:    []  Vasopneumatic Device Pressure:       [] lo [] med [] hi   Temperature: [] lo [] med [] hi   [x] Skin assessment post-treatment:  [x]intact []redness- no adverse reaction    []redness  adverse reaction: 30 min Therapeutic Exercise:  [x] See flow sheet :   Rationale: increase ROM and increase strength to improve the patients ability to increase activity tolerance     18 min Neuromuscular Re-education:  [x]  See flow sheet : prone sequence, glute activation, core activation   Rationale: increase strength, improve coordination, improve balance and increase proprioception  to improve the patients ability to increase standing and walking tolerance          With   [] TE   [] TA   [] neuro   [] other: Patient Education: [x] Review HEP    [] Progressed/Changed HEP based on:   [] positioning   [] body mechanics   [] transfers   [] heat/ice application    [] other:      Other Objective/Functional Measures: patient had shoulder pain with some exercises involving UE     Pain Level (0-10 scale) post treatment: 1.5    ASSESSMENT/Changes in Function: Ms. Beba Dsouza had no pain upon arrival to therapy reporting relief after manual therapy performed last session. Progressed resistance of some exercises per flow sheet, which she tolerated without significant exacerbation of symptoms. She had some increased shoulder pain with performance of supine core training with SB. Discussed beginning lifting mechanics next session. Patient will continue to benefit from skilled PT services to modify and progress therapeutic interventions, address functional mobility deficits, address ROM deficits, address strength deficits, analyze and address soft tissue restrictions, analyze and cue movement patterns, analyze and modify body mechanics/ergonomics, assess and modify postural abnormalities, address imbalance/dizziness and instruct in home and community integration to attain remaining goals. []  See Plan of Care  []  See progress note/recertification  []  See Discharge Summary         Progress towards goals / Updated goals:  1. Patient will be able to lift 40# from the ground without increased pain or difficulty.                YM: unable to lift more than 10lbs    Current: patient reports she still limits her lifting to 10lbs 1/15/21  2. Patient will be able to vacuum without difficulty.                 DU: moderate to quite a bit of difficulty              Current: continues to have difficulty with vacuuming, partially due to right wrist and shoulder pain, but states she mopped floor yesterday with no back pain 1/15/21    PLAN  [x]  Upgrade activities as tolerated     [x]  Continue plan of care  []  Update interventions per flow sheet       []  Discharge due to:_  []  Other:_      Benita Ruby PTA 1/15/2021  9:20 AM    Future Appointments   Date Time Provider Rosette Lydia   1/15/2021 10:30 AM Domonique Morse PTA [facility]   1/18/2021  2:15 PM Yun Jones PT [facility]   1/20/2021 10:30 AM Nicola Rizo [facility]   1/22/2021  9:45 AM SHIRLEY PiñaT [facility]   2/23/2021  9:20 AM DO MARIA DE JESUS Prasad BS AMB   8/2/2021  9:20 AM Kathleen Prabhakar MD [facility] BS AMB

## 2021-01-18 ENCOUNTER — APPOINTMENT (OUTPATIENT)
Dept: PHYSICAL THERAPY | Age: 71
End: 2021-01-18
Payer: MEDICARE

## 2021-01-20 ENCOUNTER — HOSPITAL ENCOUNTER (OUTPATIENT)
Dept: PHYSICAL THERAPY | Age: 71
Discharge: HOME | End: 2021-01-20
Payer: MEDICARE

## 2021-01-20 PROCEDURE — 97110 THERAPEUTIC EXERCISES: CPT

## 2021-01-20 PROCEDURE — 97112 NEUROMUSCULAR REEDUCATION: CPT

## 2021-01-20 PROCEDURE — 97140 MANUAL THERAPY 1/> REGIONS: CPT

## 2021-01-20 NOTE — PROGRESS NOTES
PT DAILY TREATMENT NOTE     Patient Name: Humberto Acevedo  Date:2021  : 1950  [x]  Patient  Verified  Payor: VA MEDICARE / Plan: VA MEDICARE PART A & B / Product Type: Medicare /    In time:7:40  Out time:8:34  Total Treatment Time (min): 49  Visit #: 5 of 8    Medicare/BCBS Only   Total Timed Codes (min):  39 1:1 Treatment Time:  39       Treatment Area: Low back pain [M54.5]    SUBJECTIVE  Pain Level (0-10 scale): 0  Any medication changes, allergies to medications, adverse drug reactions, diagnosis change, or new procedure performed?: [x] No    [] Yes (see summary sheet for update)  Subjective functional status/changes:   [] No changes reported  \"I dont like being seen by different therapist.\"    OBJECTIVE    Modality rationale: decrease edema, decrease inflammation, decrease pain, increase tissue extensibility and increase muscle contraction/control to improve the patients ability to perform ADL   Min Type Additional Details    [] Estim:  []Unatt       []IFC  []Premod                        []Other:  []w/ice   []w/heat  Position:  Location:    [] Estim: []Att    []TENS instruct  []NMES                    []Other:  []w/US   []w/ice   []w/heat  Position:  Location:    []  Traction: [] Cervical       []Lumbar                       [] Prone          []Supine                       []Intermittent   []Continuous Lbs:  [] before manual  [] after manual    []  Ultrasound: []Continuous   [] Pulsed                           []1MHz   []3MHz W/cm2:  Location:    []  Iontophoresis with dexamethasone         Location: [] Take home patch   [] In clinic   10 [x]  Ice     []  heat  []  Ice massage  []  Laser   []  Anodyne Position:prone  Location:(B) LSP/TSP    []  Laser with stim  []  Other:  Position:  Location:    []  Vasopneumatic Device Pressure:       [] lo [] med [] hi   Temperature: [] lo [] med [] hi   [x] Skin assessment post-treatment:  [x]intact []redness- no adverse reaction    []redness  adverse reaction:      min []Eval                  []Re-Eval       20 min Therapeutic Exercise:  [x] See flow sheet :   Rationale: increase ROM, increase strength and improve coordination to improve the patients ability to perform ADL      min Therapeutic Activity:  []  See flow sheet :   Rationale: increase ROM, increase strength and improve coordination  to improve the patients ability to perform ADL     11 min Neuromuscular Re-education:  []  See flow sheet :   Rationale: increase ROM, increase strength, improve coordination, improve balance and increase proprioception  to improve the patients ability to perform ADL     8 min Manual Therapy:  STM/DTM (B) TSP/upper LSP  Prone; checked alignment   The manual therapy interventions were performed at a separate and distinct time from the therapeutic activities interventions. Rationale: decrease pain, increase ROM, increase tissue extensibility, decrease edema , decrease trigger points and increase postural awareness to perform ADL      min Gait Training:  ___ feet with ___ device on level surfaces with ___ level of assist   Rationale: With   [x] TE   [] TA   [] neuro   [] other: Patient Education: [x] Review HEP    [] Progressed/Changed HEP based on:   [] positioning   [] body mechanics   [] transfers   [] heat/ice application    [] other:      Other Objective/Functional Measures:      Pain Level (0-10 scale) post treatment: 0    ASSESSMENT/Changes in Function: Pt experienced minimal pain at right shoulder. Pt was TTP at right ribs during manual. Pt stated I Use a C-pamp machine. Overall pt has shown fair response with PT. Pt is progressing towards achieving updated goals.     Patient will continue to benefit from skilled PT services to address functional mobility deficits, address ROM deficits, address strength deficits, analyze and address soft tissue restrictions, analyze and cue movement patterns, analyze and modify body mechanics/ergonomics, assess and modify postural abnormalities and instruct in home and community integration to attain remaining goals. [x]  See Plan of Care  []  See progress note/recertification  []  See Discharge Summary         Progress towards goals / Updated goals:  1. Patient will be able to lift 40# from the ground without increased pain or difficulty.              QN: unable to lift more than 10lbs               Current: patient reports she still limits her lifting to 10lbs 1/15/21  2. Patient will be able to vacuum without difficulty.                 HANNAH: moderate to quite a bit of difficulty              Current: continues to have difficulty with vacuuming, partially due to right wrist and shoulder pain, but states she mopped floor yesterday with no back pain 1/15/21       PLAN  [x]  Upgrade activities as tolerated     []  Continue plan of care  []  Update interventions per flow sheet       []  Discharge due to:_  [x]  Other:_  [address], PTA 1/20/2021  7:58 AM    Future Appointments   Date Time Provider Rosette Freeman   1/22/2021  7:30 AM Iwona Mckeon, PT [facility]   2/23/2021  9:20 AM DO MARIA DE JESUS Momin BS AMB   8/2/2021  9:20 AM Pete Munoz MD [facility] BS AMB

## 2021-01-22 ENCOUNTER — HOSPITAL ENCOUNTER (OUTPATIENT)
Dept: PHYSICAL THERAPY | Age: 71
Discharge: HOME | End: 2021-01-22
Payer: MEDICARE

## 2021-01-22 PROCEDURE — 97140 MANUAL THERAPY 1/> REGIONS: CPT

## 2021-01-22 PROCEDURE — 97530 THERAPEUTIC ACTIVITIES: CPT

## 2021-01-22 PROCEDURE — 97110 THERAPEUTIC EXERCISES: CPT

## 2021-01-22 NOTE — PROGRESS NOTES
Physical Therapy Discharge Instructions      [facility] [address], [address] [place] [address], [facility] [address]  [phone] fax    Patient: Vivienne Corrigan  : 1950      Continue Home Exercise Program 1-2 times per day for 2 weeks, then decrease to 3-5 times per week      Continue with    [x] Ice  as needed  PRN   times per day     [x] Heat       Follow up with MD:     [x] Upon completion of therapy -March     [] As needed      Recommendations:     [x]   Return to activity with home program    []   Return to activity with the following modifications:       []Post Rehab Program    []Join Independent aquatic program     []Return to/join local gym    Additional Comments:      Haritha Hugo, PT 2021 7:40 AM

## 2021-01-22 NOTE — PROGRESS NOTES
PT DISCHARGE DAILY NOTE AND [id number]    Date:2021  Patient name: Maira Ozuna Start of Care: 20   Referral source: Cheri Tovar MD : 1950   Medical/Treatment Diagnosis: Low back pain [M54.5] Onset Date:2020     Prior Hospitalization: see medical history Provider#: [id number]   Medications: Verified on Patient Summary List     Comorbidities: HTN, weight change up recently   Prior Level of Function: I all areas of activities and ADLs, no AD use, gardened. Drove, tolerated household and community activiites, walked 2 miles a day                       Visits from [facility] Care: 17    Missed Visits: 1    Reporting Period : 20 to 21    [x]  Patient  Verified  Payor: [place] MEDICARE / Plan: VA MEDICARE PART A & B / Product Type: Medicare /    In time:730  Out time:825  Total Treatment Time (min): 55  Visit #: 6 of 8    Medicare/BCBS Only   Total Timed Codes (min):  45 1:1 Treatment Time:  45       SUBJECTIVE  Pain Level (0-10 scale): 0  Any medication changes, allergies to medications, adverse drug reactions, diagnosis change, or new procedure performed?: [x] No    [] Yes (see summary sheet for update)  Subjective functional status/changes:   [] No changes reported  \"I am doing so much better than I was. \"    OBJECTIVE    Modality rationale: decrease pain and increase tissue extensibility to improve the patients ability to tolerate ADLS and activities   Min Type Additional Details    [] Estim:  []Unatt       []IFC  []Premod                        []Other:  []w/ice   []w/heat  Position:  Location:    [] Estim: []Att    []TENS instruct  []NMES                    []Other:  []w/US   []w/ice   []w/heat  Position:  Location:    []  Traction: [] Cervical       []Lumbar                       [] Prone          []Supine                       []Intermittent   []Continuous Lbs:  [] before manual  [] after manual    []  Ultrasound: []Continuous   [] Pulsed                           []1MHz []3MHz W/cm2:  Location:    []  Iontophoresis with dexamethasone         Location: [] Take home patch   [] In clinic   10 [x]  Ice post     []  heat  []  Ice massage  []  Laser   []  Anodyne Position:prone  Location:B LS/gluts    []  Laser with stim  []  Other:  Position:  Location:    []  Vasopneumatic Device Pressure:       [] lo [] med [] hi   Temperature: [] lo [] med [] hi   [] Skin assessment post-treatment:  []intact []redness- no adverse reaction    []redness  adverse reaction:          20 min Therapeutic Exercise:  [x] See flow sheet :   Rationale: increase ROM, increase strength and improve coordination to improve the patients ability to tolerate ADLs and activities    8 min Therapeutic Activity:  [x]  See flow sheet :   Rationale: increase ROM, increase strength and improve coordination  to improve the patients ability to tolerate ADLS and activities     9 min Neuromuscular Re-education:  [x]  See flow sheet :   Rationale: increase ROM, increase strength and improve coordination  to improve the patients ability to tolerate ADLs and activities    8 min Manual Therapy:  Tsp Lsp PA mobs in prone with TPR left piriformis    Rationale: decrease pain, increase ROM and increase tissue extensibility to tolerate ADLs and activities             With   [x] TE   [x] TA   [x] neuro   [] other: Patient Education: [x] Review HEP    [] Progressed/Changed HEP based on:   [] positioning   [] body mechanics   [] transfers   [] heat/ice application    [x] other: FOTO, POC, DC instructions     Other Objective/Functional Measures: TM 2.3 mph 6 minutes for a distance of      Pain Level (0-10 scale) post treatment: 0    Summary of Care:  Goal:Patient will be able to lift 40# from the ground without increased pain or difficulty. Status at last note/certification:last MD note  Status at discharge: not met, 10# per MD, 5X stoop and lift no increase pain     Goal:Patient will be able to vacuum without difficulty.   Status at last note/certification:last MD note  Status at discharge: met       ASSESSMENT/Changes in Function: patient has made significant gains in pain reduction and tolerance to exercises. She is ready for self management and will follow up with her MD as needed. She does have residual STC and trigger point in the left piriformis. Reviewed piriformis stretch and she reports compliance also with golf ball self mob.      Thank you for this referral!     PLAN  [x]Discontinue therapy: [x]Patient has reached or is progressing toward set goals      []Patient is non-compliant or has abdicated      []Due to lack of appreciable progress towards set goals    Ethel Bae, PT 1/22/2021  7:37 AM

## 2021-02-23 ENCOUNTER — OFFICE VISIT (OUTPATIENT)
Dept: ORTHOPEDIC SURGERY | Age: 71
End: 2021-02-23
Payer: MEDICARE

## 2021-02-23 VITALS
HEIGHT: 62 IN | RESPIRATION RATE: 14 BRPM | TEMPERATURE: 97.1 F | BODY MASS INDEX: 30.73 KG/M2 | WEIGHT: 167 LBS | SYSTOLIC BLOOD PRESSURE: 136 MMHG | HEART RATE: 82 BPM | DIASTOLIC BLOOD PRESSURE: 82 MMHG

## 2021-02-23 DIAGNOSIS — M19.011 OSTEOARTHRITIS OF RIGHT AC (ACROMIOCLAVICULAR) JOINT: ICD-10-CM

## 2021-02-23 DIAGNOSIS — M65.9 FLEXOR CARPI RADIALIS TENOSYNOVITIS: ICD-10-CM

## 2021-02-23 DIAGNOSIS — M75.41 SHOULDER IMPINGEMENT, RIGHT: ICD-10-CM

## 2021-02-23 DIAGNOSIS — M75.41 SHOULDER IMPINGEMENT, RIGHT: Primary | ICD-10-CM

## 2021-02-23 PROCEDURE — G8536 NO DOC ELDER MAL SCRN: HCPCS | Performed by: FAMILY MEDICINE

## 2021-02-23 PROCEDURE — 1101F PT FALLS ASSESS-DOCD LE1/YR: CPT | Performed by: FAMILY MEDICINE

## 2021-02-23 PROCEDURE — G8427 DOCREV CUR MEDS BY ELIG CLIN: HCPCS | Performed by: FAMILY MEDICINE

## 2021-02-23 PROCEDURE — 99214 OFFICE O/P EST MOD 30 MIN: CPT | Performed by: FAMILY MEDICINE

## 2021-02-23 PROCEDURE — G8417 CALC BMI ABV UP PARAM F/U: HCPCS | Performed by: FAMILY MEDICINE

## 2021-02-23 PROCEDURE — G9899 SCRN MAM PERF RSLTS DOC: HCPCS | Performed by: FAMILY MEDICINE

## 2021-02-23 PROCEDURE — G8399 PT W/DXA RESULTS DOCUMENT: HCPCS | Performed by: FAMILY MEDICINE

## 2021-02-23 PROCEDURE — G8754 DIAS BP LESS 90: HCPCS | Performed by: FAMILY MEDICINE

## 2021-02-23 PROCEDURE — 3017F COLORECTAL CA SCREEN DOC REV: CPT | Performed by: FAMILY MEDICINE

## 2021-02-23 PROCEDURE — G8432 DEP SCR NOT DOC, RNG: HCPCS | Performed by: FAMILY MEDICINE

## 2021-02-23 PROCEDURE — G8752 SYS BP LESS 140: HCPCS | Performed by: FAMILY MEDICINE

## 2021-02-23 PROCEDURE — 1090F PRES/ABSN URINE INCON ASSESS: CPT | Performed by: FAMILY MEDICINE

## 2021-02-23 RX ORDER — MELOXICAM 7.5 MG/1
7.5 TABLET ORAL DAILY
Qty: 90 TAB | Refills: 0 | Status: SHIPPED | OUTPATIENT
Start: 2021-02-23 | End: 2022-01-25 | Stop reason: SDUPTHER

## 2021-02-23 NOTE — PROGRESS NOTES
HISTORY OF PRESENT ILLNESS    Farnaz Baker 1950 is a 72y.o. year old female comes in today to be evaluated and treated for: right shoulder pain    Since last appt has noticed pain in shoulder good w/ injection for a while but bad pain with certain motions now. Pain level 4/10. Using tylenol/mobic with benefit. Had simone doing PT for back not shoulder. Pain most in front and will lock. Did not do HEP for shoulder but did for wrist and helped. Voltaren gel helps wrist pain. IMAGING: XR right shoulder 1/12/2021  IMPRESSION:  No acute osseous findings. Acromiohumeral findings suggest chronic rotator cuff  pathology. Mild AC joint degenerative change. Past Surgical History:   Procedure Laterality Date    HX CATARACT REMOVAL      HX LEEP PROCEDURE      OR APPENDECTOMY       Social History     Socioeconomic History    Marital status:      Spouse name: Not on file    Number of children: Not on file    Years of education: Not on file    Highest education level: Not on file   Tobacco Use    Smoking status: Current Some Day Smoker    Smokeless tobacco: Never Used   Substance and Sexual Activity    Alcohol use: Not Currently    Drug use: No     Current Outpatient Medications   Medication Sig Dispense Refill    BIOTIN PO Take  by mouth.  multivitamin (ONE A DAY) tablet Take 1 Tab by mouth daily.  mirabegron ER (Myrbetriq) 50 mg ER tablet Take 1 Tab by mouth daily. 90 Tab 2    Synthroid 150 mcg tablet       aspirin delayed-release 81 mg tablet Take  by mouth daily.  coenzyme q10 (Co Q-10) 10 mg cap Take  by mouth.  telmisartan (MICARDIS) 40 mg tablet       cetirizine (ZYRTEC) 10 mg tablet       pitavastatin calcium (LIVALO) 2 mg tablet Take  by mouth daily.  pantoprazole (PROTONIX) 40 mg tablet Take  by mouth.       REFRESH TEARS 0.5 % drop ophthalmic solution       meloxicam (MOBIC) 7.5 mg tablet       omega-3 fatty acids-vitamin e 1,000 mg cap Take 1 Cap by mouth two (2) times a day.  ergocalciferol (ERGOCALCIFEROL) 50,000 unit capsule Take 50,000 Units by mouth every seven (7) days.  levothyroxine (SYNTHROID) 112 mcg tablet Take 150 mcg by mouth. Past Medical History:   Diagnosis Date    Acquired ventricular hypertrophy     Allergic rhinitis     Bladder incontinence     Cardiac echocardiogram 05/11/2016    On-Sight Ultrasound:  EF 48%. No RWMA on segments seen. Mild conc LVH. Gr 1 DDfx. Normal RVSP. Mild TR.  Cardiac nuclear imaging test, low risk 06/15/2016    Low risk. No ischemia or prior infarction. No RWMA. EF 71%. Neg EKG on maximal EST. Ex time 8 min 45 sec.  Cardiomyopathy ([address]     Cardiovascular disease     Esophageal reflux     Essential hypertension     Hyperlipidemia     Numerous moles     Sleep apnea     Thyroid disease     Vitamin D deficiency      Family History   Problem Relation Age of Onset    Heart Attack Brother     High Cholesterol Father     Arthritis-osteo Father     Breast Cancer Sister        ROS:  No numb, tingle, swell. Objective:  /82   Pulse 82   Temp 97.1 °F (36.2 °C)   Resp 14   Ht 5' 2\" (1.575 m)   Wt 167 lb (75.8 kg)   BMI 30.54 kg/m²   NEURO:  Sensation intact light touch B/L upper extremities. right hand dominant. DTRs normal biceps and triceps   M/S:  Shoulder ROM Normal  bilaterally. Spurling's negative bilaterally  right Shoulder:  Empty can less positive External rotation negative. Internal rotation negative. [place] negative. SLAP negative. Load and Shift +1 Anterior, 1 Posterior. Strength +5/5 bilaterally upper extremities. Crossover test negative. Negative atrophy bilaterally. Positive TTP at [facility] joint. Apprehension test negative. Olivares-John Test less positive. Yergason's test negative. Speed's test negative. RIGHT WRIST: minimal TTP flexor carpi radialis    Assessment/Plan:     ICD-10-CM ICD-9-CM    1.  Shoulder impingement, right  M75.41 726.2 meloxicam (MOBIC) 7.5 mg tablet      MRI SHOULDER RT WO CONT   2. Osteoarthritis of right AC (acromioclavicular) joint  M19.011 715.91 meloxicam (MOBIC) 7.5 mg tablet      MRI SHOULDER RT WO CONT   3. Flexor carpi radialis tenosynovitis  M65.9 727.05 meloxicam (MOBIC) 7.5 mg tablet     Patient (or guardian if minor) verbalizes understanding of evaluation and plan. Will demo HEP for RC again and await MRI w/ mobic PRN. Continue current for wrist.  RTC after MRI.

## 2021-02-23 NOTE — PROGRESS NOTES
AVS reviewed: YES  HEP: AT demonstrated  Resources Provided: YES YouTube Videos  Patient questions/concerns answered: YES Pt had questions about HEP  Patient verbalized understanding of treatment plan: YES

## 2021-02-23 NOTE — PATIENT INSTRUCTIONS
Patient to get MRI completed . Follow up in office after MRI is done. You should receive call to schedule the MRI. If have not been called to schedule within a couple of days, please call [phone] to schedule. After MRI is scheduled, please call our office and schedule follow-up appointment for 2-3 days after the date of the MRI. Search YouTube for my channel: 
 
Dr. Rebecca Moncada Rotator cuff

## 2021-02-23 NOTE — PROGRESS NOTES
Lang Miller is a 77 y.o. female right handed unknown employment. Worker's Compensation and legal considerations: not known. Vitals:    03/02/20 0959   BP: (!) 153/103   Pulse: 81   Resp: 16   Temp: 97.5 °F (36.4 °C)   TempSrc: Oral   SpO2: 99%   Weight: 164 lb 9.6 oz (74.7 kg)   Height: 5' 2\" (1.575 m)   PainSc:   2   PainLoc: Wrist           Chief Complaint   Patient presents with    Wrist Pain     Right wrist pain        HPI: Patient comes in today for right upper extremity EMG and nerve conduction study follow-up. She was previously diagnosed with a nondisplaced right distal radius fracture. She reports she is still having some pain in the wrist however it is improved. Previous HPI: Patient comes in today for MRI follow-up after having wrist pain on the right side since December. She has been in a thumb spica brace since her last visit and she reports Improvement in her symptoms. Initial HPI: Patient comes in today with complaints of right hand pain and wrist pain after a fall. She reports the pain is persisted in her wrist as well as some tenderness in her ring finger with range of motion. Date of onset:  12/2019    Injury: Yes: Comment: fall    Prior Treatment:  No    Numbness/ Tingling: No    ROS: Review of Systems - General ROS: negative  Respiratory ROS: no cough, shortness of breath, or wheezing  Cardiovascular ROS: no chest pain or dyspnea on exertion  Musculoskeletal ROS: positive for - pain in finger - right and wrist - right  Neurological ROS: negative  Dermatological ROS: negative    Past Medical History:   Diagnosis Date    Acquired ventricular hypertrophy     Allergic rhinitis     Bladder incontinence     Cardiac echocardiogram 05/11/2016    On-Sight Ultrasound:  EF 48%. No RWMA on segments seen. Mild conc LVH. Gr 1 DDfx. Normal RVSP. Mild TR.  Cardiac nuclear imaging test, low risk 06/15/2016    Low risk. No ischemia or prior infarction. No RWMA. EF 71%.   Neg Sent in medication. Closing encounter    EKG on maximal EST. Ex time 8 min 45 sec.  Cardiomyopathy ([address]     Cardiovascular disease     Esophageal reflux     Essential hypertension     Hyperlipidemia     Numerous moles     Sleep apnea     Thyroid disease     Vitamin D deficiency        Past Surgical History:   Procedure Laterality Date    APPENDECTOMY      HX CATARACT REMOVAL      HX LEEP PROCEDURE         Current Outpatient Medications   Medication Sig Dispense Refill    telmisartan (MICARDIS) 40 mg tablet       mirabegron ER (MYRBETRIQ) 50 mg ER tablet Take 1 Tab by mouth daily. 90 Tab 3    cetirizine (ZYRTEC) 10 mg tablet       pitavastatin calcium (LIVALO) 2 mg tablet Take  by mouth daily.  pantoprazole (PROTONIX) 40 mg tablet Take  by mouth.  REFRESH TEARS 0.5 % drop ophthalmic solution       meloxicam (MOBIC) 7.5 mg tablet       REFRESH P.M. 57.3-42.5 % oint ophthalmic ointment       levothyroxine (SYNTHROID) 112 mcg tablet Take 150 mcg by mouth.  omega-3 fatty acids-vitamin e 1,000 mg cap Take 1 Cap by mouth two (2) times a day.  telmisartan-hydrochlorothiazide (MICARDIS HCT) 40-12.5 mg per tablet Take 1 Tab by mouth daily.  ergocalciferol (ERGOCALCIFEROL) 50,000 unit capsule Take 50,000 Units by mouth every seven (7) days. Allergies   Allergen Reactions    Septra [Sulfamethoprim] Other (comments)     Upset stomach, makes patient real sick         PE:     Right upper extremity: Sensation is grossly intact distally. Cap refill is brisk. Range of motion of the wrist is limited due to pain with terminal extension and flexion.     NCV & EMG Findings:  All nerve conduction studies (as indicated in the following tables) were within normal limits.       All examined muscles (as indicated in the following table) showed no evidence of electrical instability     INTERPRETATION  This was a normal nerve conduction and EMG study showing there to be no signs of neuropathy, myopathy, or radiculopathy in the nerves and muscles tested.      CLINICAL INTERPRETATION  There are no electrodiagnostic findings correlating with her symptoms. Imaging:     3/2/2020 plain films of the right wrist again does not show any illicit fracture or dislocation. The nondisplaced fracture appears to be completely healed. 2/2020 MRI Right Wrist  IMPRESSION:  1. Subacute mildly comminuted nondisplaced intra-articular distal radial  metaphysis fracture. 2.  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 Small ganglion  cyst at the volar aspect of the triscaphe joint. 3.  Mild to moderate extensor carpi ulnaris tendinosis with suspected short  segment longitudinal split tear just distal to the ulnar styloid. Trace abductor  pollicis longus, extensor pollicis brevis, extensor carpi radialis  longus/brevis, and extensor pollicis longus tenosynovitis. 4.  Mild cystic change at the ulnar base of the lunate which can be seen with  ulnar impaction syndrome; however the TFCC is unremarkable in appearance. Plain films of the right wrist and hand does not show any acute fracture or dislocation. There are only mild degenerative changes noted. These plain films are dated 1/27/2020        ICD-10-CM ICD-9-CM    1. Closed Colles' fracture of right radius, sequela S52.531S 905.2 AMB POC XRAY, WRIST; COMPLETE, 3+ VIE      REFERRAL TO OCCUPATIONAL THERAPY   2. Extensor tenosynovitis of right wrist M65.831 727.05 REFERRAL TO OCCUPATIONAL THERAPY   3. Digital mucinous cyst of finger of right hand M67.441 727.43        Plan: At this point we will progress her to occupational therapy to work on range of motion exercises. The patient has asked me about a mucinous cyst on 1 of her digits but I have told her that we need to wait until her wrist is better to go forward with this.     Plan was reviewed with patient, who verbalized agreement and understanding of the plan

## 2021-03-16 ENCOUNTER — HOSPITAL ENCOUNTER (OUTPATIENT)
Age: 71
Discharge: HOME | End: 2021-03-16
Attending: FAMILY MEDICINE
Payer: MEDICARE

## 2021-03-16 PROCEDURE — 73221 MRI JOINT UPR EXTREM W/O DYE: CPT

## 2021-03-23 ENCOUNTER — OFFICE VISIT (OUTPATIENT)
Dept: ORTHOPEDIC SURGERY | Age: 71
End: 2021-03-23
Payer: MEDICARE

## 2021-03-23 VITALS
TEMPERATURE: 98 F | BODY MASS INDEX: 30.36 KG/M2 | RESPIRATION RATE: 15 BRPM | HEART RATE: 77 BPM | HEIGHT: 62 IN | WEIGHT: 165 LBS

## 2021-03-23 DIAGNOSIS — M75.121 NONTRAUMATIC COMPLETE TEAR OF RIGHT ROTATOR CUFF: Primary | ICD-10-CM

## 2021-03-23 DIAGNOSIS — M75.41 SHOULDER IMPINGEMENT, RIGHT: ICD-10-CM

## 2021-03-23 DIAGNOSIS — M19.011 OSTEOARTHRITIS OF RIGHT AC (ACROMIOCLAVICULAR) JOINT: ICD-10-CM

## 2021-03-23 PROCEDURE — 1090F PRES/ABSN URINE INCON ASSESS: CPT | Performed by: FAMILY MEDICINE

## 2021-03-23 PROCEDURE — 1101F PT FALLS ASSESS-DOCD LE1/YR: CPT | Performed by: FAMILY MEDICINE

## 2021-03-23 PROCEDURE — 99213 OFFICE O/P EST LOW 20 MIN: CPT | Performed by: FAMILY MEDICINE

## 2021-03-23 PROCEDURE — G8428 CUR MEDS NOT DOCUMENT: HCPCS | Performed by: FAMILY MEDICINE

## 2021-03-23 PROCEDURE — G8756 NO BP MEASURE DOC: HCPCS | Performed by: FAMILY MEDICINE

## 2021-03-23 PROCEDURE — 3017F COLORECTAL CA SCREEN DOC REV: CPT | Performed by: FAMILY MEDICINE

## 2021-03-23 PROCEDURE — G8417 CALC BMI ABV UP PARAM F/U: HCPCS | Performed by: FAMILY MEDICINE

## 2021-03-23 PROCEDURE — G9899 SCRN MAM PERF RSLTS DOC: HCPCS | Performed by: FAMILY MEDICINE

## 2021-03-23 PROCEDURE — G8536 NO DOC ELDER MAL SCRN: HCPCS | Performed by: FAMILY MEDICINE

## 2021-03-23 PROCEDURE — G8432 DEP SCR NOT DOC, RNG: HCPCS | Performed by: FAMILY MEDICINE

## 2021-03-23 PROCEDURE — G8399 PT W/DXA RESULTS DOCUMENT: HCPCS | Performed by: FAMILY MEDICINE

## 2021-03-23 NOTE — PROGRESS NOTES
AVS reviewed: YES  HEP: AT reviewed  Resources Provided: YES YouTube Videos  Patient questions/concerns answered: NO pt declined question or concern   Patient verbalized understanding of treatment plan: YES

## 2021-03-23 NOTE — PATIENT INSTRUCTIONS
Continue with using medication as needed. Search YouTube for my channel: 
 
Dr. Beny Llamas Rotator cuff Low back/Piriformis Runner's Knee Hip Stretches Plantar Fasciitis IT Band Concussion Pes Anserine/Plica Anderson Splints Carpal Tunnel Neck/Upper back

## 2021-03-23 NOTE — LETTER
3/23/2021 Patient: Luigi Bowser YOB: 1950 Date of Visit: 3/23/2021 Royal Kamar MD 
[facility] [address] [place] [phone] Via Fax: [phone] Dear Royal Kamar MD, Thank you for referring Ms. Mo Villalobos to [facility] for evaluation. My notes for this consultation are attached. If you have questions, please do not hesitate to call me. I look forward to following your patient along with you.  
 
 
Sincerely, 
 
Shannan Oshea, DO

## 2021-03-23 NOTE — PROGRESS NOTES
HISTORY OF PRESENT ILLNESS    Farnaz NICKY Gallo 1950 is a 80y.o. year old female comes in today to be evaluated and treated for: right shoulder pain/MRi result    Since last appt has noticed pain and will lock if abducts. Pain level 3/10 superior. Using voltaren gel with benefit. Subacromial injection [id number] has helped some with pain. IMAGING: MRI right shoulder 3/17/2021  IMPRESSION  1. Moderate-sized full-thickness tear of the supraspinatus with adjacent  articular sided partial tear of the infraspinatus. 2.  Moderate AC joint osteoarthritis and subacromial spur. XR right shoulder 1/12/2021  IMPRESSION:  No acute osseous findings. Acromiohumeral findings suggest chronic rotator cuff  pathology. Mild AC joint degenerative change. Past Surgical History:   Procedure Laterality Date    HX CATARACT REMOVAL      HX LEEP PROCEDURE      CO APPENDECTOMY       Social History     Socioeconomic History    Marital status:      Spouse name: Not on file    Number of children: Not on file    Years of education: Not on file    Highest education level: Not on file   Tobacco Use    Smoking status: Current Some Day Smoker    Smokeless tobacco: Never Used   Substance and Sexual Activity    Alcohol use: Not Currently    Drug use: No     Current Outpatient Medications   Medication Sig Dispense Refill    meloxicam (MOBIC) 7.5 mg tablet Take 1 Tab by mouth daily. 90 Tab 0    BIOTIN PO Take  by mouth.  multivitamin (ONE A DAY) tablet Take 1 Tab by mouth daily.  mirabegron ER (Myrbetriq) 50 mg ER tablet Take 1 Tab by mouth daily. 90 Tab 2    Synthroid 150 mcg tablet       aspirin delayed-release 81 mg tablet Take  by mouth daily.  coenzyme q10 (Co Q-10) 10 mg cap Take  by mouth.  telmisartan (MICARDIS) 40 mg tablet       cetirizine (ZYRTEC) 10 mg tablet       pitavastatin calcium (LIVALO) 2 mg tablet Take  by mouth daily.  pantoprazole (PROTONIX) 40 mg tablet Take  by mouth.  REFRESH TEARS 0.5 % drop ophthalmic solution       omega-3 fatty acids-vitamin e 1,000 mg cap Take 1 Cap by mouth two (2) times a day.  ergocalciferol (ERGOCALCIFEROL) 50,000 unit capsule Take 50,000 Units by mouth every seven (7) days.  levothyroxine (SYNTHROID) 112 mcg tablet Take 150 mcg by mouth. Past Medical History:   Diagnosis Date    Acquired ventricular hypertrophy     Allergic rhinitis     Bladder incontinence     Cardiac echocardiogram 05/11/2016    On-Sight Ultrasound:  EF 48%. No RWMA on segments seen. Mild conc LVH. Gr 1 DDfx. Normal RVSP. Mild TR.  Cardiac nuclear imaging test, low risk 06/15/2016    Low risk. No ischemia or prior infarction. No RWMA. EF 71%. Neg EKG on maximal EST. Ex time 8 min 45 sec.  Cardiomyopathy ([address]     Cardiovascular disease     Esophageal reflux     Essential hypertension     Hyperlipidemia     Numerous moles     Sleep apnea     Thyroid disease     Vitamin D deficiency      Family History   Problem Relation Age of Onset    Heart Attack Brother     High Cholesterol Father     Arthritis-osteo Father     Breast Cancer Sister          ROS:  No numb, tingle, swell. Objective:  Pulse 77   Temp 98 °F (36.7 °C)   Resp 15   Ht 5' 2\" (1.575 m)   Wt 165 lb (74.8 kg)   BMI 30.18 kg/m²   NEURO:  Sensation intact light touch B/L upper extremities.  right hand dominant. DTRs normal biceps and triceps   M/S:  Shoulder ROM Normal  bilaterally.  Spurling's negative bilaterally  right Shoulder:  Empty can less positive External rotation negative.  Internal rotation negative. [place] negative.  SLAP negative.  Load and Shift +1 Anterior, 1 Posterior.  Strength +5/5 bilaterally upper extremities.  Crossover test negative.  Negative atrophy bilaterally.  Positive TTP at [facility] joint.  Apprehension test negative. Olivares-John Test less positive. Yergason's test negative.  Speed's test negative.     Assessment/Plan:     ICD-10-CM ICD-9-CM    1. Nontraumatic complete tear of right rotator cuff  M75.121 727.61    2. Osteoarthritis of right AC (acromioclavicular) joint  M19.011 715.91    3. Shoulder impingement, right  M75.41 726.2      Patient (or guardian if minor) verbalizes understanding of evaluation and plan. Discussed next option surgical eval which she declines and will await worsening SX. Continue voltaren gel.

## 2021-04-22 ENCOUNTER — TRANSCRIBE ORDER (OUTPATIENT)
Dept: SCHEDULING | Age: 71
End: 2021-04-22

## 2021-04-22 DIAGNOSIS — R91.1 LUNG NODULE: Primary | ICD-10-CM

## 2021-04-30 ENCOUNTER — HOSPITAL ENCOUNTER (OUTPATIENT)
Dept: CT IMAGING | Age: 71
Discharge: HOME | End: 2021-04-30
Attending: FAMILY MEDICINE
Payer: MEDICARE

## 2021-04-30 DIAGNOSIS — R91.1 LUNG NODULE: ICD-10-CM

## 2021-04-30 PROCEDURE — 71250 CT THORAX DX C-: CPT

## 2021-05-24 ENCOUNTER — TRANSCRIBE ORDER (OUTPATIENT)
Dept: SCHEDULING | Age: 71
End: 2021-05-24

## 2021-05-24 DIAGNOSIS — Z12.31 VISIT FOR SCREENING MAMMOGRAM: Primary | ICD-10-CM

## 2021-06-17 ENCOUNTER — HOSPITAL ENCOUNTER (OUTPATIENT)
Dept: MAMMOGRAPHY | Age: 71
Discharge: HOME | End: 2021-06-17
Attending: FAMILY MEDICINE
Payer: MEDICARE

## 2021-06-17 DIAGNOSIS — Z12.31 VISIT FOR SCREENING MAMMOGRAM: ICD-10-CM

## 2021-06-17 PROCEDURE — 77063 BREAST TOMOSYNTHESIS BI: CPT

## 2021-07-02 ENCOUNTER — TRANSCRIBE ORDER (OUTPATIENT)
Dept: SCHEDULING | Age: 71
End: 2021-07-02

## 2021-07-02 DIAGNOSIS — Z78.0 ASYMPTOMATIC POSTMENOPAUSAL STATE: ICD-10-CM

## 2021-07-02 DIAGNOSIS — Z13.820 SCREENING FOR OSTEOPOROSIS: Primary | ICD-10-CM

## 2021-07-19 ENCOUNTER — HOSPITAL ENCOUNTER (OUTPATIENT)
Dept: BONE DENSITY | Age: 71
Discharge: HOME | End: 2021-07-19
Attending: FAMILY MEDICINE
Payer: MEDICARE

## 2021-07-19 DIAGNOSIS — Z78.0 ASYMPTOMATIC POSTMENOPAUSAL STATE: ICD-10-CM

## 2021-07-19 DIAGNOSIS — Z13.820 SCREENING FOR OSTEOPOROSIS: ICD-10-CM

## 2021-07-19 PROCEDURE — 77080 DXA BONE DENSITY AXIAL: CPT

## 2021-08-02 ENCOUNTER — OFFICE VISIT (OUTPATIENT)
Dept: CARDIOLOGY CLINIC | Age: 71
End: 2021-08-02
Payer: MEDICARE

## 2021-08-02 VITALS
SYSTOLIC BLOOD PRESSURE: 138 MMHG | HEART RATE: 71 BPM | OXYGEN SATURATION: 99 % | DIASTOLIC BLOOD PRESSURE: 86 MMHG | BODY MASS INDEX: 30 KG/M2 | WEIGHT: 163 LBS | HEIGHT: 62 IN

## 2021-08-02 DIAGNOSIS — E78.5 DYSLIPIDEMIA: ICD-10-CM

## 2021-08-02 DIAGNOSIS — I10 ESSENTIAL HYPERTENSION: Primary | ICD-10-CM

## 2021-08-02 DIAGNOSIS — Z82.49 FAMILY HISTORY OF EARLY CAD: ICD-10-CM

## 2021-08-02 PROCEDURE — 1101F PT FALLS ASSESS-DOCD LE1/YR: CPT | Performed by: INTERNAL MEDICINE

## 2021-08-02 PROCEDURE — G8399 PT W/DXA RESULTS DOCUMENT: HCPCS | Performed by: INTERNAL MEDICINE

## 2021-08-02 PROCEDURE — G8752 SYS BP LESS 140: HCPCS | Performed by: INTERNAL MEDICINE

## 2021-08-02 PROCEDURE — 99214 OFFICE O/P EST MOD 30 MIN: CPT | Performed by: INTERNAL MEDICINE

## 2021-08-02 PROCEDURE — 1090F PRES/ABSN URINE INCON ASSESS: CPT | Performed by: INTERNAL MEDICINE

## 2021-08-02 PROCEDURE — G8510 SCR DEP NEG, NO PLAN REQD: HCPCS | Performed by: INTERNAL MEDICINE

## 2021-08-02 PROCEDURE — G8427 DOCREV CUR MEDS BY ELIG CLIN: HCPCS | Performed by: INTERNAL MEDICINE

## 2021-08-02 PROCEDURE — G8417 CALC BMI ABV UP PARAM F/U: HCPCS | Performed by: INTERNAL MEDICINE

## 2021-08-02 PROCEDURE — G8536 NO DOC ELDER MAL SCRN: HCPCS | Performed by: INTERNAL MEDICINE

## 2021-08-02 PROCEDURE — 93000 ELECTROCARDIOGRAM COMPLETE: CPT | Performed by: INTERNAL MEDICINE

## 2021-08-02 PROCEDURE — G8754 DIAS BP LESS 90: HCPCS | Performed by: INTERNAL MEDICINE

## 2021-08-02 PROCEDURE — 3017F COLORECTAL CA SCREEN DOC REV: CPT | Performed by: INTERNAL MEDICINE

## 2021-08-02 PROCEDURE — G9899 SCRN MAM PERF RSLTS DOC: HCPCS | Performed by: INTERNAL MEDICINE

## 2021-08-02 RX ORDER — MELATONIN
1000 DAILY
COMMUNITY

## 2021-08-02 RX ORDER — LANOLIN ALCOHOL/MO/W.PET/CERES
1000 CREAM (GRAM) TOPICAL DAILY
COMMUNITY

## 2021-08-02 RX ORDER — TELMISARTAN 80 MG/1
80 TABLET ORAL DAILY
COMMUNITY

## 2021-08-02 NOTE — PROGRESS NOTES
HISTORY OF PRESENT ILLNESS  Shreyas Hendricks is a 80 y.o. female. Follow-up  Pertinent negatives include no chest pain, no abdominal pain, no headaches and no shortness of breath. Patient presents for a follow-up office visit. She has a past medical history significant for hypertension, dyslipidemia, and hypothyroidism. She reports a family history for coronary artery disease. She was also a smoker up until 2016. Patient underwent an exercise nuclear stress test in June 2016 at our office which was a normal study. Patient exercised for 8 minutes 45 seconds without EKG changes or symptoms. Her ejection fraction was normal.  There is no evidence of ischemia. More recently, she underwent an echocardiogram in September 2018 which was essentially normal.  EF 60%, no valvular heart disease with exception of mild aortic valve calcification without stenosis. She underwent ABIs in August 2020 to evaluate bilateral leg pain. This was negative for PAD. She was last seen in our office approximately 1 year ago. Since last visit, her PCP increased her telmisartan to 80 mg daily and she has been feeling better with the dose increase. She is trying to exercise most days of the week by swimming 30 minutes in her pool every day. She denies any major change in her activity tolerance. No exertional chest pain or shortness of breath. She has lost a little weight with lifestyle modification. Past Medical History:   Diagnosis Date    Acquired ventricular hypertrophy     Allergic rhinitis     Bladder incontinence     Cardiac echocardiogram 05/11/2016    On-Sight Ultrasound:  EF 48%. No RWMA on segments seen. Mild conc LVH. Gr 1 DDfx. Normal RVSP. Mild TR.  Cardiac nuclear imaging test, low risk 06/15/2016    Low risk. No ischemia or prior infarction. No RWMA. EF 71%. Neg EKG on maximal EST. Ex time 8 min 45 sec.     Cardiomyopathy ([address]     Cardiovascular disease     Esophageal reflux  Essential hypertension     Hyperlipidemia     Numerous moles     Sleep apnea     Thyroid disease     Vitamin D deficiency       Current Outpatient Medications   Medication Sig Dispense Refill    telmisartan (Micardis) 80 mg tablet Take 80 mg by mouth daily.  cyanocobalamin (Vitamin B-12) 1,000 mcg tablet Take 1,000 mcg by mouth daily.  cholecalciferol (Vitamin D3) (1000 Units /25 mcg) tablet Take 1,000 Units by mouth daily.  mirabegron ER (Myrbetriq) 50 mg ER tablet Take 1 Tablet by mouth daily. 90 Tablet 3    meloxicam (MOBIC) 7.5 mg tablet Take 1 Tab by mouth daily. (Patient taking differently: Take 7.5 mg by mouth daily as needed.) 90 Tab 0    BIOTIN PO Take  by mouth.  multivitamin (ONE A DAY) tablet Take 1 Tab by mouth daily.  Synthroid 150 mcg tablet Take 150 mcg by mouth daily.  aspirin delayed-release 81 mg tablet Take  by mouth daily.  cetirizine (ZYRTEC) 10 mg tablet Take 10 mg by mouth daily.  pitavastatin calcium (LIVALO) 2 mg tablet Take  by mouth daily.  pantoprazole (PROTONIX) 40 mg tablet Take  by mouth.  REFRESH TEARS 0.5 % drop ophthalmic solution       omega-3 fatty acids-vitamin e 1,000 mg cap Take 1 Cap by mouth two (2) times a day. Allergies   Allergen Reactions    Septra [Sulfamethoprim] Other (comments)     Upset stomach, makes patient real sick        Social History     Tobacco Use    Smoking status: Light Tobacco Smoker    Smokeless tobacco: Never Used   Vaping Use    Vaping Use: Never used   Substance Use Topics    Alcohol use: Not Currently    Drug use: No            Review of Systems   Constitutional: Negative for chills, fever and weight loss. HENT: Negative for nosebleeds. Eyes: Negative for blurred vision and double vision. Respiratory: Negative for cough, shortness of breath and wheezing. Cardiovascular: Negative for chest pain, palpitations, orthopnea, claudication, leg swelling and PND. Gastrointestinal: Negative for abdominal pain, heartburn, nausea and vomiting. Genitourinary: Negative for dysuria and hematuria. Musculoskeletal: Negative for falls and myalgias. Skin: Negative for rash. Neurological: Negative for dizziness, focal weakness and headaches. Endo/Heme/Allergies: Does not bruise/bleed easily. Psychiatric/Behavioral: Negative for substance abuse. Visit Vitals  /86 (BP 1 Location: Left upper arm, BP Patient Position: Sitting, BP Cuff Size: Small adult)   Pulse 71   Ht 5' 2\" (1.575 m)   Wt 73.9 kg (163 lb)   SpO2 99%   BMI 29.81 kg/m²      Physical Exam  Constitutional:       Appearance: She is well-developed. HENT:      Head: Normocephalic and atraumatic. Eyes:      Conjunctiva/sclera: Conjunctivae normal.   Neck:      Vascular: No carotid bruit or JVD. Cardiovascular:      Rate and Rhythm: Normal rate and regular rhythm. Pulses: Normal pulses. Heart sounds: S1 normal and S2 normal. No murmur heard. No gallop. Pulmonary:      Effort: Pulmonary effort is normal.      Breath sounds: Normal breath sounds. No wheezing or rales. Abdominal:      General: Bowel sounds are normal.      Palpations: Abdomen is soft. Tenderness: There is no abdominal tenderness. Musculoskeletal:         General: No swelling or tenderness. Cervical back: Neck supple. Skin:     General: Skin is warm and dry. Neurological:      General: No focal deficit present. Mental Status: She is alert and oriented to person, place, and time. Psychiatric:         Behavior: Behavior normal.         Thought Content: Thought content normal.       EKG: Normal sinus rhythm, normal axis, normal QTc interval, borderline low voltage, no ST or T wave abnormalities concerning for ischemia. Borderline poor R wave progression. Compared to the previous EKG, no significant interval change. ASSESSMENT and PLAN    Essential hypertension.   This remains well controlled on her current medical regimen which includes Micardis as monotherapy. Dyslipidemia. Patient is now taking Livalo 2 mg daily. This is followed by her PCP. History of tobacco abuse. Patient quit smoking in 2016. She is congratulated for this and reminded to remain tobacco free. Family history for CAD. Because of this I recommended that she exercise regularly and continue to modify all of her risk factors. Total visit time was 30 minutes of which greater than 50% was face-to-face counseling. Patient can follow up annually, sooner if needed.

## 2021-08-02 NOTE — PROGRESS NOTES
Keith Johanny presents today for   Chief Complaint   Patient presents with    Follow-up     1 year follow up       Keith Orlando preferred language for health care discussion is english/other. Is someone accompanying this pt? no    Is the patient using any DME equipment during [address]? no    Depression Screening:  3 most recent PHQ Screens 8/2/2021   PHQ Not Done -   Little interest or pleasure in doing things Not at all   Feeling down, depressed, irritable, or hopeless Not at all   Total Score PHQ 2 0       Learning Assessment:  Learning Assessment 8/2/2021   PRIMARY LEARNER Patient   PRIMARY LANGUAGE ENGLISH   LEARNER PREFERENCE PRIMARY DEMONSTRATION   ANSWERED BY patient   RELATIONSHIP SELF       Abuse Screening:  Abuse Screening Questionnaire 8/2/2021   Do you ever feel afraid of your partner? N   Are you in a relationship with someone who physically or mentally threatens you? N   Is it safe for you to go home? Y       Fall Risk  Fall Risk Assessment, last 12 mths 8/2/2021   Able to walk? Yes   Fall in past 12 months? 0   Do you feel unsteady? 0   Are you worried about falling 0   Number of falls in past 12 months -   Fall with injury? -           Pt currently taking Anticoagulant therapy? no    Pt currently taking Antiplatelet therapy ? ASA 81 mg once a day      Coordination of Care:  1. Have you been to the ER, urgent care clinic since your last visit? Hospitalized since your last visit? no    2. Have you seen or consulted any other health care providers outside of the [address] [place] [address] since your last visit? Include any pap smears or colon screening.  no

## 2022-01-20 ENCOUNTER — HOSPITAL ENCOUNTER (OUTPATIENT)
Dept: GENERAL RADIOLOGY | Age: 72
Discharge: HOME | End: 2022-01-20
Payer: MEDICARE

## 2022-01-20 DIAGNOSIS — R07.81 RIB PAIN ON LEFT SIDE: ICD-10-CM

## 2022-01-20 DIAGNOSIS — R07.81 PAIN IN RIB: ICD-10-CM

## 2022-01-20 PROCEDURE — 71100 X-RAY EXAM RIBS UNI 2 VIEWS: CPT

## 2022-01-25 ENCOUNTER — OFFICE VISIT (OUTPATIENT)
Dept: ORTHOPEDIC SURGERY | Age: 72
End: 2022-01-25
Payer: MEDICARE

## 2022-01-25 VITALS
TEMPERATURE: 97.6 F | BODY MASS INDEX: 29.81 KG/M2 | HEART RATE: 78 BPM | OXYGEN SATURATION: 98 % | HEIGHT: 62 IN | RESPIRATION RATE: 16 BRPM

## 2022-01-25 DIAGNOSIS — M79.18 MYOFASCIAL PAIN: ICD-10-CM

## 2022-01-25 DIAGNOSIS — M54.50 LUMBAR SPINE PAIN: ICD-10-CM

## 2022-01-25 DIAGNOSIS — M54.6 THORACIC SPINE PAIN: ICD-10-CM

## 2022-01-25 DIAGNOSIS — M54.59 MECHANICAL LOW BACK PAIN: Primary | ICD-10-CM

## 2022-01-25 PROCEDURE — G8756 NO BP MEASURE DOC: HCPCS | Performed by: PHYSICAL MEDICINE & REHABILITATION

## 2022-01-25 PROCEDURE — 1090F PRES/ABSN URINE INCON ASSESS: CPT | Performed by: PHYSICAL MEDICINE & REHABILITATION

## 2022-01-25 PROCEDURE — 3017F COLORECTAL CA SCREEN DOC REV: CPT | Performed by: PHYSICAL MEDICINE & REHABILITATION

## 2022-01-25 PROCEDURE — G8432 DEP SCR NOT DOC, RNG: HCPCS | Performed by: PHYSICAL MEDICINE & REHABILITATION

## 2022-01-25 PROCEDURE — G8427 DOCREV CUR MEDS BY ELIG CLIN: HCPCS | Performed by: PHYSICAL MEDICINE & REHABILITATION

## 2022-01-25 PROCEDURE — G8417 CALC BMI ABV UP PARAM F/U: HCPCS | Performed by: PHYSICAL MEDICINE & REHABILITATION

## 2022-01-25 PROCEDURE — G9899 SCRN MAM PERF RSLTS DOC: HCPCS | Performed by: PHYSICAL MEDICINE & REHABILITATION

## 2022-01-25 PROCEDURE — 99214 OFFICE O/P EST MOD 30 MIN: CPT | Performed by: PHYSICAL MEDICINE & REHABILITATION

## 2022-01-25 PROCEDURE — G8399 PT W/DXA RESULTS DOCUMENT: HCPCS | Performed by: PHYSICAL MEDICINE & REHABILITATION

## 2022-01-25 PROCEDURE — 1101F PT FALLS ASSESS-DOCD LE1/YR: CPT | Performed by: PHYSICAL MEDICINE & REHABILITATION

## 2022-01-25 PROCEDURE — G8536 NO DOC ELDER MAL SCRN: HCPCS | Performed by: PHYSICAL MEDICINE & REHABILITATION

## 2022-01-25 RX ORDER — MELOXICAM 7.5 MG/1
7.5 TABLET ORAL
Qty: 90 TABLET | Refills: 1 | Status: CANCELLED | OUTPATIENT
Start: 2022-01-25

## 2022-01-25 RX ORDER — MELOXICAM 7.5 MG/1
7.5 TABLET ORAL DAILY
Qty: 90 TABLET | Refills: 1 | Status: SHIPPED | OUTPATIENT
Start: 2022-01-25

## 2022-01-25 RX ORDER — ALBUTEROL SULFATE 90 UG/1
AEROSOL, METERED RESPIRATORY (INHALATION)
COMMUNITY
Start: 2021-12-09

## 2022-01-25 NOTE — PATIENT INSTRUCTIONS
Healthy Upper Back: Exercises  Introduction  Here are some examples of exercises for your upper back. Start each exercise slowly. Ease off the exercise if you start to have pain. Your doctor or physical therapist will tell you when you can start these exercises and which ones will work best for you. How to do the exercises  Lower neck and upper back stretch    1. Stretch your arms out in front of your body. Clasp one hand on top of your other hand. 2. Gently reach out so that you feel your shoulder blades stretching away from each other. 3. Gently bend your head forward. 4. Hold for 15 to 30 seconds. 5. Repeat 2 to 4 times. Midback stretch    If you have knee pain, do not do this exercise. 1. Kneel on the floor, and sit back on your ankles. 2. Lean forward, place your hands on the floor, and stretch your arms out in front of you. Rest your head between your arms. 3. Gently push your chest toward the floor, reaching as far in front of you as possible. 4. Hold for 15 to 30 seconds. 5. Repeat 2 to 4 times. Shoulder rolls    1. Sit comfortably with your feet shoulder-width apart. You can also do this exercise while standing. 2. Roll your shoulders up, then back, and then down in a smooth, circular motion. 3. Repeat 2 to 4 times. Wall push-up    1. Stand against a wall with your feet about 12 to 24 inches back from the wall. If you feel any pain when you do this exercise, stand closer to the wall. 2. Place your hands on the wall slightly wider apart than your shoulders, and lean forward. 3. Gently lean your body toward the wall. Then push back to your starting position. Keep the motion smooth and controlled. 4. Repeat 8 to 12 times. Resisted shoulder blade squeeze    For this exercise, you will need elastic exercise material, such as surgical tubing or Thera-Band. 1. Sit or stand, holding the band in both hands in front of you. Keep your elbows close to your sides, bent at a 90-degree angle. Your palms should face up. 2. Squeeze your shoulder blades together, and move your arms to the outside, stretching the band. Be sure to keep your elbows at your sides while you do this. 3. Relax. 4. Repeat 8 to 12 times. Resisted rows    For this exercise, you will need elastic exercise material, such as surgical tubing or Thera-Band. 1. Put the band around a solid object, such as a bedpost, at about waist level. Hold one end of the band in each hand. 2. With your elbows at your sides and bent to 90 degrees, pull the band back to move your shoulder blades toward each other. Return to the starting position. 3. Repeat 8 to 12 times. Follow-up care is a key part of your treatment and safety. Be sure to make and go to all appointments, and call your doctor if you are having problems. It's also a good idea to know your test results and keep a list of the medicines you take. Where can you learn more? Go to http://www.gray.com/  Enter V670 in the search box to learn more about \"Healthy Upper Back: Exercises. \"  Current as of: July 1, 2021               Content Version: 13.0  © 5551-7309 "1,2,3 [employer]". Care instructions adapted under license by [employer] (which disclaims liability or warranty for this information). If you have questions about a medical condition or this instruction, always ask your healthcare professional. [place] [address] any warranty or liability for your use of this information. Low Back Pain: Exercises  Introduction  Here are some examples of exercises for you to try. The exercises may be suggested for a condition or for rehabilitation. Start each exercise slowly. Ease off the exercises if you start to have pain. You will be told when to start these exercises and which ones will work best for you. How to do the exercises  Press-up    1. Lie on your stomach, supporting your body with your forearms.   2. Press your elbows down into the floor to raise your upper back. As you do this, relax your stomach muscles and allow your back to arch without using your back muscles. As your press up, do not let your hips or pelvis come off the floor. 3. Hold for 15 to 30 seconds, then relax. 4. Repeat 2 to 4 times. Alternate arm and leg (bird dog) exercise    Do this exercise slowly. Try to keep your body straight at all times, and do not let one hip drop lower than the other. 1. Start on the floor, on your hands and knees. 2. Tighten your belly muscles. 3. Raise one leg off the floor, and hold it straight out behind you. Be careful not to let your hip drop down, because that will twist your trunk. 4. Hold for about 6 seconds, then lower your leg and switch to the other leg. 5. Repeat 8 to 12 times on each leg. 6. Over time, work up to holding for 10 to 30 seconds each time. 7. If you feel stable and secure with your leg raised, try raising the opposite arm straight out in front of you at the same time. Knee-to-chest exercise    1. Lie on your back with your knees bent and your feet flat on the floor. 2. Bring one knee to your chest, keeping the other foot flat on the floor (or keeping the other leg straight, whichever feels better on your lower back). 3. Keep your lower back pressed to the floor. Hold for at least 15 to 30 seconds. 4. Relax, and lower the knee to the starting position. 5. Repeat with the other leg. Repeat 2 to 4 times with each leg. 6. To get more stretch, put your other leg flat on the floor while pulling your knee to your chest.  Curl-ups    1. Lie on the floor on your back with your knees bent at a 90-degree angle. Your feet should be flat on the floor, about 12 inches from your buttocks. 2. Cross your arms over your chest. If this bothers your neck, try putting your hands behind your neck (not your head), with your elbows spread apart.   3. Slowly tighten your belly muscles and raise your shoulder blades off the floor. 4. Keep your head in line with your body, and do not press your chin to your chest.  5. Hold this position for 1 or 2 seconds, then slowly lower yourself back down to the floor. 6. Repeat 8 to 12 times. Pelvic tilt exercise    1. Lie on your back with your knees bent. 2. \"Brace\" your stomach. This means to tighten your muscles by pulling in and imagining your belly button moving toward your spine. You should feel like your back is pressing to the floor and your hips and pelvis are rocking back. 3. Hold for about 6 seconds while you breathe smoothly. 4. Repeat 8 to 12 times. Heel dig bridging    1. Lie on your back with both knees bent and your ankles bent so that only your heels are digging into the floor. Your knees should be bent about 90 degrees. 2. Then push your heels into the floor, squeeze your buttocks, and lift your hips off the floor until your shoulders, hips, and knees are all in a straight line. 3. Hold for about 6 seconds as you continue to breathe normally, and then slowly lower your hips back down to the floor and rest for up to 10 seconds. 4. Do 8 to 12 repetitions. Hamstring stretch in doorway    1. Lie on your back in a doorway, with one leg through the open door. 2. Slide your leg up the wall to straighten your knee. You should feel a gentle stretch down the back of your leg. 3. Hold the stretch for at least 15 to 30 seconds. Do not arch your back, point your toes, or bend either knee. Keep one heel touching the floor and the other heel touching the wall. 4. Repeat with your other leg. 5. Do 2 to 4 times for each leg. Hip flexor stretch    1. Kneel on the floor with one knee bent and one leg behind you. Place your forward knee over your foot. Keep your other knee touching the floor. 2. Slowly push your hips forward until you feel a stretch in the upper thigh of your rear leg. 3. Hold the stretch for at least 15 to 30 seconds.  Repeat with your other leg.  4. Do 2 to 4 times on each side. Wall sit    1. Stand with your back 10 to 12 inches away from a wall. 2. Lean into the wall until your back is flat against it. 3. Slowly slide down until your knees are slightly bent, pressing your lower back into the wall. 4. Hold for about 6 seconds, then slide back up the wall. 5. Repeat 8 to 12 times. Follow-up care is a key part of your treatment and safety. Be sure to make and go to all appointments, and call your doctor if you are having problems. It's also a good idea to know your test results and keep a list of the medicines you take. Where can you learn more? Go to http://www.gray.com/  Enter R383 in the search box to learn more about \"Low Back Pain: Exercises. \"  Current as of: July 1, 2021               Content Version: 13.0  © 2006-2021 [employer]. Care instructions adapted under license by [employer] (which disclaims liability or warranty for this information). If you have questions about a medical condition or this instruction, always ask your healthcare professional. [place] [address] any warranty or liability for your use of this information.

## 2022-01-25 NOTE — PROGRESS NOTES
[address]  [address], [address]  [facility], [address]  Phone: [phone]  Fax: [phone]        Rolando Paul  : 1950  PCP: Luis Stevens MD  2022    PROGRESS NOTE      HISTORY OF PRESENT ILLNESS  Ashley Martinez is a 80 y.o. female who was seen as a new patient 2020 with  c/o low back pain bilaterally, but more localized towards the left SI joint and buttock x 3 years. Her pain seemed to flare up every summer. She has tried Flexeril and OTC NSAIDs without benefit. She also c/o pain and bruising near her sacrum. She was previously seen in 2017 by Dr. Thang Weldon and she was given left iliolumbar trigger point injections without benefit. She was also given Gabapentin. She attended PT () with benefit with US and laser therapy. She failed to return for follow-up. Lumbar spine MRI dated 2020 reviewed. Per report, Slightly larger disc protrusion at L4-5 which causes relative mild spinal canal narrowing. Protrusion contacts but does not clearly compress the crossing L5 nerve roots. Anomalous appearing left L5-S1 facet in conjunction with a disc osteophyte complex causing severe left foraminal stenosis with compression of the partially conjoined and late exiting L5 nerve root, stable. Stable moderate right foraminal stenosis at L4-5. Stable mild to moderate right foraminal stenosis at L5-S1. She underwent a Left L5 TFESI (2020; Dr. Warren Abraham) with benefit after 1 week. She found the most benefit with PT (20-21; Kateryna). She also c/o right shoulder pain and notes that she has an MRI that revealed a rotator cuff tear. She found benefit from an injection 6 years ago. Ashley Martinez comes in to the office today for f/u. She has been under the care of Dr. Carol Iniguez for her right shoulder pain. She was advised she likely needs a surgery, but she is not inclined as her pain is not intolerable.  She uses Voltaren gel with benefit. Today, she c/o new pain around her bra line in the thoracic area. She had a rib XR that did not reveal any acute injury. The pain is sometimes so sharp that she has difficulty breathing. She uses Voltaren gel. She continues to have low back and buttock pain. She uses a CPAP machine, and she sleeps on her sides. She uses Mobic with benefit. Her massage therapist often tells her she is very tense. She maintains an HEP of yoga. Pain Score: 6/10. Treatments patient has tried:  Physical therapy:Yes - 2021 with benefit  Doing HEP: Yes  Non-opioid medications: Yes - Mobic, Voltaren gel  Spinal injections:  Yes - left L5 TFESI (2020) with benefit  Spinal surgery- No.   Last Lumbar MRI 2020     PmHx: Cardiomyopathy, hypothyroidism    ASSESSMENT  Emma Delcid is a 80 y.o. female with c/o mid to low back pain. Her pain is likely myofascial in nature. PLAN  1. Provided upper and low back exercises to add to HEP. I also recommended she find Pilates exercises on youtube to do as well. 2. Refill Mobic 7.5 mg daily. Add Tylenol if needed. 3. She declined a referral to formal PT due to a busy schedule. Pt will f/u in 6-8 weeks or sooner as needed. Diagnoses and all orders for this visit:    1. Mechanical low back pain    2. Myofascial pain    3. Thoracic spine pain    4. Lumbar spine pain         PAST MEDICAL HISTORY   Past Medical History:   Diagnosis Date    Acquired ventricular hypertrophy     Allergic rhinitis     Bladder incontinence     Cardiac echocardiogram 05/11/2016    On-Sight Ultrasound:  EF 48%. No RWMA on segments seen. Mild conc LVH. Gr 1 DDfx. Normal RVSP. Mild TR.  Cardiac nuclear imaging test, low risk 06/15/2016    Low risk. No ischemia or prior infarction. No RWMA. EF 71%. Neg EKG on maximal EST. Ex time 8 min 45 sec.     Cardiomyopathy ([address]     Cardiovascular disease     Esophageal reflux     Essential hypertension     Hyperlipidemia     Numerous moles     Sleep apnea     Thyroid disease     Vitamin D deficiency        Past Surgical History:   Procedure Laterality Date    HX CATARACT REMOVAL      HX LEEP PROCEDURE      ND APPENDECTOMY     . MEDICATIONS      Current Outpatient Medications   Medication Sig Dispense Refill    albuterol (PROVENTIL HFA, VENTOLIN HFA, PROAIR HFA) 90 mcg/actuation inhaler inhale 2 puffs by mouth and INTO THE LUNGS every 6 hours if needed for cough      telmisartan (Micardis) 80 mg tablet Take 80 mg by mouth daily.  cyanocobalamin (Vitamin B-12) 1,000 mcg tablet Take 1,000 mcg by mouth daily.  cholecalciferol (Vitamin D3) (1000 Units /25 mcg) tablet Take 1,000 Units by mouth daily.  mirabegron ER (Myrbetriq) 50 mg ER tablet Take 1 Tablet by mouth daily. 90 Tablet 3    meloxicam (MOBIC) 7.5 mg tablet Take 1 Tab by mouth daily. (Patient taking differently: Take 7.5 mg by mouth daily as needed.) 90 Tab 0    BIOTIN PO Take  by mouth.  multivitamin (ONE A DAY) tablet Take 1 Tab by mouth daily.  Synthroid 150 mcg tablet Take 150 mcg by mouth daily.  aspirin delayed-release 81 mg tablet Take  by mouth daily.  cetirizine (ZYRTEC) 10 mg tablet Take 10 mg by mouth daily.  pitavastatin calcium (LIVALO) 2 mg tablet Take  by mouth daily.  pantoprazole (PROTONIX) 40 mg tablet Take  by mouth.  REFRESH TEARS 0.5 % drop ophthalmic solution       omega-3 fatty acids-vitamin e 1,000 mg cap Take 1 Cap by mouth two (2) times a day.           ALLERGIES    Allergies   Allergen Reactions    Septra [Sulfamethoprim] Other (comments)     Upset stomach, makes patient real sick          SOCIAL HISTORY    Social History     Socioeconomic History    Marital status:    Tobacco Use    Smoking status: Light Tobacco Smoker    Smokeless tobacco: Never Used   Vaping Use    Vaping Use: Never used   Substance and Sexual Activity    Alcohol use: Not Currently    Drug use: No    Sexual activity: Yes       FAMILY HISTORY    Family History   Problem Relation Age of Onset    Heart Attack Brother     High Cholesterol Father     OSTEOARTHRITIS Father     Breast Cancer Sister     Lung Cancer Mother     Heart Attack Sister     Heart Attack Brother          REVIEW OF SYSTEMS  Review of Systems   Constitutional: Negative for chills, fever and weight loss. Respiratory: Negative for shortness of breath. Cardiovascular: Negative for chest pain. Gastrointestinal: Negative for constipation. Negative for fecal incontinence    Genitourinary: Negative for dysuria. Negative for urinary incontinence   Musculoskeletal: Positive for back pain ( Thoracic & lumbar). Skin: Negative for rash. Neurological: Negative for dizziness, tingling, tremors, focal weakness and headaches. Endo/Heme/Allergies: Does not bruise/bleed easily. Psychiatric/Behavioral: The patient does not have insomnia. PHYSICAL EXAMINATION  Visit Vitals  Pulse 78   Temp 97.6 °F (36.4 °C) (Temporal)   Resp 16   Ht 5' 2\" (1.575 m)   SpO2 98%   BMI 29.81 kg/m²       Pain Assessment  1/25/2022   Location of Pain Back;Knee   Location Modifiers Right;Left   Severity of Pain 6   Quality of Pain Aching; [place] M; Other (Comment)   Quality of Pain Comment stabbing   Duration of Pain Persistent   Frequency of Pain Constant   Date Pain First Started -   Date Pain First Started Comment -   Aggravating Factors Bending; Other (Comment)   Aggravating Factors Comment -   Limiting Behavior Some   Relieving Factors Other (Comment);NSAID   Relieving Factors Comment -   Result of Injury -   Work-Related Injury -   Type of Injury -           Constitutional:  Well developed, well nourished, in no acute distress. Psychiatric: Affect and mood are appropriate. Integumentary: No rashes or abrasions noted on exposed areas. SPINE/MUSCULOSKELETAL EXAM       Lumbar spine:  No rash, ecchymosis, or gross obliquity. No fasciculations. No focal atrophy is noted. No pain with hip ROM. Full range of motion. Mild tenderness to palpation. No tenderness to palpation at the sciatic notch. SI joint tender on the left. Trochanters non tender.     Sensation in the bilateral legs grossly intact to light touch.             LEFT RIGHT   CHADD TEST + -   P4 TEST + -   COMPRESSION TEST - -   DISTRACTION TEST - -   GAENSLEN'S TEST + -         Updates 12/15/2020:  Positive Olivares sign on the R    Updates 1/25/2022:  Tenderness to palpation of thoracolumbar paraspinals (L>R)     MOTOR:      Biceps  Triceps Deltoids Wrist Ext Wrist Flex Hand Intrin   Right 5/5 5/5 5/5 5/5 5/5 5/5   Left 5/5 5/5 5/5 5/5 5/5 5/5             Hip Flex  Quads Hamstrings Ankle DF EHL Ankle PF   Right 5/5 5/5 5/5 5/5 5/5 5/5   Left 5/5 5/5 5/5 5/5 5/5 5/5     DTRs are 2+ biceps, triceps, brachioradialis, patella, and Achilles.     Negative Straight Leg raise. Squat not tested. No difficulty with tandem gait.       Ambulation without assistive device. FWB.       RADIOGRAPHS/DATA  Lumbar MRI images taken on 11/4/2020 personally reviewed with patient:  Straightening of the lordosis, stable. No significant  spondylolisthesis. Normal vertebral body heights. Stable hemangioma in the L2 vertebral body. The conus medullaris is normal in signal and caliber ending at the upper L1 level. Paraspinal soft tissues are unremarkable.     Spinal canal and neural foramen:      T12-L1: Disc is unremarkable. No spinal canal or foraminal stenosis.     L1-2: Minimal disc bulge. No spinal canal or foraminal stenosis.     L2-3: Mild disc bulge. No spinal canal stenosis but there is trace narrowing of the neural foramen.     L3-4: Mild disc bulge and facet DJD. No spinal canal stenosis but there is trace/mild foraminal narrowing, stable.     L4-5: Slightly larger central disc protrusion which again contacts but does not clearly distort or compress the crossing L5 nerve roots.  Mild right greater than left facet DJD. Relative mild spinal canal narrowing. Nonstenotic left neural  foramen. Moderate right foraminal stenosis, stable. There is contact of the  right L4 nerve root.     L5-S1: Mild disc bulge and right facet hypertrophy. 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 Partially conjoined left L5 and S1 nerve roots and  right S1 and S2 nerve roots. Similar appearing left foraminal stenosis with disc  osteophyte compressing the late exiting L5 nerve root. Early exiting left S1  nerve root at the lateral recess without definite compression. Stable mild to  moderate right foraminal stenosis. No central spinal canal narrowing.     IMPRESSION  IMPRESSION:     1.  Slightly larger disc protrusion at L4-5 which causes relative mild spinal  canal narrowing. Protrusion contacts but does not clearly compress the crossing  L5 nerve roots. 2.  Anomalous appearing left L5-S1 facet in conjunction with a disc osteophyte  complex causing severe left foraminal stenosis with compression of the partially  conjoined and late exiting L5 nerve root, stable. 3.  Stable moderate right foraminal stenosis at L4-5. 4.  Stable mild to moderate right foraminal stenosis at L5-S1.     RUE EMG 2/28/2020:  NCV & EMG Findings:  All nerve conduction studies (as indicated in the following tables) were within normal limits.       All examined muscles (as indicated in the following table) showed no evidence of electrical instability     INTERPRETATION  This was a normal nerve conduction and EMG study showing there to be no signs of neuropathy, myopathy, or radiculopathy in the nerves and muscles tested.     17 minutes of face-to-face contact were spent with the patient during today's visit extensively discussing symptoms and treatment plan. All questions were answered. More than half of this visit today was spent on counseling.      Written by Christina Mixon as dictated by Elena Carl MD

## 2022-03-19 PROBLEM — M53.3 PAIN OF LEFT SACROILIAC JOINT: Status: ACTIVE | Noted: 2017-08-21

## 2022-03-19 PROBLEM — Z82.49 FAMILY HISTORY OF EARLY CAD: Status: ACTIVE | Noted: 2019-02-13

## 2022-04-04 ENCOUNTER — OFFICE VISIT (OUTPATIENT)
Dept: ORTHOPEDIC SURGERY | Age: 72
End: 2022-04-04
Payer: MEDICARE

## 2022-04-04 VITALS
HEIGHT: 62 IN | HEART RATE: 88 BPM | OXYGEN SATURATION: 98 % | BODY MASS INDEX: 29.81 KG/M2 | WEIGHT: 162 LBS | TEMPERATURE: 97 F

## 2022-04-04 DIAGNOSIS — M48.061 SPINAL STENOSIS OF LUMBAR REGION, UNSPECIFIED WHETHER NEUROGENIC CLAUDICATION PRESENT: ICD-10-CM

## 2022-04-04 DIAGNOSIS — M54.6 THORACIC SPINE PAIN: ICD-10-CM

## 2022-04-04 DIAGNOSIS — M51.26 PROTRUSION OF LUMBAR INTERVERTEBRAL DISC: Primary | ICD-10-CM

## 2022-04-04 PROCEDURE — G8399 PT W/DXA RESULTS DOCUMENT: HCPCS | Performed by: NURSE PRACTITIONER

## 2022-04-04 PROCEDURE — G8427 DOCREV CUR MEDS BY ELIG CLIN: HCPCS | Performed by: NURSE PRACTITIONER

## 2022-04-04 PROCEDURE — 3017F COLORECTAL CA SCREEN DOC REV: CPT | Performed by: NURSE PRACTITIONER

## 2022-04-04 PROCEDURE — 99213 OFFICE O/P EST LOW 20 MIN: CPT | Performed by: NURSE PRACTITIONER

## 2022-04-04 PROCEDURE — G8536 NO DOC ELDER MAL SCRN: HCPCS | Performed by: NURSE PRACTITIONER

## 2022-04-04 PROCEDURE — G8432 DEP SCR NOT DOC, RNG: HCPCS | Performed by: NURSE PRACTITIONER

## 2022-04-04 PROCEDURE — G9899 SCRN MAM PERF RSLTS DOC: HCPCS | Performed by: NURSE PRACTITIONER

## 2022-04-04 PROCEDURE — G8419 CALC BMI OUT NRM PARAM NOF/U: HCPCS | Performed by: NURSE PRACTITIONER

## 2022-04-04 PROCEDURE — G8756 NO BP MEASURE DOC: HCPCS | Performed by: NURSE PRACTITIONER

## 2022-04-04 PROCEDURE — 1101F PT FALLS ASSESS-DOCD LE1/YR: CPT | Performed by: NURSE PRACTITIONER

## 2022-04-04 PROCEDURE — 1090F PRES/ABSN URINE INCON ASSESS: CPT | Performed by: NURSE PRACTITIONER

## 2022-04-04 NOTE — PROGRESS NOTES
Deja Rodas presents today for   Chief Complaint   Patient presents with    Back Pain       Is someone accompanying this pt? no    Is the patient using any DME equipment during OV? no    Depression Screening:  3 most recent PHQ Screens 8/2/2021   PHQ Not Done -   Little interest or pleasure in doing things Not at all   Feeling down, depressed, irritable, or hopeless Not at all   Total Score PHQ 2 0       Learning Assessment:  Learning Assessment 8/2/2021   PRIMARY LEARNER Patient   PRIMARY LANGUAGE ENGLISH   LEARNER PREFERENCE PRIMARY DEMONSTRATION   ANSWERED BY patient   RELATIONSHIP SELF       Abuse Screening:  Abuse Screening Questionnaire 8/2/2021   Do you ever feel afraid of your partner? N   Are you in a relationship with someone who physically or mentally threatens you? N   Is it safe for you to go home? Y       Fall Risk  Fall Risk Assessment, last 12 mths 8/2/2021   Able to walk? Yes   Fall in past 12 months? 0   Do you feel unsteady? 0   Are you worried about falling 0   Number of falls in past 12 months -   Fall with injury? -       OPIOID RISK TOOL  No flowsheet data found. Coordination of Care:  1. Have you been to the ER, urgent care clinic since your last visit? no  Hospitalized since your last visit? no    2. Have you seen or consulted any other health care providers outside of the [address] [place] [address] since your last visit? no Include any pap smears or colon screening.  no

## 2022-04-04 NOTE — PROGRESS NOTES
Chief complaint   Chief Complaint   Patient presents with    Back Pain       History of Present Illness:  Jennifer Perez is a  71 y.o.  female who comes in today for follow-up of her low back pain and her thoracic pain. Last visit she was having some pain around the bra line and she has been doing Pilates and yoga. She has completed physical therapy in the past.  She takes Mobic 7.5 mg as needed. She will also use Voltaren gel. She states she is doing well that both her thoracic and her low back pain are improved. Time she gets pain if she agrees what activities such as gardening or housecleaning. She used to get pain that radiated into the right upper thigh that is also improved. She will take a Tylenol as needed. She denies fever bowel bladder dysfunction. Physical Exam: The patient is a 67-year-old female well-developed well-nourished who is alert and oriented with a normal mood and affect. She has a full weightbearing nonantalgic gait. She did not use any assist device. She has 4 out of 5 strength bilateral upper and lower extremities. Negative straight leg raise. Negative Luis Miguel's. Assessment and Plan: This is a patient who has thoracic and back pain. Her sciatica is resolved. She is doing well on as needed Mobic 7.5. If she needs to she can take it twice a day. She will continue her Voltaren gel as needed. We will see her back in 6 months sooner if needed. Medications:  Current Outpatient Medications   Medication Sig Dispense Refill    meloxicam (MOBIC) 7.5 mg tablet Take 1 Tablet by mouth daily. 90 Tablet 1    cyanocobalamin (Vitamin B-12) 1,000 mcg tablet Take 1,000 mcg by mouth daily.  cholecalciferol (Vitamin D3) (1000 Units /25 mcg) tablet Take 1,000 Units by mouth daily.  mirabegron ER (Myrbetriq) 50 mg ER tablet Take 1 Tablet by mouth daily. 90 Tablet 3    BIOTIN PO Take  by mouth.  multivitamin (ONE A DAY) tablet Take 1 Tab by mouth daily.       Synthroid 150 mcg tablet Take 150 mcg by mouth daily.  aspirin delayed-release 81 mg tablet Take  by mouth daily.  cetirizine (ZYRTEC) 10 mg tablet Take 10 mg by mouth daily.  pantoprazole (PROTONIX) 40 mg tablet Take  by mouth.  REFRESH TEARS 0.5 % drop ophthalmic solution       omega-3 fatty acids-vitamin e 1,000 mg cap Take 1 Cap by mouth two (2) times a day.  albuterol (PROVENTIL HFA, VENTOLIN HFA, PROAIR HFA) 90 mcg/actuation inhaler inhale 2 puffs by mouth and INTO THE LUNGS every 6 hours if needed for cough (Patient not taking: Reported on 4/4/2022)      telmisartan (Micardis) 80 mg tablet Take 80 mg by mouth daily. (Patient not taking: Reported on 4/4/2022)      pitavastatin calcium (LIVALO) 2 mg tablet Take  by mouth daily. (Patient not taking: Reported on 4/4/2022)             Review of systems:    Past Medical History:   Diagnosis Date    Acquired ventricular hypertrophy     Allergic rhinitis     Bladder incontinence     Cardiac echocardiogram 05/11/2016    On-Sight Ultrasound:  EF 48%. No RWMA on segments seen. Mild conc LVH. Gr 1 DDfx. Normal RVSP. Mild TR.  Cardiac nuclear imaging test, low risk 06/15/2016    Low risk. No ischemia or prior infarction. No RWMA. EF 71%. Neg EKG on maximal EST. Ex time 8 min 45 sec.     Cardiomyopathy ([address]     Cardiovascular disease     Esophageal reflux     Essential hypertension     Hyperlipidemia     Numerous moles     Sleep apnea     Thyroid disease     Vitamin D deficiency      Past Surgical History:   Procedure Laterality Date    HX CATARACT REMOVAL      HX LEEP PROCEDURE      MI APPENDECTOMY       Social History     Socioeconomic History    Marital status:      Spouse name: Not on file    Number of children: Not on file    Years of education: Not on file    Highest education level: Not on file   Occupational History    Not on file   Tobacco Use    Smoking status: Light Tobacco Smoker    Smokeless tobacco: Never Used   Vaping Use    Vaping Use: Never used   Substance and Sexual Activity    Alcohol use: Not Currently    Drug use: No    Sexual activity: Yes   Other Topics Concern    Not on file   Social History Narrative    Not on file     Social Determinants of Health     Financial Resource Strain:     Difficulty of Paying Living Expenses: Not on file   Food Insecurity:     Worried About Running Out of Food in the Last Year: Not on file    Katharine of Food in the Last Year: Not on file   Transportation Needs:     Lack of Transportation (Medical): Not on file    Lack of Transportation (Non-Medical):  Not on file   Physical Activity:     Days of Exercise per Week: Not on file    Minutes of Exercise per Session: Not on file   Stress:     Feeling of Stress : Not on file   Social Connections:     Frequency of Communication with Friends and Family: Not on file    Frequency of Social Gatherings with Friends and Family: Not on file    Attends Nondenominational Services: Not on file    Active Member of [address] [place] [address] or Organizations: Not on file    Attends Club or Organization Meetings: Not on file    Marital Status: Not on file   Intimate Partner Violence:     Fear of Current or Ex-Partner: Not on file    Emotionally Abused: Not on file    Physically Abused: Not on file    Sexually Abused: Not on file   Housing Stability:     Unable to Pay for Housing in the Last Year: Not on file    Number of [place] in the Last Year: Not on file    Unstable Housing in the Last Year: Not on file     Family History   Problem Relation Age of Onset    Heart Attack Brother     High Cholesterol Father     OSTEOARTHRITIS Father     Breast Cancer Sister     Lung Cancer Mother     Heart Attack Sister     Heart Attack Brother        Physical Exam:  Visit Vitals  Pulse 88   Temp 97 °F (36.1 °C) (Temporal)   Ht 5' 2\" (1.575 m)   Wt 162 lb (73.5 kg)   SpO2 98%   BMI 29.63 kg/m²     Pain Scale: 4/10       has been .reviewed and is appropriate          Diagnoses and all orders for this visit:    1. Protrusion of lumbar intervertebral disc    2. Spinal stenosis of lumbar region, unspecified whether neurogenic claudication present    3. Thoracic spine pain            Follow-up and Dispositions    · Return in about 6 months (around 10/4/2022) for with Dr. Erika Eli.              We have informed Mainor Nicolas to notify us for immediate appointment if she has any worsening neurogical symptoms or if an emergency situation presents, then call [phone]

## 2022-05-26 ENCOUNTER — TRANSCRIBE ORDER (OUTPATIENT)
Dept: SCHEDULING | Age: 72
End: 2022-05-26

## 2022-05-26 DIAGNOSIS — Z12.31 SCREENING MAMMOGRAM, ENCOUNTER FOR: Primary | ICD-10-CM

## 2022-06-20 ENCOUNTER — HOSPITAL ENCOUNTER (OUTPATIENT)
Dept: MAMMOGRAPHY | Age: 72
Discharge: HOME | End: 2022-06-20
Attending: FAMILY MEDICINE
Payer: MEDICARE

## 2022-06-20 DIAGNOSIS — Z12.31 SCREENING MAMMOGRAM, ENCOUNTER FOR: ICD-10-CM

## 2022-06-20 PROCEDURE — 77063 BREAST TOMOSYNTHESIS BI: CPT

## 2022-09-02 ENCOUNTER — OFFICE VISIT (OUTPATIENT)
Dept: CARDIOLOGY CLINIC | Age: 72
End: 2022-09-02
Payer: MEDICARE

## 2022-09-02 VITALS
HEART RATE: 102 BPM | HEIGHT: 61 IN | DIASTOLIC BLOOD PRESSURE: 82 MMHG | TEMPERATURE: 97.8 F | SYSTOLIC BLOOD PRESSURE: 114 MMHG | WEIGHT: 163 LBS | RESPIRATION RATE: 18 BRPM | OXYGEN SATURATION: 99 % | BODY MASS INDEX: 30.78 KG/M2

## 2022-09-02 DIAGNOSIS — R94.31 ABNORMAL EKG: Primary | ICD-10-CM

## 2022-09-02 DIAGNOSIS — R06.09 DOE (DYSPNEA ON EXERTION): ICD-10-CM

## 2022-09-02 DIAGNOSIS — I10 ESSENTIAL HYPERTENSION: ICD-10-CM

## 2022-09-02 DIAGNOSIS — E78.5 DYSLIPIDEMIA: ICD-10-CM

## 2022-09-02 DIAGNOSIS — Z82.49 FAMILY HISTORY OF EARLY CAD: ICD-10-CM

## 2022-09-02 PROCEDURE — 1123F ACP DISCUSS/DSCN MKR DOCD: CPT | Performed by: INTERNAL MEDICINE

## 2022-09-02 PROCEDURE — G8752 SYS BP LESS 140: HCPCS | Performed by: INTERNAL MEDICINE

## 2022-09-02 PROCEDURE — G8427 DOCREV CUR MEDS BY ELIG CLIN: HCPCS | Performed by: INTERNAL MEDICINE

## 2022-09-02 PROCEDURE — 93000 ELECTROCARDIOGRAM COMPLETE: CPT | Performed by: INTERNAL MEDICINE

## 2022-09-02 PROCEDURE — 99214 OFFICE O/P EST MOD 30 MIN: CPT | Performed by: INTERNAL MEDICINE

## 2022-09-02 PROCEDURE — 1090F PRES/ABSN URINE INCON ASSESS: CPT | Performed by: INTERNAL MEDICINE

## 2022-09-02 PROCEDURE — G9899 SCRN MAM PERF RSLTS DOC: HCPCS | Performed by: INTERNAL MEDICINE

## 2022-09-02 PROCEDURE — 3017F COLORECTAL CA SCREEN DOC REV: CPT | Performed by: INTERNAL MEDICINE

## 2022-09-02 PROCEDURE — G8417 CALC BMI ABV UP PARAM F/U: HCPCS | Performed by: INTERNAL MEDICINE

## 2022-09-02 PROCEDURE — 1101F PT FALLS ASSESS-DOCD LE1/YR: CPT | Performed by: INTERNAL MEDICINE

## 2022-09-02 PROCEDURE — G8754 DIAS BP LESS 90: HCPCS | Performed by: INTERNAL MEDICINE

## 2022-09-02 PROCEDURE — G8510 SCR DEP NEG, NO PLAN REQD: HCPCS | Performed by: INTERNAL MEDICINE

## 2022-09-02 PROCEDURE — G8399 PT W/DXA RESULTS DOCUMENT: HCPCS | Performed by: INTERNAL MEDICINE

## 2022-09-02 PROCEDURE — G8536 NO DOC ELDER MAL SCRN: HCPCS | Performed by: INTERNAL MEDICINE

## 2022-09-02 NOTE — PROGRESS NOTES
HISTORY OF PRESENT ILLNESS  Maira Ozuna is a 71 y.o. female. Follow-up  Associated symptoms include shortness of breath. Pertinent negatives include no chest pain, no abdominal pain and no headaches. Patient presents for a follow-up office visit. She has a past medical history significant for hypertension, dyslipidemia, and hypothyroidism. She also reports a strong family history for coronary artery disease. She was also a smoker up until 2016. Patient last underwent an exercise nuclear stress test in June 2016 at our office which was a normal study. Patient exercised for 8 minutes 45 seconds without EKG changes or symptoms. Her ejection fraction was normal.  There is no evidence of ischemia. More recently, she underwent an echocardiogram in September 2018 which was essentially normal.  EF 60%, no valvular heart disease with exception of mild aortic valve calcification without stenosis. She underwent ABIs in August 2020 to evaluate bilateral leg pain. This was negative for PAD. She was last seen in our office approximately 13 months ago. Since last visit, her biggest issue is back pain. She also admits to occasional exertional dyspnea. She has had additional siblings that have been diagnosed with significant coronary disease as well over the past year. She has not noted any chest pain or pressure. Past Medical History:   Diagnosis Date    Acquired ventricular hypertrophy     Allergic rhinitis     Bladder incontinence     Cardiac echocardiogram 05/11/2016    On-Sight Ultrasound:  EF 48%. No RWMA on segments seen. Mild conc LVH. Gr 1 DDfx. Normal RVSP. Mild TR. Cardiac nuclear imaging test, low risk 06/15/2016    Low risk. No ischemia or prior infarction. No RWMA. EF 71%. Neg EKG on maximal EST. Ex time 8 min 45 sec.     Cardiomyopathy [facility])     Cardiovascular disease     Esophageal reflux     Essential hypertension     Hyperlipidemia     Numerous moles     Sleep apnea Thyroid disease     Vitamin D deficiency       Current Outpatient Medications   Medication Sig Dispense Refill    mirabegron ER (Myrbetriq) 50 mg ER tablet Take 1 Tablet by mouth daily. 90 Tablet 3    albuterol (PROVENTIL HFA, VENTOLIN HFA, PROAIR HFA) 90 mcg/actuation inhaler inhale 2 puffs by mouth and INTO THE LUNGS every 6 hours if needed for cough      meloxicam (MOBIC) 7.5 mg tablet Take 1 Tablet by mouth daily. 90 Tablet 1    telmisartan (MICARDIS) 80 mg tablet Take 80 mg by mouth daily. cyanocobalamin 1,000 mcg tablet Take 1,000 mcg by mouth daily. cholecalciferol (VITAMIN D3) (1000 Units /25 mcg) tablet Take 1,000 Units by mouth daily. BIOTIN PO Take  by mouth.      multivitamin (ONE A DAY) tablet Take 1 Tab by mouth daily. Synthroid 150 mcg tablet Take 150 mcg by mouth daily. aspirin delayed-release 81 mg tablet Take  by mouth daily. cetirizine (ZYRTEC) 10 mg tablet Take 10 mg by mouth daily. pitavastatin calcium (LIVALO) 2 mg tablet Take  by mouth daily. pantoprazole (PROTONIX) 40 mg tablet Take  by mouth. REFRESH TEARS 0.5 % drop ophthalmic solution       omega-3 fatty acids-vitamin e 1,000 mg cap Take 1 Cap by mouth two (2) times a day. Allergies   Allergen Reactions    Septra [Sulfamethoprim] Other (comments)     Upset stomach, makes patient real sick        Social History     Tobacco Use    Smoking status: Light Smoker    Smokeless tobacco: Never   Vaping Use    Vaping Use: Never used   Substance Use Topics    Alcohol use: Not Currently    Drug use: No       Family History   Problem Relation Age of Onset    Heart Attack Brother     High Cholesterol Father     OSTEOARTHRITIS Father     Breast Cancer Sister     Lung Cancer Mother     Heart Attack Sister     Heart Attack Brother           Review of Systems   Constitutional:  Negative for chills, fever and weight loss. HENT:  Negative for nosebleeds.     Eyes:  Negative for blurred vision and double vision. Respiratory:  Positive for shortness of breath. Negative for cough and wheezing. Cardiovascular:  Negative for chest pain, palpitations, orthopnea, claudication, leg swelling and PND. Gastrointestinal:  Negative for abdominal pain, heartburn, nausea and vomiting. Genitourinary:  Negative for dysuria and hematuria. Musculoskeletal:  Negative for falls and myalgias. Skin:  Negative for rash. Neurological:  Negative for dizziness, focal weakness and headaches. Endo/Heme/Allergies:  Does not bruise/bleed easily. Psychiatric/Behavioral:  Negative for substance abuse. Visit Vitals  /82 (BP 1 Location: Left upper arm, BP Patient Position: Sitting)   Pulse (!) 102   Temp 97.8 °F (36.6 °C) (Temporal)   Resp 18   Ht 5' 1\" (1.549 m)   Wt 73.9 kg (163 lb)   SpO2 99%   BMI 30.80 kg/m²      Physical Exam  Constitutional:       Appearance: She is well-developed. HENT:      Head: Normocephalic and atraumatic. Eyes:      Conjunctiva/sclera: Conjunctivae normal.   Neck:      Vascular: No carotid bruit or JVD. Cardiovascular:      Rate and Rhythm: Normal rate and regular rhythm. Pulses: Normal pulses. Heart sounds: S1 normal and S2 normal. No murmur heard. No gallop. Pulmonary:      Effort: Pulmonary effort is normal.      Breath sounds: Normal breath sounds. No wheezing or rales. Abdominal:      General: Bowel sounds are normal.      Palpations: Abdomen is soft. Tenderness: There is no abdominal tenderness. Musculoskeletal:         General: No swelling or tenderness. Cervical back: Neck supple. Skin:     General: Skin is warm and dry. Neurological:      General: No focal deficit present. Mental Status: She is alert and oriented to person, place, and time. Psychiatric:         Behavior: Behavior normal.         Thought Content:  Thought content normal.     EKG: Normal sinus rhythm, normal axis, normal QTc interval, borderline low voltage, poor R wave progression, nonspecific T wave abnormality. Compared to the previous EKG, no significant interval change. ASSESSMENT and PLAN    Dyspnea on exertion. This may just be due to deconditioning, but given her strong family history of coronary disease and multiple cardiac risk factors, have recommended repeating a pharmacologic nuclear stress test for further restratification. Essential hypertension. This remains well controlled on her current medical regimen which includes Micardis as monotherapy. Dyslipidemia. Patient is now taking Livalo 2 mg daily. This is followed by her PCP. History of tobacco abuse. Patient quit smoking in 2016. She is congratulated for this and reminded to remain tobacco free. Patient can follow up annually, sooner if needed.

## 2022-09-19 ENCOUNTER — TELEPHONE (OUTPATIENT)
Dept: CARDIOLOGY CLINIC | Age: 72
End: 2022-09-19

## 2022-09-21 ENCOUNTER — TELEPHONE (OUTPATIENT)
Dept: CARDIOLOGY CLINIC | Age: 72
End: 2022-09-21

## 2022-09-21 NOTE — TELEPHONE ENCOUNTER
Patient made aware of stress test results and Dr. Julianne Walton remarks. No questions or concerns at present.

## 2022-10-05 NOTE — PROGRESS NOTES
[address], [address]  [place], [address] [place] [address] Street  Phone: [phone]  Fax: [phone]       Zeyad Gomez  : 1950  PCP: Adonis Verma MD  10/6/2022    PROGRESS NOTE    HISTORY OF PRESENT ILLNESS  Delta Fall is a 71 y.o. F who was seen as a new patient 2020 with  c/o low back pain bilaterally, but more localized towards the left SI joint and buttock x 3 years. Her pain seemed to flare up every summer. She has tried Flexeril and OTC NSAIDs without benefit. She also c/o pain and bruising near her sacrum. She was previously seen in 2017 by Dr. Yajaira Gilbert and she was given left iliolumbar trigger point injections without benefit. She was also given Gabapentin. She attended PT () with benefit with US and laser therapy. She failed to return for follow-up. Lumbar spine MRI dated 2020 reviewed. Per report, Slightly larger disc protrusion at L4-5 which causes relative mild spinal canal narrowing. Protrusion contacts but does not clearly compress the crossing L5 nerve roots. Anomalous appearing left L5-S1 facet in conjunction with a disc osteophyte complex causing severe left foraminal stenosis with compression of the partially conjoined and late exiting L5 nerve root, stable. Stable moderate right foraminal stenosis at L4-5. Stable mild to moderate right foraminal stenosis at L5-S1. She underwent a Left L5 TFESI (2020; Dr. Chandrakant Nuñez) with benefit after 1 week. She found the most benefit with PT (20-21; [facility]). She also c/o right shoulder pain and notes that she has an MRI that revealed a rotator cuff tear. She found benefit from an injection 6 years ago. She has been under the care of Dr. Jeaneth Bah for her right shoulder pain. She was advised she likely needs a surgery, but she is not inclined as her pain is not intolerable. She uses Voltaren gel with benefit.  Today, she c/o new pain around her bra line in the thoracic area. She had a rib XR that did not reveal any acute injury. The pain is sometimes so sharp that she has difficulty breathing. She uses Voltaren gel. She continues to have low back and buttock pain. She uses a CPAP machine, and she sleeps on her sides. She uses Mobic with benefit. Her massage therapist often tells her she is very tense. She maintains an HEP of yoga. Ivone Fischer was seen today for lower back pain (L > R), with onset of this past summer. Notes that pain was so severe that she had difficulty walking. She has tried acupuncture, chiropractor care, and PT, toradol injection treatment (3 weeks ago) with no benefit. She has tried Mobic with no benefit. Tylenol provides some relief. Notes that KAUSHIK injection treatment provided great relief for a year, but pain has returned. Notes that Voltaren provides relief. Pain Score: 3/10     Treatments patient has tried:  Physical therapy:Yes - 2021 with benefit  Doing HEP: Yes  Non-opioid medications: Yes - Mobic, Voltaren gel  Spinal injections:  Yes - left L5 TFESI (2020) with benefit  Spinal surgery- No.   Last Lumbar MRI 2020     PmHx: Cardiomyopathy, hypothyroidism    ASSESSMENT  Ivone Fischer comes in to the office today c/o lower back pain (L > R). Symptoms may be due to sacroiliitis on the left side vs lumbar radiculopathy based on MRI, previous injection treatment with L5 TFESI, and tenderness of left SI joint. Pt had 3/5 positive left SI joint tests and 1/5 positive right SI joint tests. PLAN  Order of Left L5 TFESI  If TFESI is ineffective, will consider Sacroilliac joint injection treatment  Will consider surgical consult if sxs persist if injection treatments are ineffective, patient would like to avoid generally    Pt will f/u in 2-4 weeks after injection or sooner if needed. Diagnoses and all orders for this visit:    1. Mechanical low back pain    2. Myofascial pain    3. Lumbar spine pain    4. Sacroiliitis ([address]    5. Lumbar radiculopathy             PAST MEDICAL HISTORY   Past Medical History:   Diagnosis Date    Acquired ventricular hypertrophy     Allergic rhinitis     Bladder incontinence     Cardiac echocardiogram 05/11/2016    On-Sight Ultrasound:  EF 48%. No RWMA on segments seen. Mild conc LVH. Gr 1 DDfx. Normal RVSP. Mild TR. Cardiac nuclear imaging test, low risk 06/15/2016    Low risk. No ischemia or prior infarction. No RWMA. EF 71%. Neg EKG on maximal EST. Ex time 8 min 45 sec. Cardiomyopathy ([address]     Cardiovascular disease     Esophageal reflux     Essential hypertension     Hyperlipidemia     Numerous moles     Sleep apnea     Thyroid disease     Vitamin D deficiency        Past Surgical History:   Procedure Laterality Date    HX CATARACT REMOVAL      HX LEEP PROCEDURE      GA APPENDECTOMY         MEDICATIONS      Current Outpatient Medications   Medication Sig Dispense Refill    mirabegron ER (Myrbetriq) 50 mg ER tablet Take 1 Tablet by mouth daily. 90 Tablet 3    meloxicam (MOBIC) 7.5 mg tablet Take 1 Tablet by mouth daily. 90 Tablet 1    telmisartan (MICARDIS) 80 mg tablet Take 80 mg by mouth daily. cyanocobalamin 1,000 mcg tablet Take 1,000 mcg by mouth daily. cholecalciferol (VITAMIN D3) (1000 Units /25 mcg) tablet Take 1,000 Units by mouth daily. Synthroid 150 mcg tablet Take 150 mcg by mouth daily. cetirizine (ZYRTEC) 10 mg tablet Take 10 mg by mouth daily. pitavastatin calcium (LIVALO) 2 mg tablet Take  by mouth daily. REFRESH TEARS 0.5 % drop ophthalmic solution       omega-3 fatty acids-vitamin e 1,000 mg cap Take 1 Cap by mouth two (2) times a day. albuterol (PROVENTIL HFA, VENTOLIN HFA, PROAIR HFA) 90 mcg/actuation inhaler inhale 2 puffs by mouth and INTO THE LUNGS every 6 hours if needed for cough (Patient not taking: Reported on 10/6/2022)      BIOTIN PO Take  by mouth.  (Patient not taking: Reported on 10/6/2022)      multivitamin (ONE A DAY) tablet Take 1 Tab by mouth daily. (Patient not taking: Reported on 10/6/2022)      aspirin delayed-release 81 mg tablet Take  by mouth daily. (Patient not taking: Reported on 10/6/2022)      pantoprazole (PROTONIX) 40 mg tablet Take  by mouth. (Patient not taking: Reported on 10/6/2022)         ALLERGIES    Allergies   Allergen Reactions    Septra [Sulfamethoprim] Other (comments)     Upset stomach, makes patient real sick          SOCIAL HISTORY    Social History     Socioeconomic History    Marital status:    Tobacco Use    Smoking status: Light Smoker    Smokeless tobacco: Never   Vaping Use    Vaping Use: Never used   Substance and Sexual Activity    Alcohol use: Not Currently    Drug use: No    Sexual activity: Yes       FAMILY HISTORY    Family History   Problem Relation Age of Onset    Heart Attack Brother     High Cholesterol Father     OSTEOARTHRITIS Father     Breast Cancer Sister     Lung Cancer Mother     Heart Attack Sister     Heart Attack Brother        REVIEW OF SYSTEMS  Review of Systems   Constitutional:  Negative for chills, fever and weight loss. Respiratory:  Negative for shortness of breath. Cardiovascular:  Negative for chest pain. Gastrointestinal:  Negative for constipation. Genitourinary:  Negative for dysuria. Musculoskeletal:  Positive for back pain and joint pain (sacroiliac joint). Skin:  Negative for rash. Neurological:  Negative for dizziness, tingling, tremors, focal weakness and headaches. Endo/Heme/Allergies:  Does not bruise/bleed easily. Psychiatric/Behavioral:  The patient does not have insomnia.       PHYSICAL EXAMINATION  Visit Vitals  Pulse 76   Temp 98.2 °F (36.8 °C) (Temporal)   Resp 16   Ht 5' 1\" (1.549 m)   Wt 162 lb (73.5 kg)   SpO2 96%   BMI 30.61 kg/m²       Pain Assessment  10/6/2022   Location of Pain Rib cage;Leg   Location Modifiers Left   Severity of Pain 3   Quality of Pain Throbbing; Sharp;Dull;Aching;Locking;Grinding; Popping;Cracking;Burning   Quality of Pain Comment -   Duration of Pain Persistent   Frequency of Pain Constant   Date Pain First Started 5/24/2022   Date Pain First Started Comment -   Aggravating Factors Walking;Standing   Aggravating Factors Comment -   Limiting Behavior Some   Relieving Factors Heat;Ice;Rest   Relieving Factors Comment -   Result of Injury No   Work-Related Injury -   Type of Injury -       Constitutional:  Well developed, well nourished, in no acute distress. Psychiatric: Affect and mood are appropriate. HEENT: Normocephalic, atraumatic. Extraocular movements intact. Integumentary: No rashes or abrasions noted on exposed areas. Cardiovascular: Regular rate and rhythm. Pulmonary: Clear to auscultation bilaterally. SPINE/MUSCULOSKELETAL EXAM    No rash, ecchymosis, or gross obliquity. No fasciculations. No focal atrophy is noted. No pain with hip ROM. Full range of motion. Tenderness to palpation of lower paraspinal musculature  No tenderness to palpation at the sciatic notch. SI joint tender on the left. Trochanters non tender. Sensation in the bilateral legs grossly intact to light touch.           LEFT RIGHT   CHADD TEST + -   P4 TEST + -   COMPRESSION TEST - -   DISTRACTION TEST - -   GAENSLEN'S TEST + -         Updates 12/15/2020:  Positive Olivares sign on the R     Updates 1/25/2022:  Tenderness to palpation of thoracolumbar paraspinals (L>R)    Updates 10/6/22:      LEFT RIGHT   CHADD TEST + -   P4 TEST - -   COMPRESSION TEST - -   PRONE EXTENSION TEST + -   GAENSLEN'S TEST + +          MOTOR:      Biceps  Triceps Deltoids Wrist Ext Wrist Flex Hand Intrin   Right 5/5 5/5 5/5 5/5 5/5 5/5   Left 5/5 5/5 5/5 5/5 5/5 5/5             Hip Flex  Quads Hamstrings Ankle DF EHL Ankle PF   Right 5/5 5/5 5/5 5/5 5/5 5/5   Left 5/5 5/5 5/5 5/5 5/5 5/5     DTRs are 2+ biceps, triceps, brachioradialis, patella, and Achilles. Negative Straight Leg raise. Squat not tested. No difficulty with tandem gait. Ambulation without assistive device. FWB. RADIOGRAPHS  Lumbar MRI images taken on 11/4/2020 personally reviewed with patient:  Straightening of the lordosis, stable. No significant  spondylolisthesis. Normal vertebral body heights. Stable hemangioma in the L2 vertebral body. The conus medullaris is normal in signal and caliber ending at the upper L1 level. Paraspinal soft tissues are unremarkable. Spinal canal and neural foramen:      T12-L1: Disc is unremarkable. No spinal canal or foraminal stenosis. L1-2: Minimal disc bulge. No spinal canal or foraminal stenosis. L2-3: Mild disc bulge. No spinal canal stenosis but there is trace narrowing of the neural foramen. L3-4: Mild disc bulge and facet DJD. No spinal canal stenosis but there is trace/mild foraminal narrowing, stable. L4-5: Slightly larger central disc protrusion which again contacts but does not clearly distort or compress the crossing L5 nerve roots. Mild right greater than left facet DJD. Relative mild spinal canal narrowing. Nonstenotic left neural  foramen. Moderate right foraminal stenosis, stable. There is contact of the  right L4 nerve root. L5-S1: Mild disc bulge and right facet hypertrophy. 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 Partially conjoined left L5 and S1 nerve roots and  right S1 and S2 nerve roots. Similar appearing left foraminal stenosis with disc  osteophyte compressing the late exiting L5 nerve root. Early exiting left S1  nerve root at the lateral recess without definite compression. Stable mild to  moderate right foraminal stenosis. No central spinal canal narrowing. IMPRESSION  IMPRESSION:     1. Slightly larger disc protrusion at L4-5 which causes relative mild spinal  canal narrowing. Protrusion contacts but does not clearly compress the crossing  L5 nerve roots.   2.  Anomalous appearing left L5-S1 facet in conjunction with a disc osteophyte  complex causing severe left foraminal stenosis with compression of the partially  conjoined and late exiting L5 nerve root, stable. 3.  Stable moderate right foraminal stenosis at L4-5.  4.  Stable mild to moderate right foraminal stenosis at L5-S1.     RUE EMG 2/28/2020:  NCV & EMG Findings:  All nerve conduction studies (as indicated in the following tables) were within normal limits. All examined muscles (as indicated in the following table) showed no evidence of electrical instability     INTERPRETATION  This was a normal nerve conduction and EMG study showing there to be no signs of neuropathy, myopathy, or radiculopathy in the nerves and muscles tested. 32 minutes of face-to-face contact were spent with the patient during today's visit extensively discussing symptoms and treatment plan. All questions were answered. More than half of this visit today was spent on counseling. Written by Latoya Espinosa, as dictated by Dr. Kuldeep Rockwell.

## 2022-10-06 ENCOUNTER — OFFICE VISIT (OUTPATIENT)
Dept: ORTHOPEDIC SURGERY | Age: 72
End: 2022-10-06
Payer: MEDICARE

## 2022-10-06 VITALS
WEIGHT: 162 LBS | OXYGEN SATURATION: 96 % | TEMPERATURE: 98.2 F | HEART RATE: 76 BPM | HEIGHT: 61 IN | RESPIRATION RATE: 16 BRPM | BODY MASS INDEX: 30.58 KG/M2

## 2022-10-06 DIAGNOSIS — M54.50 LUMBAR SPINE PAIN: ICD-10-CM

## 2022-10-06 DIAGNOSIS — M54.59 MECHANICAL LOW BACK PAIN: Primary | ICD-10-CM

## 2022-10-06 DIAGNOSIS — M79.18 MYOFASCIAL PAIN: ICD-10-CM

## 2022-10-06 DIAGNOSIS — M54.16 LUMBAR RADICULOPATHY: ICD-10-CM

## 2022-10-06 DIAGNOSIS — M46.1 SACROILIITIS (HCC): ICD-10-CM

## 2022-10-06 PROCEDURE — G9899 SCRN MAM PERF RSLTS DOC: HCPCS | Performed by: PHYSICAL MEDICINE & REHABILITATION

## 2022-10-06 PROCEDURE — G8756 NO BP MEASURE DOC: HCPCS | Performed by: PHYSICAL MEDICINE & REHABILITATION

## 2022-10-06 PROCEDURE — 1123F ACP DISCUSS/DSCN MKR DOCD: CPT | Performed by: PHYSICAL MEDICINE & REHABILITATION

## 2022-10-06 PROCEDURE — G8536 NO DOC ELDER MAL SCRN: HCPCS | Performed by: PHYSICAL MEDICINE & REHABILITATION

## 2022-10-06 PROCEDURE — G8427 DOCREV CUR MEDS BY ELIG CLIN: HCPCS | Performed by: PHYSICAL MEDICINE & REHABILITATION

## 2022-10-06 PROCEDURE — G8417 CALC BMI ABV UP PARAM F/U: HCPCS | Performed by: PHYSICAL MEDICINE & REHABILITATION

## 2022-10-06 PROCEDURE — 3017F COLORECTAL CA SCREEN DOC REV: CPT | Performed by: PHYSICAL MEDICINE & REHABILITATION

## 2022-10-06 PROCEDURE — 1090F PRES/ABSN URINE INCON ASSESS: CPT | Performed by: PHYSICAL MEDICINE & REHABILITATION

## 2022-10-06 PROCEDURE — 99214 OFFICE O/P EST MOD 30 MIN: CPT | Performed by: PHYSICAL MEDICINE & REHABILITATION

## 2022-10-06 PROCEDURE — G8399 PT W/DXA RESULTS DOCUMENT: HCPCS | Performed by: PHYSICAL MEDICINE & REHABILITATION

## 2022-10-06 PROCEDURE — 1101F PT FALLS ASSESS-DOCD LE1/YR: CPT | Performed by: PHYSICAL MEDICINE & REHABILITATION

## 2022-10-06 PROCEDURE — G8432 DEP SCR NOT DOC, RNG: HCPCS | Performed by: PHYSICAL MEDICINE & REHABILITATION

## 2022-10-06 NOTE — PROGRESS NOTES
Chief Complaint   Patient presents with    Follow-up       Pt preferred language for health care discussion is english. Is someone accompanying this pt? no    Is the patient using any DME equipment during OV? no    Depression Screening:  3 most recent [facility] 9/2/2022 8/2/2021 8/3/2020 6/8/2020 3/13/2020 3/2/2020 2/28/2020   PHQ Not Done - - - - Patient Decline - -   Little interest or pleasure in doing things Not at all Not at all Not at all Not at all - Not at all Not at all   Feeling down, depressed, irritable, or hopeless Not at all Not at all Not at all Not at all - Not at all Not at all   Total Score PHQ 2 0 0 0 0 - 0 0       Learning Assessment:  Learning Assessment 8/2/2021 1/31/2018 6/6/2016   PRIMARY LEARNER Patient Patient Patient   [address] [place] [address]   LEARNER PREFERENCE PRIMARY DEMONSTRATION DEMONSTRATION DEMONSTRATION   ANSWERED BY patient Patient Patient   RELATIONSHIP SELF SELF SELF       Abuse Screening:  Abuse Screening Questionnaire 8/2/2021 8/3/2020   Do you ever feel afraid of your partner? N N   Are you in a relationship with someone who physically or mentally threatens you? N N   Is it safe for you to go home? Y Y       Fall Risk  Fall Risk Assessment, last 12 mths 9/2/2022   Able to walk? Yes   Fall in past 12 months? 0   Do you feel unsteady? -   Are you worried about falling -   Number of falls in past 12 months -   Fall with injury? -           Advance Directive:  1. Do you have an advance directive in place? Patient Reply:no    2. If not, would you like material regarding how to put one in place? Patient Reply: no    2. Per patient no changes to their ACP contact no. Coordination of Care:  1. Have you been to the ER, urgent care clinic since your last visit? Hospitalized since your last visit? Yes [facility] for dizziness    2. Have you seen or consulted any other health care providers outside of the [address] [place] [address] since your last visit? Include any pap smears or colon screening.  no

## 2022-10-26 ENCOUNTER — TELEPHONE (OUTPATIENT)
Dept: ORTHOPEDIC SURGERY | Age: 72
End: 2022-10-26

## 2022-10-26 NOTE — TELEPHONE ENCOUNTER
Patient is getting a second opinion.  I gave her my direct phone number to reach me if she should change her mind

## 2022-11-30 ENCOUNTER — TELEPHONE (OUTPATIENT)
Dept: ORTHOPEDIC SURGERY | Age: 72
End: 2022-11-30

## 2022-11-30 NOTE — TELEPHONE ENCOUNTER
Patient called and left  me a voice mail stating that she would like to schedule the block  Dr Migdalia Suh ordered on 10/4/2022. Patient did not have the injection at that time because she wanted to get a second opinion because the last SNRB that she had did not help and was not sure that she wanted to have another. Patient stated that she seen Dr Odell Hogue at the [facility] and that he seemed to think that her SI Joint was the issue. I told patient that she would need to have a recent office visit before going forward with the block. Patient verbalized her understanding.

## 2022-12-07 ENCOUNTER — OFFICE VISIT (OUTPATIENT)
Dept: ORTHOPEDIC SURGERY | Age: 72
End: 2022-12-07
Payer: MEDICARE

## 2022-12-07 VITALS
WEIGHT: 161 LBS | BODY MASS INDEX: 30.4 KG/M2 | HEART RATE: 76 BPM | OXYGEN SATURATION: 97 % | TEMPERATURE: 97 F | HEIGHT: 61 IN

## 2022-12-07 DIAGNOSIS — M53.3 SI (SACROILIAC) JOINT DYSFUNCTION: ICD-10-CM

## 2022-12-07 DIAGNOSIS — M46.1 SACROILIITIS (HCC): Primary | ICD-10-CM

## 2022-12-07 PROCEDURE — G8417 CALC BMI ABV UP PARAM F/U: HCPCS | Performed by: NURSE PRACTITIONER

## 2022-12-07 PROCEDURE — 1123F ACP DISCUSS/DSCN MKR DOCD: CPT | Performed by: NURSE PRACTITIONER

## 2022-12-07 PROCEDURE — 3017F COLORECTAL CA SCREEN DOC REV: CPT | Performed by: NURSE PRACTITIONER

## 2022-12-07 PROCEDURE — G8432 DEP SCR NOT DOC, RNG: HCPCS | Performed by: NURSE PRACTITIONER

## 2022-12-07 PROCEDURE — 99213 OFFICE O/P EST LOW 20 MIN: CPT | Performed by: NURSE PRACTITIONER

## 2022-12-07 PROCEDURE — G9899 SCRN MAM PERF RSLTS DOC: HCPCS | Performed by: NURSE PRACTITIONER

## 2022-12-07 PROCEDURE — G8427 DOCREV CUR MEDS BY ELIG CLIN: HCPCS | Performed by: NURSE PRACTITIONER

## 2022-12-07 PROCEDURE — 1090F PRES/ABSN URINE INCON ASSESS: CPT | Performed by: NURSE PRACTITIONER

## 2022-12-07 PROCEDURE — 1101F PT FALLS ASSESS-DOCD LE1/YR: CPT | Performed by: NURSE PRACTITIONER

## 2022-12-07 PROCEDURE — G8399 PT W/DXA RESULTS DOCUMENT: HCPCS | Performed by: NURSE PRACTITIONER

## 2022-12-07 PROCEDURE — G8536 NO DOC ELDER MAL SCRN: HCPCS | Performed by: NURSE PRACTITIONER

## 2022-12-07 PROCEDURE — G8756 NO BP MEASURE DOC: HCPCS | Performed by: NURSE PRACTITIONER

## 2022-12-07 NOTE — PROGRESS NOTES
Chief complaint   Chief Complaint   Patient presents with    Back Pain       History of Present Illness:  Mary Roger is a  71 y.o.  female who comes in today stating she is having left SI joint pain. She last saw Dr. Shan Sosa on October 6, 2022 and he was ordering a left L5 selective nerve root block. If that did not work he was going to order a left SI joint injection. She did not have that done because she wanted to get a second opinion and she went to the [facility] where she saw Dr. Negro Begum. She states Dr. Negro Begum feels like she needs the SI joint block done more than the selective. He has ordered a new MRI and she will be following up with him also. The meantime she like to go ahead with SI joint block done. She does have pain in the left SI joint area into the buttock. She does not have any radiating leg pain. She denies fever bowel bladder dysfunction. Physical Exam: The patient is a 50-year-old female well-developed well-nourished who is alert and oriented with a normal mood and affect. She has a full weightbearing nonantalgic gait. She has 4 out of 5 strength bilateral lower extremities. Negative straight leg raise. She is tender to palpation over the left SI joint. She is positive on the distraction test, thigh thrust test, compression test and Danyelle's test on the left. Assessment and Plan: This is a patient who has left SI joint dysfunction. I will set her up for a left SI joint block. We will see her back after the block. Medications:  Current Outpatient Medications   Medication Sig Dispense Refill    mirabegron ER (Myrbetriq) 50 mg ER tablet Take 1 Tablet by mouth daily. 90 Tablet 3    meloxicam (MOBIC) 7.5 mg tablet Take 1 Tablet by mouth daily. 90 Tablet 1    telmisartan (MICARDIS) 80 mg tablet Take 80 mg by mouth daily. cyanocobalamin 1,000 mcg tablet Take 1,000 mcg by mouth daily.       cholecalciferol (VITAMIN D3) (1000 Units /25 mcg) tablet Take 1,000 Units by mouth daily. Synthroid 150 mcg tablet Take 150 mcg by mouth daily. cetirizine (ZYRTEC) 10 mg tablet Take 10 mg by mouth daily. pitavastatin calcium (LIVALO) 2 mg tablet Take  by mouth daily. REFRESH TEARS 0.5 % drop ophthalmic solution       omega-3 fatty acids-vitamin e 1,000 mg cap Take 1 Cap by mouth two (2) times a day. albuterol (PROVENTIL HFA, VENTOLIN HFA, PROAIR HFA) 90 mcg/actuation inhaler inhale 2 puffs by mouth and INTO THE LUNGS every 6 hours if needed for cough (Patient not taking: No sig reported)      BIOTIN PO Take  by mouth. (Patient not taking: No sig reported)      multivitamin (ONE A DAY) tablet Take 1 Tab by mouth daily. (Patient not taking: No sig reported)      aspirin delayed-release 81 mg tablet Take  by mouth daily. (Patient not taking: No sig reported)      pantoprazole (PROTONIX) 40 mg tablet Take  by mouth. (Patient not taking: No sig reported)             Review of systems:    Past Medical History:   Diagnosis Date    Acquired ventricular hypertrophy     Allergic rhinitis     Bladder incontinence     Cardiac echocardiogram 05/11/2016    On-Sight Ultrasound:  EF 48%. No RWMA on segments seen. Mild conc LVH. Gr 1 DDfx. Normal RVSP. Mild TR. Cardiac nuclear imaging test, low risk 06/15/2016    Low risk. No ischemia or prior infarction. No RWMA. EF 71%. Neg EKG on maximal EST. Ex time 8 min 45 sec.     Cardiomyopathy [facility])     Cardiovascular disease     Esophageal reflux     Essential hypertension     Hyperlipidemia     Numerous moles     Sleep apnea     Thyroid disease     Vitamin D deficiency      Past Surgical History:   Procedure Laterality Date    HX CATARACT REMOVAL      HX LEEP PROCEDURE      SD APPENDECTOMY       Social History     Socioeconomic History    Marital status:      Spouse name: Not on file    Number of children: Not on file    Years of education: Not on file    Highest education level: Not on file Occupational History    Not on file   Tobacco Use    Smoking status: Light Smoker     Packs/day: 0.50     Years: 50.00     Pack years: 25.00     Types: Cigarettes    Smokeless tobacco: Never   Vaping Use    Vaping Use: Never used   Substance and Sexual Activity    Alcohol use: Not Currently    Drug use: No    Sexual activity: Yes   Other Topics Concern    Not on file   Social History Narrative    Not on file     Social Determinants of Health     Financial Resource Strain: Not on file   Food Insecurity: Not on file   Transportation Needs: Not on file   Physical Activity: Not on file   Stress: Not on file   Social Connections: Not on file   Intimate Partner Violence: Not on file   Housing Stability: Not on file     Family History   Problem Relation Age of Onset    Heart Attack Brother     High Cholesterol Father     OSTEOARTHRITIS Father     Breast Cancer Sister     Lung Cancer Mother     Heart Attack Sister     Heart Attack Brother        Physical Exam:  Visit Vitals  Pulse 76   Temp 97 °F (36.1 °C) (Temporal)   Ht 5' 1\" (1.549 m)   Wt 161 lb (73 kg)   SpO2 97%   BMI 30.42 kg/m²     Pain Scale: 5/10       has been . reviewed and is appropriate          Diagnoses and all orders for this visit:    1. Sacroiliitis (HCC)  -     SCHEDULE SURGERY    2. SI (sacroiliac) joint dysfunction  -     SCHEDULE SURGERY          Follow-up and Dispositions    Return for After the block with Dr. John Moran. We have informed Maria Elena Worrell to notify us for immediate appointment if she has any worsening neurogical symptoms or if an emergency situation presents, then call 911    Please note that this dictation was completed with [email or web address], the computer voice recognition software. Quite often unanticipated grammatical, syntax, homophones, and other interpretive errors are inadvertently transcribed by the computer software. Please disregard these errors. Please excuse any errors that have escaped final proofreading.

## 2022-12-15 ENCOUNTER — HOSPITAL ENCOUNTER (OUTPATIENT)
Age: 72
Setting detail: OUTPATIENT SURGERY
Discharge: HOME | End: 2022-12-15
Attending: PHYSICAL MEDICINE & REHABILITATION | Admitting: PHYSICAL MEDICINE & REHABILITATION
Payer: MEDICARE

## 2022-12-15 ENCOUNTER — APPOINTMENT (OUTPATIENT)
Dept: GENERAL RADIOLOGY | Age: 72
End: 2022-12-15
Attending: PHYSICAL MEDICINE & REHABILITATION
Payer: MEDICARE

## 2022-12-15 VITALS
HEART RATE: 76 BPM | TEMPERATURE: 97.9 F | DIASTOLIC BLOOD PRESSURE: 93 MMHG | OXYGEN SATURATION: 96 % | RESPIRATION RATE: 16 BRPM | SYSTOLIC BLOOD PRESSURE: 148 MMHG

## 2022-12-15 PROCEDURE — 74011250636 HC RX REV CODE- 250/636: Performed by: PHYSICAL MEDICINE & REHABILITATION

## 2022-12-15 PROCEDURE — 74011250637 HC RX REV CODE- 250/637: Performed by: PHYSICAL MEDICINE & REHABILITATION

## 2022-12-15 PROCEDURE — 27096 INJECT SACROILIAC JOINT: CPT | Performed by: PHYSICAL MEDICINE & REHABILITATION

## 2022-12-15 PROCEDURE — 76010000009 HC PAIN MGT 0 TO 30 MIN PROC: Performed by: PHYSICAL MEDICINE & REHABILITATION

## 2022-12-15 PROCEDURE — 74011000250 HC RX REV CODE- 250: Performed by: PHYSICAL MEDICINE & REHABILITATION

## 2022-12-15 PROCEDURE — 77030003676 HC NDL SPN MPRI -A: Performed by: PHYSICAL MEDICINE & REHABILITATION

## 2022-12-15 PROCEDURE — 2709999900 HC NON-CHARGEABLE SUPPLY: Performed by: PHYSICAL MEDICINE & REHABILITATION

## 2022-12-15 PROCEDURE — 77030039433 HC TY MYLEOGRAM BD -B: Performed by: PHYSICAL MEDICINE & REHABILITATION

## 2022-12-15 RX ORDER — DEXAMETHASONE SODIUM PHOSPHATE 100 MG/10ML
INJECTION INTRAMUSCULAR; INTRAVENOUS AS NEEDED
Status: DISCONTINUED | OUTPATIENT
Start: 2022-12-15 | End: 2022-12-15 | Stop reason: HOSPADM

## 2022-12-15 RX ORDER — DIAZEPAM 5 MG/1
5-20 TABLET ORAL ONCE
Status: COMPLETED | OUTPATIENT
Start: 2022-12-15 | End: 2022-12-15

## 2022-12-15 RX ORDER — LIDOCAINE HYDROCHLORIDE 10 MG/ML
INJECTION, SOLUTION EPIDURAL; INFILTRATION; INTRACAUDAL; PERINEURAL AS NEEDED
Status: DISCONTINUED | OUTPATIENT
Start: 2022-12-15 | End: 2022-12-15 | Stop reason: HOSPADM

## 2022-12-15 RX ADMIN — DIAZEPAM 5 MG: 5 TABLET ORAL at 12:25

## 2022-12-15 NOTE — PERIOP NOTES
Patient verbalized understanding of discharge instructions and verbally consented to [facility] Patient Consent. Signature pad not working.

## 2022-12-15 NOTE — PROCEDURES
Procedure Note    Patient Name: Alecia Panda    Date of Procedure: December 15, 2022    Preoperative Diagnosis: Sacrilitis    Post Operative Diagnosis: same    Procedure: SI Joint Injection left     Consent: Informed consent was obtained prior to the procedure. The patient was given the opportunity to ask questions regarding the procedure and its associated risks. In addition to the potential risks associated with the procedure itself, the patient was informed both verbally and in writing of potential side effects of the use of glucocorticoids. The patient appeared to comprehend the informed consent and desired to have the procedure perfored. Procedure: The patient was placed in the prone position on the flouroscopy table and the back was prepped and draped in the usual sterile manner. A #22 gauge spinal needle was then advanced to lie within the SI joint after local Lidocaine 1% injection. A total of 10 mg of preservative free Dexamethasone and 5 cc of Lidocaine was introduced into the SI joint. The injection area was cleaned and bandaids applied. No excessive bleeding was noted. Patient dressed and was discharged to home with instructions. Discussion: The patient tolerated the procedure well.      [address], [place]  December 15, 2022

## 2022-12-29 ENCOUNTER — TRANSCRIBE ORDER (OUTPATIENT)
Dept: REGISTRATION | Age: 72
End: 2022-12-29

## 2022-12-29 ENCOUNTER — HOSPITAL ENCOUNTER (OUTPATIENT)
Dept: GENERAL RADIOLOGY | Age: 72
Discharge: HOME | End: 2022-12-29
Payer: MEDICARE

## 2022-12-29 DIAGNOSIS — J06.9 URI (UPPER RESPIRATORY INFECTION): Primary | ICD-10-CM

## 2022-12-29 DIAGNOSIS — J06.9 URI (UPPER RESPIRATORY INFECTION): ICD-10-CM

## 2022-12-29 PROCEDURE — 71046 X-RAY EXAM CHEST 2 VIEWS: CPT

## 2023-01-17 ENCOUNTER — TRANSCRIBE ORDER (OUTPATIENT)
Dept: SCHEDULING | Age: 73
End: 2023-01-17

## 2023-01-17 DIAGNOSIS — Z87.891 PERSONAL HISTORY OF TOBACCO USE, PRESENTING HAZARDS TO HEALTH: Primary | ICD-10-CM

## 2023-01-28 ENCOUNTER — TRANSCRIBE ORDERS (OUTPATIENT)
Facility: HOSPITAL | Age: 73
End: 2023-01-28

## 2023-01-28 DIAGNOSIS — Z87.891 PERSONAL HISTORY OF TOBACCO USE, PRESENTING HAZARDS TO HEALTH: Primary | ICD-10-CM

## 2023-02-16 ENCOUNTER — HOSPITAL ENCOUNTER (OUTPATIENT)
Facility: HOSPITAL | Age: 73
Discharge: HOME | End: 2023-02-16
Payer: MEDICARE

## 2023-02-16 DIAGNOSIS — Z87.891 PERSONAL HISTORY OF TOBACCO USE, PRESENTING HAZARDS TO HEALTH: ICD-10-CM

## 2023-02-16 PROCEDURE — 71271 CT THORAX LUNG CANCER SCR C-: CPT

## 2023-03-09 RX ORDER — MELOXICAM 7.5 MG/1
TABLET ORAL
Qty: 90 TABLET | OUTPATIENT
Start: 2023-03-09

## 2023-08-03 ENCOUNTER — HOSPITAL ENCOUNTER (OUTPATIENT)
Facility: HOSPITAL | Age: 73
Discharge: HOME | End: 2023-08-03
Attending: FAMILY MEDICINE
Payer: MEDICARE

## 2023-08-03 ENCOUNTER — HOSPITAL ENCOUNTER (OUTPATIENT)
Facility: HOSPITAL | Age: 73
End: 2023-08-03
Attending: FAMILY MEDICINE
Payer: MEDICARE

## 2023-08-03 DIAGNOSIS — M85.80 OSTEOPENIA, UNSPECIFIED LOCATION: ICD-10-CM

## 2023-08-03 DIAGNOSIS — Z78.0 ASYMPTOMATIC POSTMENOPAUSAL STATE: ICD-10-CM

## 2023-08-03 DIAGNOSIS — Z12.31 VISIT FOR SCREENING MAMMOGRAM: ICD-10-CM

## 2023-08-03 DIAGNOSIS — Z13.820 SCREENING FOR OSTEOPOROSIS: ICD-10-CM

## 2023-08-03 PROCEDURE — 77063 BREAST TOMOSYNTHESIS BI: CPT

## 2023-08-03 PROCEDURE — 77080 DXA BONE DENSITY AXIAL: CPT

## 2023-08-18 NOTE — PROGRESS NOTES
Inserted under fluoro. PT DAILY TREATMENT NOTE     Patient Name: Randy Campbell  Date:2020  : 1950  [x]  Patient  Verified  [place]or: [place] MED[place] / Plan: [place] MED[place]ARE PART [place] & B / Product Type: Medicare /    In UEAD:4270  Out time:1202  Total Treatment Time (min): 50  Visit #: 1 of 8    Medicare/BCBS Only   Total Timed Codes (min):  50 1:1 Treatment Time:  50       Treatment Area: Low back pain [M54.5]    SUBJECTIVE  Pain Level (0-10 scale): 2  Any medication changes, allergies to medications, adverse drug reactions, diagnosis change, or new procedure performed?: [x] No    [] Yes (see summary sheet for update)  Subjective functional status/changes:   [] No changes reported  Pt reports she is still having a lot of pain from root canal and is still on antibiotics. She reports she did a ton of work around the home and is sore today. HEP is being completed. OBJECTIVE    19 min Therapeutic Exercise:  [x] See flow sheet :   Rationale: increase ROM and increase strength to improve the patients ability to complete functional activities. 6 min Therapeutic Activity:  [x]  See flow sheet :   Rationale: increase ROM, increase strength, improve coordination and increase proprioception  to improve the patients ability to complete household chores. 15 min Neuromuscular Re-education:  [x]  See flow sheet :   Rationale increase strength, improve coordination and increase proprioception  to improve the patients motor control and proprioceptive input to increase ability to activate correct muscles when performing functional task. 10 min Manual Therapy:  STM/DTM to L glutes, piriformis, hamstrings   The manual therapy interventions were performed at a separate and distinct time from the therapeutic activities interventions.   Rationale: decrease pain, increase ROM, increase tissue extensibility and decrease trigger points to improve abilities to complete self care           With   [x] TE   [x] TA   [x] neuro   [] other: Patient Education: [x] Review HEP    [] Progressed/Changed HEP based on:   [] positioning   [] body mechanics   [] transfers   [] heat/ice application    [] other:      Other Objective/Functional Measures:      Pain Level (0-10 scale) post treatment: <2    ASSESSMENT/Changes in Function: Progressed resistance on interventions to increase strength to improve abilities to complete functional activities. Continued to progress functional interventions to improve endurance and abilities to complete household chores with decreased pain. Verbal cues and demonstration required to use correct body mechanics. Encouraged pt to complete HEP daily to help to continue to progress PT interventions to improve patient's ability to complete functional and community task independently. At the conclusion of the session, pt reported decreased pain. Pt denied modalities. Patient will continue to benefit from skilled PT services to modify and progress therapeutic interventions, address functional mobility deficits, address ROM deficits, address strength deficits, analyze and address soft tissue restrictions, analyze and cue movement patterns, analyze and modify body mechanics/ergonomics, assess and modify postural abnormalities, address imbalance/dizziness and instruct in home and community integration to attain remaining goals. [x]  See Plan of Care  [x]  See progress note/recertification  []  See Discharge Summary         Progress towards goals / Updated goals:  Goals for this certification period to be accomplished in 4 weeks:  1. Patient will be able to lift 40# from the ground without increased pain or difficulty. PN: unable to lift more than 10lbs   2. Patient will be able to vacuum without difficulty.                 PN: moderate to quite a bit of difficulty    PLAN  []  Upgrade activities as tolerated     [x]  Continue plan of care  []  Update interventions per flow sheet       []  Discharge due to:_  [] Other:_      Mary Jo Worley 12/30/2020  11:16 AM    Future Appointments   Date Time Provider Rosette Freeman   8/2/2021  9:20 AM Carmelo Mendenhall MD [facility] BS AMB

## 2023-09-05 ENCOUNTER — OFFICE VISIT (OUTPATIENT)
Age: 73
End: 2023-09-05

## 2023-09-05 VITALS
DIASTOLIC BLOOD PRESSURE: 88 MMHG | WEIGHT: 157 LBS | OXYGEN SATURATION: 99 % | HEART RATE: 85 BPM | BODY MASS INDEX: 28.89 KG/M2 | HEIGHT: 62 IN | SYSTOLIC BLOOD PRESSURE: 130 MMHG

## 2023-09-05 DIAGNOSIS — I10 ESSENTIAL (PRIMARY) HYPERTENSION: Primary | ICD-10-CM

## 2023-09-05 DIAGNOSIS — E03.9 HYPOTHYROIDISM, UNSPECIFIED TYPE: ICD-10-CM

## 2023-09-05 DIAGNOSIS — E78.5 DYSLIPIDEMIA: ICD-10-CM

## 2023-09-05 PROBLEM — T14.90XA TRAUMA: Status: ACTIVE | Noted: 2019-12-18

## 2023-09-05 RX ORDER — MULTIVIT-MINERALS/FA/LYCOPENE 400-370MCG
1 TABLET ORAL DAILY
COMMUNITY

## 2023-09-05 ASSESSMENT — PATIENT HEALTH QUESTIONNAIRE - PHQ9
1. LITTLE INTEREST OR PLEASURE IN DOING THINGS: 0
2. FEELING DOWN, DEPRESSED OR HOPELESS: 0
SUM OF ALL RESPONSES TO PHQ QUESTIONS 1-9: 0
SUM OF ALL RESPONSES TO PHQ9 QUESTIONS 1 & 2: 0
SUM OF ALL RESPONSES TO PHQ QUESTIONS 1-9: 0

## 2023-09-05 ASSESSMENT — ANXIETY QUESTIONNAIRES
3. WORRYING TOO MUCH ABOUT DIFFERENT THINGS: 0
6. BECOMING EASILY ANNOYED OR IRRITABLE: 0
5. BEING SO RESTLESS THAT IT IS HARD TO SIT STILL: 0
GAD7 TOTAL SCORE: 0
4. TROUBLE RELAXING: 0
1. FEELING NERVOUS, ANXIOUS, OR ON EDGE: 0
7. FEELING AFRAID AS IF SOMETHING AWFUL MIGHT HAPPEN: 0
2. NOT BEING ABLE TO STOP OR CONTROL WORRYING: 0

## 2023-09-05 ASSESSMENT — ENCOUNTER SYMPTOMS
ABDOMINAL DISTENTION: 0
NAUSEA: 0
VOMITING: 0
BACK PAIN: 1
COUGH: 0
SORE THROAT: 0
SHORTNESS OF BREATH: 0
ABDOMINAL PAIN: 0

## 2023-09-05 NOTE — PROGRESS NOTES
Alexandra Wade presents today for   Chief Complaint   Patient presents with    Follow-up     1 year    Procedure     10/16/23: back surgery with Dr. Rico Bridges at [address] preferred language for health care discussion is english/other. Is someone accompanying this pt? no    Is the patient using any DME equipment during OV? no    Depression Screening:  Depression: Not at risk    PHQ-2 Score: 0        Learning Assessment:  Who is the primary learner? Patient    What is the preferred language for health care of the primary learner? ENGLISH    How does the primary learner prefer to learn new concepts? DEMONSTRATION    Answered By patient    Relationship to Learner SELF           Pt currently taking Anticoagulant therapy? no    Pt currently taking Antiplatelet therapy ? Aspirin 81 mg daily      Coordination of Care:  1. Have you been to the ER, urgent care clinic since your last visit? Hospitalized since your last visit? no    2. Have you seen or consulted any other health care providers outside of the [address] [place] [address] since your last visit? Include any pap smears or colon screening.  no
disease     Vitamin D deficiency      Current Outpatient Medications   Medication Sig Dispense Refill    Multiple Vitamins-Minerals (ONE-A-DAY WOMENS 50+) TABS Take 1 tablet by mouth daily      Omega-3 Fatty Acids (FP FISH OIL PO) Take 1 capsule by mouth daily      mirabegron (MYRBETRIQ) 50 MG TB24 Take 50 mg by mouth daily 90 tablet 3    BIOTIN PO Take 1 tablet by mouth daily      albuterol sulfate HFA (PROVENTIL;VENTOLIN;PROAIR) 108 (90 Base) MCG/ACT inhaler inhale 2 puffs by mouth and INTO THE LUNGS every 6 hours if needed for cough      aspirin 81 MG EC tablet Take by mouth daily      carboxymethylcellulose (REFRESH PLUS) 0.5 % SOLN ophthalmic solution Place 1 drop into both eyes daily ceived the following from [facility]: Outside name: REFRESH TEARS 0.5 % drop ophthalmic solution      cetirizine (ZYRTEC) 10 MG tablet Take 1 tablet by mouth daily      vitamin D (CHOLECALCIFEROL) 25 MCG (1000 UT) TABS tablet Take 1 tablet by mouth daily      cyanocobalamin 1000 MCG tablet Take 1 tablet by mouth daily      levothyroxine (SYNTHROID) 150 MCG tablet Take 1 tablet by mouth daily      meloxicam (MOBIC) 7.5 MG tablet Take 1 tablet by mouth as needed for Pain      pantoprazole (PROTONIX) 40 MG tablet Take 1 tablet by mouth as needed      pitavastatin (LIVALO) 2 MG TABS tablet Take by mouth daily      telmisartan (MICARDIS) 80 MG tablet Take 1 tablet by mouth daily       No current facility-administered medications for this visit. Allergies   Allergen Reactions    Sulfamethoxazole-Trimethoprim Other (See Comments)     Upset stomach, makes patient real sick     Social History     Tobacco Use    Smoking status: Never    Smokeless tobacco: Never   Vaping Use    Vaping Use: Never used   Substance Use Topics    Alcohol use:  Yes     Alcohol/week: 1.0 standard drink     Types: 1 Glasses of wine per week    Drug use: Never     Family History   Problem Relation Age of Onset    Ovarian Cancer Mother     High

## 2023-09-06 ENCOUNTER — TELEPHONE (OUTPATIENT)
Age: 73
End: 2023-09-06

## 2023-10-12 ENCOUNTER — HOSPITAL ENCOUNTER (OUTPATIENT)
Facility: HOSPITAL | Age: 73
Discharge: HOME | End: 2023-10-12
Attending: NEUROLOGICAL SURGERY
Payer: MEDICARE

## 2023-10-12 DIAGNOSIS — G89.29 CHRONIC RIGHT SHOULDER PAIN: ICD-10-CM

## 2023-10-12 DIAGNOSIS — M54.16 LUMBAR RADICULOPATHY: ICD-10-CM

## 2023-10-12 DIAGNOSIS — M25.511 CHRONIC RIGHT SHOULDER PAIN: ICD-10-CM

## 2023-10-12 DIAGNOSIS — M48.061 LUMBAR FORAMINAL STENOSIS: ICD-10-CM

## 2023-10-12 PROCEDURE — 72148 MRI LUMBAR SPINE W/O DYE: CPT

## 2023-12-05 ENCOUNTER — HOSPITAL ENCOUNTER (OUTPATIENT)
Facility: HOSPITAL | Age: 73
Setting detail: RECURRING SERIES
Discharge: HOME | End: 2023-12-08
Payer: MEDICARE

## 2023-12-05 PROCEDURE — 97110 THERAPEUTIC EXERCISES: CPT

## 2023-12-05 PROCEDURE — 97535 SELF CARE MNGMENT TRAINING: CPT

## 2023-12-05 PROCEDURE — 97162 PT EVAL MOD COMPLEX 30 MIN: CPT

## 2023-12-05 NOTE — PROGRESS NOTES
[address] [facility] [place], [place] [address] [facility] QZ:[phone] Fx: [phone]    PLAN OF CARE/ Statement of Necessity for Physical Therapy Services           Patient name: Pertos Mendoza Start of Care: 2023   Referral source: Sharath Rivera MD : 1950    Medical Diagnosis: Other low back pain [M54.59]       Onset Date: 10/16/2023   Treatment Diagnosis: M54.59  OTHER LOWER BACK PAIN                                     Prior Hospitalization: see medical history Provider#: [id number]   Medications: Verified on Patient Summary List     Comorbidities: OA; HTN; BMI > 30  Prior Level of Function: chronic back pain; caregiver for 2 sons with MD; able to clean house (I)    The Plan of Care and following information is based on the information from the initial evaluation. Assessment / key information:  68y.o. year old female presents with CC of back pain s/p surgical decompression. Patient was advised to avoid bending,lifting, standing/sitting in prolonged positions. Impairments noted today: decreased B hip, quad, and hamstring flexibility; decreased TA strength/stability; decreased B hip abd/ext strength. Patient will benefit from physical therapy to address deficits, and ultimately to return patient to prior level of function. Evaluation Complexity:  History:  MEDIUM  Complexity : 1-2 comorbidities / personal factors will impact the outcome/ POC ; Examination:  MEDIUM Complexity : 3 Standardized tests and measures addressin body structure, function, activity limitation and / or participation in recreation  ;Presentation:  MEDIUM Complexity : Evolving with changing characteristics  ; Clinical Decision Making:  MEDIUM Complexity : FOTO score of 26-74 FOTO score = an established functional score where 100 = no disability  Overall Complexity Rating: MEDIUM  Problem List: pain affecting function, decrease strength, decrease ADL/functional
benefit from skilled PT / OT services to modify and progress therapeutic interventions, analyze and address functional mobility deficits, analyze and address ROM deficits, analyze and address strength deficits, analyze and address soft tissue restrictions, and analyze and cue for proper movement patterns to address functional deficits and attain remaining goals. Progress toward goals / Updated goals:  []  See Progress Note/Recertification    Short Term Goals: To be accomplished in 2 weeks  1. I and compliant with HEP for self management of symptoms. IE:issued HEP  2. Perform supine core exercises with good TA contraction to increase ease with ADLs. IE:poor contraction  Long Term Goals: To be accomplished in 12 weeks  1. Improve FOTO to 48 to indicate improved function with daily activities. IE:34  2. Demonstrate (-) 90/90 hamstring B to increase ease with cleaning house. IE:+ B   3. Increase B hip abd strength to 5/5 increase ease with prolonged standing once restrictions are lifted. IE:B 4-/5  4. Increase B hip ext strength to 4/5 to enable patient to resume vacuuming the house.   iE:B 3+/5  PLAN  Yes  Continue plan of care  []  Upgrade activities as tolerated  []  Discharge due to :  []  Other:    Robert Fall, PT, CMTPT    12/5/2023    11:35 AM    Future Appointments   Date Time Provider [address]   12/7/2023  2:30 PM [place] Yaniv Burner   12/12/2023  5:10 PM [facility] Yaniv Burner   12/15/2023 10:30 AM Jaimie Moody PT Yaniv Burner   12/18/2023  2:30 PM [facility] Yaniv Burner   12/20/2023  9:50 AM Robert Fall, PT [facility]   12/28/2023  9:10 AM Robert Fall, PT [facility]   1/3/2024  9:50 AM Robert Fall, PT [facility]   1/5/2024  9:50 AM Robert Fall, PT [facility]   1/8/2024  9:50 AM Robert Fall, PT [facility]   9/9/2024 10:00 AM Katie Benavides MD [facility]

## 2023-12-07 ENCOUNTER — HOSPITAL ENCOUNTER (OUTPATIENT)
Facility: HOSPITAL | Age: 73
Setting detail: RECURRING SERIES
Discharge: HOME | End: 2023-12-10
Payer: MEDICARE

## 2023-12-07 PROCEDURE — 97110 THERAPEUTIC EXERCISES: CPT

## 2023-12-07 PROCEDURE — 97112 NEUROMUSCULAR REEDUCATION: CPT

## 2023-12-07 PROCEDURE — 97140 MANUAL THERAPY 1/> REGIONS: CPT

## 2023-12-07 NOTE — PROGRESS NOTES
PHYSICAL / OCCUPATIONAL THERAPY - DAILY TREATMENT NOTE (updated )    Patient Name: Maegan Gamble    Date: 2023    : 1950  Insurance: Payor: MEDICARE / Plan: MEDICARE PART A AND B / Product Type: *No Product type* /      Patient  verified Yes     Visit #   Current / Total 2 24   Time   In / Out 230 313   Pain   In / Out 3 2   Subjective Functional Status/Changes: Patient reports that she has attempted her HEP and she is a little more sore today. \"I always have a burning sensation over my wound but it's healed\". She states that she is very weak due to abiding by her surgical restrictions; \"My muscles are very weak\". Changes to: Allergies, Med Hx, Sx Hx?   no       TREATMENT AREA =  Other low back pain [M54.59]    OBJECTIVE    Therapeutic Procedures: Tx Min Billable or 1:1 Min (if diff from Tx Min) Procedure, Rationale, Specifics   15  33688 Therapeutic Exercise (timed):  increase ROM, strength, coordination, balance, and proprioception to improve patient's ability to progress to PLOF and address remaining functional goals. (see flow sheet as applicable)    Details if applicable:       8  86365 Manual Therapy (timed):  decrease pain, increase ROM, increase tissue extensibility, and decrease trigger points to improve patient's ability to progress to PLOF and address remaining functional goals. The manual therapy interventions were performed at a separate and distinct time from the therapeutic activities interventions . Details: prone: STM/TPR to B glutes/B paraspinals    Details if applicable:     20  04497 Neuromuscular Re-Education (timed):  improve balance, coordination, kinesthetic sense, posture, core stability and proprioception to improve patient's ability to develop conscious control of individual muscles and awareness of position of extremities in order to progress to PLOF and address remaining functional goals.  (see flow sheet as applicable)     Details if applicable:  hip, glute,

## 2023-12-12 ENCOUNTER — HOSPITAL ENCOUNTER (OUTPATIENT)
Facility: HOSPITAL | Age: 73
Setting detail: RECURRING SERIES
Discharge: HOME | End: 2023-12-15
Payer: MEDICARE

## 2023-12-12 PROCEDURE — 97112 NEUROMUSCULAR REEDUCATION: CPT

## 2023-12-12 PROCEDURE — 97110 THERAPEUTIC EXERCISES: CPT

## 2023-12-12 PROCEDURE — 97140 MANUAL THERAPY 1/> REGIONS: CPT

## 2023-12-12 NOTE — PROGRESS NOTES
3+/5    PLAN  Yes  Continue plan of care  []  Upgrade activities as tolerated  []  Discharge due to :  []  Other:    Charles Vernon, PTA    12/12/2023    8:32 AM    Future Appointments   Date Time Provider [address]   12/15/2023 10:30 AM Myesha Dougherty, PT [facility]   12/18/2023  2:30 PM Charles Vernon, [facility]   12/20/2023  9:50 AM Myesha Dougherty, PT [facility]   12/28/2023  9:10 AM Myesha Dougherty, PT [facility]   1/3/2024  9:50 AM Myesha Dougherty, PT [facility]   1/5/2024  9:50 AM Myesha Dougherty, PT [facility]   1/8/2024  9:50 AM Myesha Dougherty, PT [facility]   9/9/2024 10:00 AM Angi Gipson MD [facility] BS AMB

## 2023-12-20 ENCOUNTER — APPOINTMENT (OUTPATIENT)
Facility: HOSPITAL | Age: 73
End: 2023-12-20
Payer: MEDICARE

## 2023-12-22 ENCOUNTER — HOSPITAL ENCOUNTER (OUTPATIENT)
Facility: HOSPITAL | Age: 73
Setting detail: RECURRING SERIES
Discharge: HOME | End: 2023-12-25
Payer: MEDICARE

## 2023-12-22 PROCEDURE — 97110 THERAPEUTIC EXERCISES: CPT

## 2023-12-22 PROCEDURE — 97530 THERAPEUTIC ACTIVITIES: CPT

## 2023-12-22 PROCEDURE — 97140 MANUAL THERAPY 1/> REGIONS: CPT

## 2023-12-22 NOTE — PROGRESS NOTES
[facility]   1/3/2024  9:50 AM [facility]   1/5/2024  9:50 AM [facility]   1/8/2024  9:50 AM [facility]   9/9/2024 10:00 AM Hannah Buckley MD [facility] BS AMB

## 2023-12-28 ENCOUNTER — HOSPITAL ENCOUNTER (OUTPATIENT)
Facility: HOSPITAL | Age: 73
Setting detail: RECURRING SERIES
Discharge: HOME | End: 2023-12-31
Payer: MEDICARE

## 2023-12-28 PROCEDURE — 97140 MANUAL THERAPY 1/> REGIONS: CPT

## 2023-12-28 PROCEDURE — 97110 THERAPEUTIC EXERCISES: CPT

## 2023-12-28 PROCEDURE — 97112 NEUROMUSCULAR REEDUCATION: CPT

## 2023-12-28 NOTE — PROGRESS NOTES
PHYSICAL / OCCUPATIONAL THERAPY - DAILY TREATMENT NOTE (updated )    Patient Name: Keiko Zheng    Date: 2023    : 1950  Insurance: Payor: MEDICARE / Plan: MEDICARE PART A AND B / Product Type: *No Product type* /      Patient  verified Yes     Visit #   Current / Total 7 24   Time   In / Out 911 1002   Pain   In / Out 3 0   Subjective Functional Status/Changes: It still hurts on the right side.    Changes to:  Meds, Allergies, Med Hx, Sx Hx?  If yes, update Summary List no       TREATMENT AREA =  Other low back pain [M54.59]    OBJECTIVE    Modalities Rationale:     decrease pain to improve patient's ability to progress to PLOF and address remaining functional goals.     min [] Estim Unattended, type/location:                                      []  w/ice    []  w/heat    min [] Estim Attended, type/location:                                     []  w/US     []  w/ice    []  w/heat    []  TENS insruct      min []  Mechanical Traction: type/lbs                   []  pro   []  sup   []  int   []  cont    []  before manual    []  after manual    min []  Ultrasound, settings/location:      min []  Iontophoresis w/ dexamethasone, location:                                               []  take home patch       []  in clinic   10     min  unbilled [x]  Ice     []  Heat    location/position: Prone; L/S    min []  Paraffin,  details:     min []  Vasopneumatic Device, press/temp:     min []  Whirlpool / Fluido:    If using vaso (only need to measure limb vaso being performed on)      pre-treatment girth :       post-treatment girth :       measured at (landmark location) :      min []  Other:    Skin assessment post-treatment (if applicable):    []  intact    []  redness- no adverse reaction                 []redness - adverse reaction:         Therapeutic Procedures:  Tx Min Billable or 1:1 Min (if diff from Tx Min) Procedure, Rationale, Specifics   23  72101 Therapeutic Exercise (timed):  increase

## 2024-01-03 ENCOUNTER — HOSPITAL ENCOUNTER (OUTPATIENT)
Facility: HOSPITAL | Age: 74
Setting detail: RECURRING SERIES
Discharge: HOME | End: 2024-01-06
Payer: MEDICARE

## 2024-01-03 PROCEDURE — 97112 NEUROMUSCULAR REEDUCATION: CPT

## 2024-01-03 PROCEDURE — 97110 THERAPEUTIC EXERCISES: CPT

## 2024-01-03 PROCEDURE — 97140 MANUAL THERAPY 1/> REGIONS: CPT

## 2024-01-03 NOTE — PROGRESS NOTES
PHYSICAL / OCCUPATIONAL THERAPY - DAILY TREATMENT NOTE (updated )    Patient Name: Keiko Zheng    Date: 1/3/2024    : 1950  Insurance: Payor: MEDICARE / Plan: MEDICARE PART A AND B / Product Type: *No Product type* /      Patient  verified Yes     Visit #   Current / Total 8 24   Time   In / Out 952 1048   Pain   In / Out 2 2   Subjective Functional Status/Changes: My incision was burning so bad yesterday when I put all of my [place] stuff up.   Changes to:  Meds, Allergies, Med Hx, Sx Hx?  If yes, update Summary List no       TREATMENT AREA =  Other low back pain [M54.59]    OBJECTIVE    Modalities Rationale:     decrease pain to improve patient's ability to progress to PLOF and address remaining functional goals.    10     min  unbilled [x]  Ice     []  Heat    location/position: Prone; L/S   Skin assessment post-treatment (if applicable):    []  intact    []  redness- no adverse reaction                 []redness - adverse reaction:         Therapeutic Procedures:  Tx Min Billable or 1:1 Min (if diff from Tx Min) Procedure, Rationale, Specifics   28  66850 Therapeutic Exercise (timed):  increase ROM, strength, coordination, balance, and proprioception to improve patient's ability to progress to PLOF and address remaining functional goals. (see flow sheet as applicable)     Details if applicable:       10  25163 Neuromuscular Re-Education (timed):  improve balance, coordination, kinesthetic sense, posture, core stability and proprioception to improve patient's ability to develop conscious control of individual muscles and awareness of position of extremities in order to progress to PLOF and address remaining functional goals. (see flow sheet as applicable)     Details if applicable:     8  50330 Manual Therapy (timed):  decrease pain, increase tissue extensibility, and decrease trigger points to improve patient's ability to progress to PLOF and address remaining functional goals.  The manual

## 2024-01-05 ENCOUNTER — HOSPITAL ENCOUNTER (OUTPATIENT)
Facility: HOSPITAL | Age: 74
Setting detail: RECURRING SERIES
Discharge: HOME | End: 2024-01-08
Payer: MEDICARE

## 2024-01-05 PROCEDURE — 97112 NEUROMUSCULAR REEDUCATION: CPT

## 2024-01-05 PROCEDURE — 97140 MANUAL THERAPY 1/> REGIONS: CPT

## 2024-01-05 PROCEDURE — 97110 THERAPEUTIC EXERCISES: CPT

## 2024-01-05 NOTE — PROGRESS NOTES
[facility]  [address] [facility] [address] [place] [address] - Ph: [phone]   Fx: [phone]    PHYSICAL THERAPY PROGRESS NOTE      Patient name: Keiko Zheng Start of Care: 23   Referral source: Leo Geiger MD : 1950    Medical Diagnosis: Other low back pain [M54.59]  Payor: MEDICARE / Plan: MEDICARE PART A AND B / Product Type: *No Product type* /  Onset Date:10/16/23    Treatment Diagnosis:  M54.59  OTHER LOWER BACK PAIN                                      Prior Hospitalization: see medical history Provider#: [id number]   Medications: Verified on Patient summary List   Comorbidities: OA; HTN; BMI > 30  Prior Level of Function: chronic back pain; caregiver for 2 sons with MD; able to clean house (I)  Visits from Start of Care: 9    Missed Visits: 0    Goals/Measure of Progress: To be achieved in 4 weeks:    1. I and compliant with HEP for self management of symptoms.   IE:issued HEP  PN: patient reports initial compliance goal met      2. Perform supine core exercises with good TA contraction to increase ease with ADLs.  IE: poor contraction  PN: Poor but is receptive to cueing.      3. Improve FOTO to 48 to indicate improved function with daily activities.   IE:34  PN: 54 goal met   4. Demonstrate (-) 90/90 hamstring B to increase ease with cleaning house.  IE:+ B  PN: (B) (+)   5. Increase B hip abd strength to 5/5 increase ease with prolonged standing once restrictions are lifted.   IE:B 4-/5  PN:4+/5 1/3/24  6.Increase B hip ext strength to 4/5 to enable patient to resume vacuuming the house.  IE:B 3+/5  PN:4-/5 23    Summary of Care/ Key Functional Changes: Subjective reports of less pain in right glutes. Continues fatigue quickly with standing and s/l exercises. B glute strength has improved by 1/2 grade; FOTO has improved by 20 points. Patient will benefit from continuing with PT to further progress with mobility and

## 2024-01-05 NOTE — PROGRESS NOTES
PHYSICAL / OCCUPATIONAL THERAPY - DAILY TREATMENT NOTE (updated )    Patient Name: Keiko Zheng    Date: 2024    : 1950  Insurance: Payor: MEDICARE / Plan: MEDICARE PART A AND B / Product Type: *No Product type* /      Patient  verified Yes     Visit #   Current / Total 9 24   Time   In / Out 949 1042   Pain   In / Out 3 0   Subjective Functional Status/Changes: I'm doing ok. My right shoulder has been really bothering me. I made an appointment with the doctor so he could give me a shot.   Changes to:  Meds, Allergies, Med Hx, Sx Hx?  If yes, update Summary List no       TREATMENT AREA =  Other low back pain [M54.59]    OBJECTIVE    Modalities Rationale:     decrease pain to improve patient's ability to progress to PLOF and address remaining functional goals.     min [] Estim Unattended, type/location:                                      []  w/ice    []  w/heat    min [] Estim Attended, type/location:                                     []  w/US     []  w/ice    []  w/heat    []  TENS insruct      min []  Mechanical Traction: type/lbs                   []  pro   []  sup   []  int   []  cont    []  before manual    []  after manual    min []  Ultrasound, settings/location:      min []  Iontophoresis w/ dexamethasone, location:                                               []  take home patch       []  in clinic     10   min  unbilled [x]  Ice     []  Heat    location/position: L/S and right shoulder in prone    min []  Paraffin,  details:     min []  Vasopneumatic Device, press/temp:     min []  Whirlpool / Fluido:    If using vaso (only need to measure limb vaso being performed on)      pre-treatment girth :       post-treatment girth :       measured at (landmark location) :      min []  Other:    Skin assessment post-treatment (if applicable):    []  intact    []  redness- no adverse reaction                 []redness - adverse reaction:         Therapeutic Procedures:  Tx Min Billable or

## 2024-01-08 ENCOUNTER — APPOINTMENT (OUTPATIENT)
Facility: HOSPITAL | Age: 74
End: 2024-01-08
Payer: MEDICARE

## 2024-01-09 ENCOUNTER — HOSPITAL ENCOUNTER (OUTPATIENT)
Facility: HOSPITAL | Age: 74
Setting detail: RECURRING SERIES
Discharge: HOME | End: 2024-01-12
Payer: MEDICARE

## 2024-01-09 PROCEDURE — 97112 NEUROMUSCULAR REEDUCATION: CPT

## 2024-01-09 PROCEDURE — 97140 MANUAL THERAPY 1/> REGIONS: CPT

## 2024-01-09 PROCEDURE — 97110 THERAPEUTIC EXERCISES: CPT

## 2024-01-09 NOTE — PROGRESS NOTES
PHYSICAL / OCCUPATIONAL THERAPY - DAILY TREATMENT NOTE (updated )    Patient Name: Keiko Zheng    Date: 2024    : 1950  Insurance: Payor: MEDICARE / Plan: MEDICARE PART A AND B / Product Type: *No Product type* /      Patient  verified Yes     Visit #   Current / Total 10 24   Time   In / Out 0950 1030   Pain   In / Out 2-3 2   Subjective Functional Status/Changes: No changes noted.    Changes to:  Meds, Allergies, Med Hx, Sx Hx?  If yes, update Summary List no       TREATMENT AREA =  Other low back pain [M54.59]    OBJECTIVE    Therapeutic Procedures:  Tx Min Billable or 1:1 Min (if diff from Tx Min) Procedure, Rationale, Specifics   20  51088 Therapeutic Exercise (timed):  increase ROM, strength, coordination, balance, and proprioception to improve patient's ability to progress to PLOF and address remaining functional goals. (see flow sheet as applicable)     Details if applicable:       12  24878 Neuromuscular Re-Education (timed):  improve balance, coordination, kinesthetic sense, posture, core stability and proprioception to improve patient's ability to develop conscious control of individual muscles and awareness of position of extremities in order to progress to PLOF and address remaining functional goals. (see flow sheet as applicable)     Details if applicable:     8  08694 Manual Therapy (timed):  decrease pain, increase tissue extensibility, and decrease trigger points to improve patient's ability to progress to PLOF and address remaining functional goals.  The manual therapy interventions were performed at a separate and distinct time from the therapeutic activities interventions . (see flow sheet as applicable)     Details if applicable:  IASTM with meat hook instrument to the right glutes and piriformis          Details if applicable:            Details if applicable:     40  MC BC Totals Reminder: bill using total billable min of TIMED therapeutic procedures (example: do not

## 2024-01-12 ENCOUNTER — HOSPITAL ENCOUNTER (OUTPATIENT)
Facility: HOSPITAL | Age: 74
Setting detail: RECURRING SERIES
Discharge: HOME | End: 2024-01-15
Payer: MEDICARE

## 2024-01-12 PROCEDURE — 97112 NEUROMUSCULAR REEDUCATION: CPT

## 2024-01-12 PROCEDURE — 97110 THERAPEUTIC EXERCISES: CPT

## 2024-01-12 PROCEDURE — 97140 MANUAL THERAPY 1/> REGIONS: CPT

## 2024-01-12 NOTE — PROGRESS NOTES
billable min of TIMED therapeutic procedures (example: do not include dry needle or estim unattended, both untimed codes, in totals to left)  8-22 min = 1 unit; 23-37 min = 2 units; 38-52 min = 3 units; 53-67 min = 4 units; 68-82 min = 5 units   Total Total     TOTAL TREATMENT TIME:   53     [x]  Patient Education billed concurrently with other procedures   [x] Review HEP    [] Progressed/Changed HEP, detail:    [] Other detail:       Objective Information/Functional Measures/Assessment  Progressed reps as noted per flow sheet. Increased resistance slightly with Barrel hip extension. The pt is showing slow but gradual progress with strengthening. Pain level is diminished overall. + Cuing at times for appropriate therex technique.         Patient will continue to benefit from skilled PT / OT services to modify and progress therapeutic interventions, analyze and address functional mobility deficits, analyze and address ROM deficits, analyze and address strength deficits, analyze and address soft tissue restrictions, analyze and cue for proper movement patterns, and analyze and modify for postural abnormalities to address functional deficits and attain remaining goals.    Progress toward goals / Updated goals:  [x]  See Progress Note/Recertification    Goals/Measure of Progress: To be achieved in 4 weeks:     1. I and compliant with HEP for self management of symptoms.   IE:issued HEP  PN: patient reports initial compliance goal met      2. Perform supine core exercises with good TA contraction to increase ease with ADLs.  IE: poor contraction  PN: Poor but is receptive to cueing.    Current: remains limited 1/9/24  3. Improve FOTO to 48 to indicate improved function with daily activities.   IE:34  PN: 54 goal met   4. Demonstrate (-) 90/90 hamstring B to increase ease with cleaning house.  IE:+ B  PN: (B) (+)   Current: remains + at this time 1/12/24  5. Increase B hip abd strength to 5/5 increase ease with prolonged

## 2024-01-16 ENCOUNTER — HOSPITAL ENCOUNTER (OUTPATIENT)
Facility: HOSPITAL | Age: 74
Setting detail: RECURRING SERIES
Discharge: HOME | End: 2024-01-19
Payer: MEDICARE

## 2024-01-16 PROCEDURE — 97110 THERAPEUTIC EXERCISES: CPT

## 2024-01-16 PROCEDURE — 97112 NEUROMUSCULAR REEDUCATION: CPT

## 2024-01-16 NOTE — PROGRESS NOTES
hook-lying core stabs. MMT hip abd Left 4+/5, Right 4/5. PD manual and modalities. Reported Right low back/hip pain level of 2/10 post-treatment.    Patient will continue to benefit from skilled PT / OT services to modify and progress therapeutic interventions, analyze and address functional mobility deficits, analyze and address ROM deficits, analyze and address strength deficits, analyze and address soft tissue restrictions, and analyze and cue for proper movement patterns to address functional deficits and attain remaining goals.    Progress toward goals / Updated goals:  []  See Progress Note/Recertification    Goals/Measure of Progress: To be achieved in 4 weeks:     1. I and compliant with HEP for self management of symptoms.   IE:issued HEP  PN: patient reports initial compliance goal met      2. Perform supine core exercises with good TA contraction to increase ease with ADLs.  IE: poor contraction  PN: Poor but is receptive to cueing.   Current: remains limited 1/9/24  3. Improve FOTO to 48 to indicate improved function with daily activities.   IE:34  PN: 54 goal met   4. Demonstrate (-) 90/90 hamstring B to increase ease with cleaning house.  IE:+ B  PN: (B) (+)   Current: remains + at this time 1/12/24  5. Increase B hip abd strength to 5/5 increase ease with prolonged standing once restrictions are lifted.   IE:B 4-/5  PN:4+/5 1/3/24  Current: MMT hip abd Left 4+/5, Right 4/5. 1/16/2024  6.Increase B hip ext strength to 4/5 to enable patient to resume vacuuming the house.  IE:B 3+/5  PN:4-/5   Current: 4-/5 but progressing with glute strengthening 1/12/24    PLAN  Yes  Continue plan of care  []  Upgrade activities as tolerated  []  Discharge due to :  []  Other:    Talon Roberts PTA    1/16/2024    10:34 AM    Future Appointments   Date Time Provider Department Center   1/19/2024  9:50 AM Erum Velasco, PT [facility]   1/22/2024  9:50 AM Talon Roberts, [facility]  negative...

## 2024-01-17 ENCOUNTER — APPOINTMENT (OUTPATIENT)
Facility: HOSPITAL | Age: 74
End: 2024-01-17
Payer: MEDICARE

## 2024-01-19 ENCOUNTER — HOSPITAL ENCOUNTER (OUTPATIENT)
Facility: HOSPITAL | Age: 74
Setting detail: RECURRING SERIES
Discharge: HOME | End: 2024-01-22
Payer: MEDICARE

## 2024-01-19 PROCEDURE — 97112 NEUROMUSCULAR REEDUCATION: CPT

## 2024-01-19 PROCEDURE — 97110 THERAPEUTIC EXERCISES: CPT

## 2024-01-19 PROCEDURE — 97140 MANUAL THERAPY 1/> REGIONS: CPT

## 2024-01-19 NOTE — PROGRESS NOTES
PHYSICAL / OCCUPATIONAL THERAPY - DAILY TREATMENT NOTE (updated )    Patient Name: Keiko Zheng    Date: 2024    : 1950  Insurance: Payor: MEDICARE / Plan: MEDICARE PART A AND B / Product Type: *No Product type* /      Patient  verified Yes     Visit #   Current / Total 13 24   Time   In / Out 952 1043   Pain   In / Out 0 0   Subjective Functional Status/Changes: I'm feeling better.    Changes to:  Meds, Allergies, Med Hx, Sx Hx?  If yes, update Summary List no       TREATMENT AREA =  Other low back pain [M54.59]    OBJECTIVE    Modalities Rationale:     decrease pain to improve patient's ability to progress to PLOF and address remaining functional goals.      10   min  unbilled [x]  Ice     []  Heat    location/position: Prone; L/S    min []  Paraffin,  details:     min []  Vasopneumatic Device, press/temp:     min []  Whirlpool / Fluido:    If using vaso (only need to measure limb vaso being performed on)      pre-treatment girth :       post-treatment girth :       measured at (landmark location) :      min []  Other:    Skin assessment post-treatment (if applicable):    []  intact    []  redness- no adverse reaction                 []redness - adverse reaction:         Therapeutic Procedures:  Tx Min Billable or 1:1 Min (if diff from Tx Min) Procedure, Rationale, Specifics     36432 Therapeutic Exercise (timed):  increase ROM, strength, coordination, balance, and proprioception to improve patient's ability to progress to PLOF and address remaining functional goals. (see flow sheet as applicable)     Details if applicable:       112 Neuromuscular Re-Education (timed):  improve balance, coordination, kinesthetic sense, posture, core stability and proprioception to improve patient's ability to develop conscious control of individual muscles and awareness of position of extremities in order to progress to PLOF and address remaining functional goals. (see flow sheet as applicable)

## 2024-01-22 ENCOUNTER — HOSPITAL ENCOUNTER (OUTPATIENT)
Facility: HOSPITAL | Age: 74
Setting detail: RECURRING SERIES
Discharge: HOME | End: 2024-01-25
Payer: MEDICARE

## 2024-01-22 PROCEDURE — 97112 NEUROMUSCULAR REEDUCATION: CPT

## 2024-01-22 PROCEDURE — 97110 THERAPEUTIC EXERCISES: CPT

## 2024-01-24 ENCOUNTER — HOSPITAL ENCOUNTER (OUTPATIENT)
Facility: HOSPITAL | Age: 74
Setting detail: RECURRING SERIES
Discharge: HOME | End: 2024-01-27
Payer: MEDICARE

## 2024-01-24 PROCEDURE — 97110 THERAPEUTIC EXERCISES: CPT

## 2024-01-24 PROCEDURE — 97140 MANUAL THERAPY 1/> REGIONS: CPT

## 2024-01-24 PROCEDURE — 97112 NEUROMUSCULAR REEDUCATION: CPT

## 2024-01-24 NOTE — PROGRESS NOTES
PHYSICAL / OCCUPATIONAL THERAPY - DAILY TREATMENT NOTE (updated )    Patient Name: Keiko Zheng    Date: 2024    : 1950  Insurance: Payor: MEDICARE / Plan: MEDICARE PART A AND B / Product Type: *No Product type* /      Patient  verified Yes     Visit #   Current / Total 15 24   Time   In / Out 949 1042   Pain   In / Out 2 0   Subjective Functional Status/Changes: My incision is still camille burning.    Changes to:  Meds, Allergies, Med Hx, Sx Hx?  If yes, update Summary List no       TREATMENT AREA =  Other low back pain [M54.59]    OBJECTIVE    Modalities Rationale:     decrease pain to improve patient's ability to progress to PLOF and address remaining functional goals.     min [] Estim Unattended, type/location:                                      []  w/ice    []  w/heat    min [] Estim Attended, type/location:                                     []  w/US     []  w/ice    []  w/heat    []  TENS insruct      min []  Mechanical Traction: type/lbs                   []  pro   []  sup   []  int   []  cont    []  before manual    []  after manual    min []  Ultrasound, settings/location:      min []  Iontophoresis w/ dexamethasone, location:                                               []  take home patch       []  in clinic     10   min  unbilled [x]  Ice     []  Heat    location/position: Prone; L/S    min []  Paraffin,  details:     min []  Vasopneumatic Device, press/temp:     min []  Whirlpool / Fluido:    If using vaso (only need to measure limb vaso being performed on)      pre-treatment girth :       post-treatment girth :       measured at (landmark location) :      min []  Other:    Skin assessment post-treatment (if applicable):    []  intact    []  redness- no adverse reaction                 []redness - adverse reaction:         Therapeutic Procedures:  Tx Min Billable or 1:1 Min (if diff from Tx Min) Procedure, Rationale, Specifics   23  81885 Therapeutic Exercise (timed):

## 2024-01-29 ENCOUNTER — APPOINTMENT (OUTPATIENT)
Facility: HOSPITAL | Age: 74
End: 2024-01-29
Payer: MEDICARE

## 2024-01-29 ENCOUNTER — HOSPITAL ENCOUNTER (OUTPATIENT)
Facility: HOSPITAL | Age: 74
Discharge: HOME | End: 2024-02-01
Attending: OTOLARYNGOLOGY
Payer: MEDICARE

## 2024-01-29 DIAGNOSIS — J31.0 CHRONIC RHINITIS: ICD-10-CM

## 2024-01-29 DIAGNOSIS — J32.0 CHRONIC MAXILLARY SINUSITIS: ICD-10-CM

## 2024-01-29 PROCEDURE — 70486 CT MAXILLOFACIAL W/O DYE: CPT

## 2024-01-31 ENCOUNTER — HOSPITAL ENCOUNTER (OUTPATIENT)
Facility: HOSPITAL | Age: 74
Setting detail: RECURRING SERIES
Discharge: HOME | End: 2024-02-03
Payer: MEDICARE

## 2024-01-31 PROCEDURE — 97110 THERAPEUTIC EXERCISES: CPT

## 2024-01-31 PROCEDURE — 97112 NEUROMUSCULAR REEDUCATION: CPT

## 2024-01-31 PROCEDURE — 97535 SELF CARE MNGMENT TRAINING: CPT

## 2024-01-31 NOTE — PROGRESS NOTES
PT DISCHARGE DAILY NOTE AND SUMMARY     Date:2024  Patient name: Keiko Zheng Start of Care: 23   Referral source: Leo Geiger MD : 1950   Medical/Treatment Diagnosis: Other low back pain [M54.59] Onset Date:10     Prior Hospitalization: see medical history Provider#: [id number]   Medications: Verified on Patient Summary List    Comorbidities: OA; HTN; BMI > 30  Prior Level of Function: chronic back pain; caregiver for 2 sons with MD; able to clean house (I)  Insurance: Payor: MEDICARE / Plan: MEDICARE PART A AND B / Product Type: *No Product type* /        Visits from Start of Care: 16 Missed Visits: 0    Reporting Period : 24 to 24    Patient  verified yes     Visit #   Current / Total 16 24   Time   In / Out 946 1027   Pain   In / Out 0 0   Subjective Functional Status/Changes: My back feels good, but my left knee has been hurting.      TREATMENT AREA =  Other low back pain [M54.59]    OBJECTIVE  Therapeutic Procedures:    Tx Min Billable or 1:1 Min (if diff from Tx Min) Procedure, Rationale, Specifics   23  76855 Therapeutic Exercise (timed):  increase ROM, strength, coordination, balance, and proprioception to improve patient's ability to progress to PLOF and address remaining functional goals. (see flow sheet as applicable)     Details if applicable:       10  04869 Neuromuscular Re-Education (timed):  improve balance, coordination, kinesthetic sense, posture, core stability and proprioception to improve patient's ability to develop conscious control of individual muscles and awareness of position of extremities in order to progress to PLOF and address remaining functional goals. (see flow sheet as applicable)     Details if applicable:     8  49310 Self Care/Home Management (timed):  improve patient knowledge and understanding of updated HEP for maintenance  to improve patient's ability to progress to PLOF and address remaining functional goals.  (see flow sheet as

## 2024-03-18 ENCOUNTER — HOSPITAL ENCOUNTER (OUTPATIENT)
Facility: HOSPITAL | Age: 74
Discharge: HOME | End: 2024-03-21
Payer: MEDICARE

## 2024-03-18 DIAGNOSIS — M54.50 LOW BACK PAIN, UNSPECIFIED BACK PAIN LATERALITY, UNSPECIFIED CHRONICITY, UNSPECIFIED WHETHER SCIATICA PRESENT: ICD-10-CM

## 2024-03-18 PROCEDURE — 72100 X-RAY EXAM L-S SPINE 2/3 VWS: CPT

## 2024-03-27 ENCOUNTER — HOSPITAL ENCOUNTER (OUTPATIENT)
Facility: HOSPITAL | Age: 74
Setting detail: RECURRING SERIES
Discharge: HOME | End: 2024-03-30
Payer: MEDICARE

## 2024-03-27 PROCEDURE — 97161 PT EVAL LOW COMPLEX 20 MIN: CPT

## 2024-03-27 PROCEDURE — 97110 THERAPEUTIC EXERCISES: CPT

## 2024-03-27 PROCEDURE — 97140 MANUAL THERAPY 1/> REGIONS: CPT

## 2024-03-27 NOTE — PROGRESS NOTES
[facility]  [address] [facility] [address] [place] [address] Ph:[phone] Fx: [phone]    PLAN OF CARE/ Statement of Necessity for Physical Therapy Services           Patient name: Keiko Zheng Start of Care: 3/27/2024   Referral source: Dillon Scott, * : 1950    Medical Diagnosis: Other low back pain [M54.59]       Onset Date: 3/18/24   Treatment Diagnosis: M54.59  OTHER LOWER BACK PAIN                                     Prior Hospitalization: see medical history Provider#: [id number]   Medications: Verified on Patient Summary List     Comorbidities: L5-S1 decompression 10/2023; OA; HTN; BMI > 30  Prior Level of Function: (I) with all ADLs and daily activities;  no pain with sit<>stand    The Plan of Care and following information is based on the information from the initial evaluation.    Assessment / hudson information:  74 y.o. year old female presents with CC of left sided T-L pain that began a couple weeks ago when patient attempted to lift and move a heavy fish tank. Signs and symptoms correlate to L/S strain. Impairments noted today: decreased L/S flexion; increased mm restrictions in left T-L paraspinals, QL, and glutes; decreased strength in B hip ext and abd.  Patient will benefit from physical therapy to address deficits, and ultimately to return patient to prior level of function.      Evaluation Complexity:  History:  MEDIUM  Complexity : 1-2 comorbidities / personal factors will impact the outcome/ POC ; Examination:  MEDIUM Complexity : 3 Standardized tests and measures addressin body structure, function, activity limitation and / or participation in recreation  ;Presentation:  LOW Complexity : Stable, uncomplicated  ;Clinical Decision Making:  MEDIUM Complexity : FOTO score of 26-74 FOTO score = an established functional score where 100 = no disability  Overall Complexity Rating: LOW   Problem List: pain affecting function,

## 2024-03-27 NOTE — PROGRESS NOTES
PHYSICAL / OCCUPATIONAL THERAPY - DAILY TREATMENT NOTE (updated )    Patient Name: Keiko Zheng    Date: 3/27/2024    : 1950  Insurance: Payor: MEDICARE / Plan: MEDICARE PART A AND B / Product Type: *No Product type* /      Patient  verified Yes     Visit #   Current / Total 1 24   Time   In / Out 910 947   Pain   In / Out 6 3   Subjective Functional Status/Changes: See eval   Changes to:  Meds, Allergies, Med Hx, Sx Hx?  If yes, update Summary List yes  Gabapentin     TREATMENT AREA =  Other low back pain [M54.59]    OBJECTIVE    Modalities Rationale:     decrease pain to improve patient's ability to progress to PLOF and address remaining functional goals.     min [] Estim Unattended, type/location:                                      []  w/ice    []  w/heat    min [] Estim Attended, type/location:                                     []  w/US     []  w/ice    []  w/heat    []  TENS insruct      min []  Mechanical Traction: type/lbs                   []  pro   []  sup   []  int   []  cont    []  before manual    []  after manual    min []  Ultrasound, settings/location:      min []  Iontophoresis w/ dexamethasone, location:                                               []  take home patch       []  in clinic     5   min  unbilled [x]  Ice     []  Heat    location/position: Prone; L/S    min []  Paraffin,  details:     min []  Vasopneumatic Device, press/temp:     min []  Whirlpool / Fluido:    If using vaso (only need to measure limb vaso being performed on)      pre-treatment girth :       post-treatment girth :       measured at (landmark location) :      min []  Other:    Skin assessment post-treatment (if applicable):    []  intact    []  redness- no adverse reaction                 []redness - adverse reaction:         Therapeutic Procedures:  Tx Min Billable or 1:1 Min (if diff from Tx Min) Procedure, Rationale, Specifics   8  49665 Therapeutic Exercise (timed):  increase ROM, strength,

## 2024-04-04 ENCOUNTER — TELEPHONE (OUTPATIENT)
Facility: HOSPITAL | Age: 74
End: 2024-04-04

## 2024-04-05 ENCOUNTER — HOSPITAL ENCOUNTER (OUTPATIENT)
Facility: HOSPITAL | Age: 74
Setting detail: RECURRING SERIES
Discharge: HOME | End: 2024-04-08
Payer: MEDICARE

## 2024-04-05 PROCEDURE — 97110 THERAPEUTIC EXERCISES: CPT

## 2024-04-05 PROCEDURE — 97112 NEUROMUSCULAR REEDUCATION: CPT

## 2024-04-05 PROCEDURE — 97140 MANUAL THERAPY 1/> REGIONS: CPT

## 2024-04-05 NOTE — PROGRESS NOTES
PT / OT services to modify and progress therapeutic interventions, analyze and address functional mobility deficits, analyze and address ROM deficits, analyze and address strength deficits, analyze and address soft tissue restrictions, and analyze and cue for proper movement patterns to address functional deficits and attain remaining goals.    Progress toward goals / Updated goals:  []  See Progress Note/Recertification    Short Term Goals: To be accomplished in 2 weeks  1. I and compliant with HEP for self management of symptoms.   IE: issued HEP  Current: Met, pt reports compliance. 4/5/2024  2. Increase L/S flexion to finger tips to toes to increase ease with ADLs.  IE:8\" from toes  Long Term Goals: To be accomplished in 12 weeks  1. Improve FOTO to 63 to indicate improved function with daily activities.   IE:47  2. Increase B hip abd to 5/5 to improve stability to enable patient to perform house work without difficulty.  IE: B 4+/5  3. Increase B hip ext strength to 4/5 to increase ease with sit<>stand transfers.  IE: B 3+/5  4. Patient will perform box lifts with good body mechanics to decreased risk for future injury with lifting.   IE: poor body mechanics    PLAN  Yes  Continue plan of care  []  Upgrade activities as tolerated  []  Discharge due to :  []  Other:    Talon Roberts PTA    4/5/2024    10:34 AM    Future Appointments   Date Time Provider Department Center   4/8/2024  1:50 PM Talon Roberts PTA [facility]   4/10/2024  9:50 AM Erum Velasco, PT [facility]   4/15/2024 12:30 PM Talon Roberts PTA [facility]   4/19/2024  9:10 AM Talon Roberts PTA [facility]   4/22/2024 10:30 AM Talon Roberts PTA [facility]   4/24/2024 10:30 AM Erum Velasco, PT [facility]   4/29/2024 10:30 AM Erum Velasco, PT [facility]   5/1/2024 10:30 AM Erum Velasco, PT [facility]   5/6/2024 10:30 AM Erum Velasco,

## 2024-04-08 ENCOUNTER — HOSPITAL ENCOUNTER (OUTPATIENT)
Facility: HOSPITAL | Age: 74
Setting detail: RECURRING SERIES
Discharge: HOME | End: 2024-04-11
Payer: MEDICARE

## 2024-04-08 PROCEDURE — 97112 NEUROMUSCULAR REEDUCATION: CPT

## 2024-04-08 PROCEDURE — 97110 THERAPEUTIC EXERCISES: CPT

## 2024-04-08 PROCEDURE — 97140 MANUAL THERAPY 1/> REGIONS: CPT

## 2024-04-08 NOTE — PROGRESS NOTES
PHYSICAL / OCCUPATIONAL THERAPY - DAILY TREATMENT NOTE     Patient Name: Keiko Zheng    Date: 2024    : 1950  Insurance: Payor: MEDICARE / Plan: MEDICARE PART A AND B / Product Type: *No Product type* /      Patient  verified Yes     Visit #   Current / Total 3 24   Time   In / Out 1:50 2:40   Pain   In / Out 4/10 3/10   Subjective Functional Status/Changes: \"I felt find leaving last time but I was sore that night. I think we did too much.\"   Changes to:  Allergies, Med Hx, Sx Hx?   no       TREATMENT AREA =  Other low back pain [M54.59]    OBJECTIVE    Modalities Rationale:     decrease pain to improve patient's ability to progress to PLOF and address remaining functional goals.     min [] Estim Unattended, type/location:                                      []  w/ice    []  w/heat    min [] Estim Attended, type/location:                                     []  w/US     []  w/ice    []  w/heat    []  TENS insruct      min []  Mechanical Traction: type/lbs                   []  pro   []  sup   []  int   []  cont    []  before manual    []  after manual    min []  Ultrasound, settings/location:      min []  Iontophoresis w/ dexamethasone, location:                                               []  take home patch       []  in clinic     10   min  unbilled [x]  Ice     []  Heat    location/position:  Low back - Pt prone    min []  Paraffin,  details:     min []  Vasopneumatic Device, press/temp:     min []  Whirlpool / Fluido:    If using vaso (only need to measure limb vaso being performed on)      pre-treatment girth :       post-treatment girth :       measured at (landmark location) :      min []  Other:    Skin assessment post-treatment (if applicable):    []  intact    []  redness- no adverse reaction                 []redness - adverse reaction:         Therapeutic Procedures:  Tx Min Billable or 1:1 Min (if diff from Tx Min) Procedure, Rationale, Specifics   17  26919 Therapeutic Exercise

## 2024-04-10 ENCOUNTER — HOSPITAL ENCOUNTER (OUTPATIENT)
Facility: HOSPITAL | Age: 74
Setting detail: RECURRING SERIES
Discharge: HOME | End: 2024-04-13
Payer: MEDICARE

## 2024-04-10 PROCEDURE — 97140 MANUAL THERAPY 1/> REGIONS: CPT

## 2024-04-10 PROCEDURE — 97110 THERAPEUTIC EXERCISES: CPT

## 2024-04-10 PROCEDURE — 97530 THERAPEUTIC ACTIVITIES: CPT

## 2024-04-10 NOTE — PROGRESS NOTES
[facility]   5/6/2024 10:30 AM Erum Velasco, PT [facility]   9/9/2024 10:00 AM Olu Franco MD [facility] BS AMB

## 2024-04-11 ENCOUNTER — HOSPITAL ENCOUNTER (OUTPATIENT)
Facility: HOSPITAL | Age: 74
Discharge: HOME | End: 2024-04-11
Payer: MEDICARE

## 2024-04-11 PROCEDURE — 86003 ALLG SPEC IGE CRUDE XTRC EA: CPT

## 2024-04-11 PROCEDURE — 36415 COLL VENOUS BLD VENIPUNCTURE: CPT

## 2024-04-12 ENCOUNTER — APPOINTMENT (OUTPATIENT)
Facility: HOSPITAL | Age: 74
End: 2024-04-12
Payer: MEDICARE

## 2024-04-15 ENCOUNTER — HOSPITAL ENCOUNTER (OUTPATIENT)
Facility: HOSPITAL | Age: 74
Setting detail: RECURRING SERIES
Discharge: HOME | End: 2024-04-18
Payer: MEDICARE

## 2024-04-15 LAB
CLAM IGE QN: <0.1 KU/L
CODFISH IGE QN: <0.1 KU/L
CORN IGE QN: <0.1 KU/L
COW MILK IGE QN: <0.1 KU/L
EGG WHITE IGE QN: <0.1 KU/L
GREEN COFFEE BEAN IGE QN: <0.1 KU/L
Lab: NORMAL
PEANUT IGE QN: <0.1 KU/L
PINEAPPLE IGE QN: <0.1 KU/L
SCALLOP IGE QN: <0.1 KU/L
SESAME SEED IGE QN: <0.1 KU/L
SHRIMP IGE QN: <0.1 KU/L
SOYBEAN IGE QN: <0.1 KU/L
STRAWBERRY IGE QN: <0.1 KU/L
WALNUT IGE QN: <0.1 KU/L
WHEAT IGE QN: <0.1 KU/L

## 2024-04-15 PROCEDURE — 97110 THERAPEUTIC EXERCISES: CPT

## 2024-04-15 PROCEDURE — 97140 MANUAL THERAPY 1/> REGIONS: CPT

## 2024-04-15 NOTE — PROGRESS NOTES
PHYSICAL / OCCUPATIONAL THERAPY - DAILY TREATMENT NOTE     Patient Name: Keiko Zheng    Date: 4/15/2024    : 1950  Insurance: Payor: MEDICARE / Plan: MEDICARE PART A AND B / Product Type: *No Product type* /      Patient  verified Yes     Visit #   Current / Total 5 24   Time   In / Out 12:30 1:08   Pain   In / Out 3/10 2/10   Subjective Functional Status/Changes: \"Erum changed up my program because I was hurting so much.\"   Changes to:  Allergies, Med Hx, Sx Hx?   no       TREATMENT AREA =  Other low back pain [M54.59]    OBJECTIVE    Therapeutic Procedures:  Tx Min Billable or 1:1 Min (if diff from Tx Min) Procedure, Rationale, Specifics   30  30072 Therapeutic Exercise (timed):  increase ROM, strength, coordination, balance, and proprioception to improve patient's ability to progress to PLOF and address remaining functional goals. (see flow sheet as applicable)    Details if applicable:       8  49056 Manual Therapy (timed):  decrease pain, increase ROM, increase tissue extensibility, and decrease trigger points to improve patient's ability to progress to PLOF and address remaining functional goals.  The manual therapy interventions were performed at a separate and distinct time from the therapeutic activities interventions . Details:      Details if applicable:  STM Left QL, L/S paraspinals, glute med and piriformis. Pt prone.   38  MC BC Totals Reminder: bill using total billable min of TIMED therapeutic procedures (example: do not include dry needle or estim unattended, both untimed codes, in totals to left)  8-22 min = 1 unit; 23-37 min = 2 units; 38-52 min = 3 units; 53-67 min = 4 units; 68-82 min = 5 units   Total Total     TOTAL TREATMENT TIME:        38     [x]  Patient Education billed concurrently with other procedures   [x] Review HEP    [] Progressed/Changed HEP, detail:    [] Other detail:       Objective Information/Functional Measures/Assessment    MMT hip abd (B) 4+/5. Pt reports

## 2024-04-18 LAB
Lab: NORMAL
REFERENCE LAB: NORMAL

## 2024-04-19 ENCOUNTER — HOSPITAL ENCOUNTER (OUTPATIENT)
Facility: HOSPITAL | Age: 74
Setting detail: RECURRING SERIES
Discharge: HOME | End: 2024-04-22
Payer: MEDICARE

## 2024-04-19 PROCEDURE — 97140 MANUAL THERAPY 1/> REGIONS: CPT

## 2024-04-19 PROCEDURE — 97112 NEUROMUSCULAR REEDUCATION: CPT

## 2024-04-19 PROCEDURE — 97110 THERAPEUTIC EXERCISES: CPT

## 2024-04-19 NOTE — PROGRESS NOTES
PHYSICAL / OCCUPATIONAL THERAPY - DAILY TREATMENT NOTE     Patient Name: Keiko Zheng    Date: 2024    : 1950  Insurance: Payor: MEDICARE / Plan: MEDICARE PART A AND B / Product Type: *No Product type* /      Patient  verified Yes     Visit #   Current / Total 6 24   Time   In / Out 9:09 9:58   Pain   In / Out 5/10 2/10   Subjective Functional Status/Changes: \"Having a lot of pain today, body hurts all over.\"   Changes to:  Allergies, Med Hx, Sx Hx?   no       TREATMENT AREA =  Other low back pain [M54.59]    OBJECTIVE    Modalities Rationale:     decrease pain to improve patient's ability to progress to PLOF and address remaining functional goals.     min [] Estim Unattended, type/location:                                      []  w/ice    []  w/heat    min [] Estim Attended, type/location:                                     []  w/US     []  w/ice    []  w/heat    []  TENS insruct      min []  Mechanical Traction: type/lbs                   []  pro   []  sup   []  int   []  cont    []  before manual    []  after manual    min []  Ultrasound, settings/location:      min []  Iontophoresis w/ dexamethasone, location:                                               []  take home patch       []  in clinic     10   min  unbilled [x]  Ice     []  Heat    location/position:  Low back - Pt prone    min []  Paraffin,  details:     min []  Vasopneumatic Device, press/temp:     min []  Whirlpool / Fluido:    If using vaso (only need to measure limb vaso being performed on)      pre-treatment girth :       post-treatment girth :       measured at (landmark location) :      min []  Other:    Skin assessment post-treatment (if applicable):    []  intact    []  redness- no adverse reaction                 []redness - adverse reaction:         Therapeutic Procedures:  Tx Min Billable or 1:1 Min (if diff from Tx Min) Procedure, Rationale, Specifics   23  86389 Therapeutic Exercise (timed):  increase ROM, strength,

## 2024-04-22 ENCOUNTER — HOSPITAL ENCOUNTER (OUTPATIENT)
Facility: HOSPITAL | Age: 74
Setting detail: RECURRING SERIES
Discharge: HOME | End: 2024-04-25
Payer: MEDICARE

## 2024-04-22 PROCEDURE — 97110 THERAPEUTIC EXERCISES: CPT

## 2024-04-22 PROCEDURE — 97112 NEUROMUSCULAR REEDUCATION: CPT

## 2024-04-22 NOTE — PROGRESS NOTES
PHYSICAL / OCCUPATIONAL THERAPY - DAILY TREATMENT NOTE     Patient Name: Keiko Zheng    Date: 2024    : 1950  Insurance: Payor: MEDICARE / Plan: MEDICARE PART A AND B / Product Type: *No Product type* /      Patient  verified Yes     Visit #   Current / Total 7 24   Time   In / Out 10:40 11:10   Pain   In / Out 5/10 0/10   Subjective Functional Status/Changes: Pt denied low back pain, reports pain is in her hips/glutes today.   Changes to:  Allergies, Med Hx, Sx Hx?   no       TREATMENT AREA =  Other low back pain [M54.59]    OBJECTIVE    Therapeutic Procedures:  Tx Min Billable or 1:1 Min (if diff from Tx Min) Procedure, Rationale, Specifics   22  93570 Therapeutic Exercise (timed):  increase ROM, strength, coordination, balance, and proprioception to improve patient's ability to progress to PLOF and address remaining functional goals. (see flow sheet as applicable)    Details if applicable:       8  26868 Neuromuscular Re-Education (timed):  improve balance, coordination, kinesthetic sense, posture, core stability and proprioception to improve patient's ability to develop conscious control of individual muscles and awareness of position of extremities in order to progress to PLOF and address remaining functional goals. (see flow sheet as applicable)    Details if applicable:  1/2 prone glute activation.    30  [facility] Totals Reminder: bill using total billable min of TIMED therapeutic procedures (example: do not include dry needle or estim unattended, both untimed codes, in totals to left)  8-22 min = 1 unit; 23-37 min = 2 units; 38-52 min = 3 units; 53-67 min = 4 units; 68-82 min = 5 units   Total Total     TOTAL TREATMENT TIME:        30     [x]  Patient Education billed concurrently with other procedures   [x] Review HEP    [] Progressed/Changed HEP, detail:    [] Other detail:       Objective Information/Functional Measures/Assessment    MMT hip ext (B) 4-/5. Pt 10' late to appointment. Pt

## 2024-04-24 ENCOUNTER — HOSPITAL ENCOUNTER (OUTPATIENT)
Facility: HOSPITAL | Age: 74
Setting detail: RECURRING SERIES
Discharge: HOME | End: 2024-04-27
Payer: MEDICARE

## 2024-04-24 PROCEDURE — 97112 NEUROMUSCULAR REEDUCATION: CPT

## 2024-04-24 PROCEDURE — 97140 MANUAL THERAPY 1/> REGIONS: CPT

## 2024-04-24 PROCEDURE — 97110 THERAPEUTIC EXERCISES: CPT

## 2024-04-24 NOTE — PROGRESS NOTES
PHYSICAL / OCCUPATIONAL THERAPY - DAILY TREATMENT NOTE (updated )    Patient Name: Keiko Zheng    Date: 2024    : 1950  Insurance: Payor: MEDICARE / Plan: MEDICARE PART A AND B / Product Type: *No Product type* /      Patient  verified Yes     Visit #   Current / Total 8 24   Time   In / Out 1030 1109   Pain   In / Out 3-4 2   Subjective Functional Status/Changes: Not as bad today.   Changes to:  Meds, Allergies, Med Hx, Sx Hx?  If yes, update Summary List no       TREATMENT AREA =  Other low back pain [M54.59]    OBJECTIVE     Therapeutic Procedures:  Tx Min Billable or 1:1 Min (if diff from Tx Min) Procedure, Rationale, Specifics     03636 Therapeutic Exercise (timed):  increase ROM, strength, coordination, balance, and proprioception to improve patient's ability to progress to PLOF and address remaining functional goals. (see flow sheet as applicable)     Details if applicable:       8  93698 Neuromuscular Re-Education (timed):  improve balance, coordination, kinesthetic sense, posture, core stability and proprioception to improve patient's ability to develop conscious control of individual muscles and awareness of position of extremities in order to progress to PLOF and address remaining functional goals. (see flow sheet as applicable)     Details if applicable:     8  08687 Manual Therapy (timed):  decrease pain, increase ROM, increase tissue extensibility, and decrease trigger points to improve patient's ability to progress to PLOF and address remaining functional goals.  The manual therapy interventions were performed at a separate and distinct time from the therapeutic activities interventions . (see flow sheet as applicable)     Details if applicable:   STM  B QL, L/S paraspinals,  B glute med and piriformis. Pt prone.           Details if applicable:            Details if applicable:     39  [facility] Totals Reminder: bill using total billable min of TIMED therapeutic procedures 
mobility. Patient continues to lack B hip strength and L/S flexion. Patient will benefit from continuing with therapy to further progress towards goals.          ASSESSMENT/RECOMMENDATIONS:    Continue per plan of care.     Thank you for this referral.   Erum Velasco, PT 4/24/2024 10:23 AM

## 2024-04-29 ENCOUNTER — HOSPITAL ENCOUNTER (OUTPATIENT)
Facility: HOSPITAL | Age: 74
Setting detail: RECURRING SERIES
Discharge: HOME | End: 2024-05-02
Payer: MEDICARE

## 2024-04-29 PROCEDURE — 97112 NEUROMUSCULAR REEDUCATION: CPT

## 2024-04-29 PROCEDURE — 97140 MANUAL THERAPY 1/> REGIONS: CPT

## 2024-04-29 PROCEDURE — 97110 THERAPEUTIC EXERCISES: CPT

## 2024-04-29 NOTE — PROGRESS NOTES
PHYSICAL / OCCUPATIONAL THERAPY - DAILY TREATMENT NOTE (updated )    Patient Name: Keiko Zheng    Date: 2024    : 1950  Insurance: Payor: MEDICARE / Plan: MEDICARE PART A AND B / Product Type: *No Product type* /      Patient  verified Yes     Visit #   Current / Total 9 24   Time   In / Out 1030 1109   Pain   In / Out 2 0   Subjective Functional Status/Changes: I did some gardening over the weekend.   Changes to:  Meds, Allergies, Med Hx, Sx Hx?  If yes, update Summary List no       TREATMENT AREA =  Other low back pain [M54.59]    OBJECTIVE    Therapeutic Procedures:  Tx Min Billable or 1:1 Min (if diff from Tx Min) Procedure, Rationale, Specifics   23  96812 Therapeutic Exercise (timed):  increase ROM, strength, coordination, balance, and proprioception to improve patient's ability to progress to PLOF and address remaining functional goals. (see flow sheet as applicable)     Details if applicable:       8  60664 Neuromuscular Re-Education (timed):  improve balance, coordination, kinesthetic sense, posture, core stability and proprioception to improve patient's ability to develop conscious control of individual muscles and awareness of position of extremities in order to progress to PLOF and address remaining functional goals. (see flow sheet as applicable)     Details if applicable:     8  22983 Manual Therapy (timed):  decrease pain and increase tissue extensibility to improve patient's ability to progress to PLOF and address remaining functional goals.  The manual therapy interventions were performed at a separate and distinct time from the therapeutic activities interventions . (see flow sheet as applicable)     Details if applicable:  STM  B QL, L/S paraspinals,  B glute med and piriformis. Pt prone.           Details if applicable:            Details if applicable:     39  [facility] Totals Reminder: bill using total billable min of TIMED therapeutic procedures (example: do not include dry

## 2024-05-01 ENCOUNTER — HOSPITAL ENCOUNTER (OUTPATIENT)
Facility: HOSPITAL | Age: 74
Setting detail: RECURRING SERIES
Discharge: HOME | End: 2024-05-04
Payer: MEDICARE

## 2024-05-01 PROCEDURE — 97140 MANUAL THERAPY 1/> REGIONS: CPT

## 2024-05-01 PROCEDURE — 97110 THERAPEUTIC EXERCISES: CPT

## 2024-05-01 PROCEDURE — 97530 THERAPEUTIC ACTIVITIES: CPT

## 2024-05-01 NOTE — PROGRESS NOTES
PHYSICAL / OCCUPATIONAL THERAPY - DAILY TREATMENT NOTE (updated )    Patient Name: Keiko Zheng    Date: 2024    : 1950  Insurance: Payor: MEDICARE / Plan: MEDICARE PART A AND B / Product Type: *No Product type* /      Patient  verified Yes     Visit #   Current / Total 10 24   Time   In / Out 1020 1103   Pain   In / Out 2-3 2   Subjective Functional Status/Changes: It's ok today.   Changes to:  Meds, Allergies, Med Hx, Sx Hx?  If yes, update Summary List no       TREATMENT AREA =  Other low back pain [M54.59]    OBJECTIVE      Therapeutic Procedures:  Tx Min Billable or 1:1 Min (if diff from Tx Min) Procedure, Rationale, Specifics   27  12210 Therapeutic Exercise (timed):  increase ROM, strength, coordination, balance, and proprioception to improve patient's ability to progress to PLOF and address remaining functional goals. (see flow sheet as applicable)     Details if applicable:       8  25709 Therapeutic Activity (timed):  use of dynamic activities replicating functional movements to increase ROM, strength, coordination, balance, and proprioception in order to improve patient's ability to progress to PLOF and address remaining functional goals.  (see flow sheet as applicable)     Details if applicable:  box lifts/body mechanics   8  59045 Manual Therapy (timed):  decrease pain, increase ROM, increase tissue extensibility, and decrease trigger points to improve patient's ability to progress to PLOF and address remaining functional goals.  The manual therapy interventions were performed at a separate and distinct time from the therapeutic activities interventions . (see flow sheet as applicable)     Details if applicable:  STM B QL, L/S paraspinals, B glute med and piriformis. Pt prone.           Details if applicable:            Details if applicable:     43  [facility] Totals Reminder: bill using total billable min of TIMED therapeutic procedures (example: do not include dry needle or estim

## 2024-05-06 ENCOUNTER — APPOINTMENT (OUTPATIENT)
Facility: HOSPITAL | Age: 74
End: 2024-05-06
Payer: MEDICARE

## 2024-05-08 ENCOUNTER — HOSPITAL ENCOUNTER (OUTPATIENT)
Facility: HOSPITAL | Age: 74
Setting detail: RECURRING SERIES
Discharge: HOME | End: 2024-05-11
Payer: MEDICARE

## 2024-05-08 PROCEDURE — 97110 THERAPEUTIC EXERCISES: CPT

## 2024-05-08 PROCEDURE — 97140 MANUAL THERAPY 1/> REGIONS: CPT

## 2024-05-08 PROCEDURE — 97112 NEUROMUSCULAR REEDUCATION: CPT

## 2024-05-08 NOTE — PROGRESS NOTES
were performed at a separate and distinct time from the therapeutic activities interventions . (see flow sheet as applicable)     Details if applicable:   STM B QL, L/S paraspinals, B glute med and piriformis. Pt prone.            Details if applicable:            Details if applicable:     48  [facility] Totals Reminder: bill using total billable min of TIMED therapeutic procedures (example: do not include dry needle or estim unattended, both untimed codes, in totals to left)  8-22 min = 1 unit; 23-37 min = 2 units; 38-52 min = 3 units; 53-67 min = 4 units; 68-82 min = 5 units   Total Total       [x]  Patient Education billed concurrently with other procedures   [x] Review HEP    [] Progressed/Changed HEP, detail:    [] Other detail:       Objective Information/Functional Measures/Assessment    Patient will continue with HEP for maintenance.     GOALS  Short Term Goals: To be accomplished in 2 weeks  1. I and compliant with HEP for self management of symptoms.   IE: issued HEP  PN: Met, pt reports compliance.   2. Increase L/S flexion to finger tips to toes to increase ease with ADLs.  IE:8\" from toes  PN: Progressing, 5\" from toes.  Current:1\" from toes progressing  Long Term Goals: To be accomplished in 12 weeks  1. Improve FOTO to 63 to indicate improved function with daily activities.   IE:47  PN:56 progressing   Current:60 progressing  2. Increase B hip abd to 5/5 to improve stability to enable patient to perform house work without difficulty.  IE: B 4+/5  PN: Remains 4+/5 (B).   Current:5/5 B goal met   3. Increase B hip ext strength to 4/5 to increase ease with sit<>stand transfers.  IE: B 3+/5  PN: Progressing, (B) 4-/5.   Current:4/5 B goal met  4. Patient will perform box lifts with good body mechanics to decreased risk for future injury with lifting.   IE: poor body mechanics  PN: Fair body mechanics  Current: poor mechanics- requires cueing 5/1/24  RECOMMENDATIONS  Discontinue therapy. Progressing towards or

## 2024-05-09 LAB
Lab: NORMAL
Lab: NORMAL
REFERENCE LAB: NORMAL

## 2024-05-30 ENCOUNTER — HOSPITAL ENCOUNTER (OUTPATIENT)
Facility: HOSPITAL | Age: 74
Discharge: HOME | End: 2024-05-30
Payer: MEDICARE

## 2024-05-30 DIAGNOSIS — M54.2 NECK PAIN: ICD-10-CM

## 2024-05-30 DIAGNOSIS — M54.50 LOW BACK PAIN, UNSPECIFIED BACK PAIN LATERALITY, UNSPECIFIED CHRONICITY, UNSPECIFIED WHETHER SCIATICA PRESENT: ICD-10-CM

## 2024-05-30 DIAGNOSIS — M54.6 THORACIC BACK PAIN, UNSPECIFIED BACK PAIN LATERALITY, UNSPECIFIED CHRONICITY: ICD-10-CM

## 2024-05-30 PROCEDURE — 72070 X-RAY EXAM THORAC SPINE 2VWS: CPT

## 2024-05-30 PROCEDURE — 72050 X-RAY EXAM NECK SPINE 4/5VWS: CPT

## 2024-05-30 PROCEDURE — 72110 X-RAY EXAM L-2 SPINE 4/>VWS: CPT

## 2024-07-01 ENCOUNTER — HOSPITAL ENCOUNTER (OUTPATIENT)
Facility: HOSPITAL | Age: 74
Discharge: HOME | End: 2024-07-04
Attending: FAMILY MEDICINE
Payer: MEDICARE

## 2024-07-01 DIAGNOSIS — Z87.891 PERSONAL HISTORY OF TOBACCO USE, PRESENTING HAZARDS TO HEALTH: ICD-10-CM

## 2024-07-01 PROCEDURE — 71271 CT THORAX LUNG CANCER SCR C-: CPT

## 2024-08-05 ENCOUNTER — HOSPITAL ENCOUNTER (OUTPATIENT)
Facility: HOSPITAL | Age: 74
Discharge: HOME | End: 2024-08-08
Attending: FAMILY MEDICINE
Payer: MEDICARE

## 2024-08-05 DIAGNOSIS — Z12.31 VISIT FOR SCREENING MAMMOGRAM: ICD-10-CM

## 2024-08-05 PROCEDURE — 77063 BREAST TOMOSYNTHESIS BI: CPT

## 2024-09-08 ENCOUNTER — HOSPITAL ENCOUNTER (OUTPATIENT)
Facility: HOSPITAL | Age: 74
Discharge: HOME | End: 2024-09-11
Attending: FAMILY MEDICINE
Payer: MEDICARE

## 2024-09-08 DIAGNOSIS — M25.512 BILATERAL SHOULDER PAIN, UNSPECIFIED CHRONICITY: ICD-10-CM

## 2024-09-08 DIAGNOSIS — S46.009A ROTATOR CUFF INJURY, INITIAL ENCOUNTER: ICD-10-CM

## 2024-09-08 DIAGNOSIS — M25.511 BILATERAL SHOULDER PAIN, UNSPECIFIED CHRONICITY: ICD-10-CM

## 2024-09-08 PROCEDURE — 73221 MRI JOINT UPR EXTREM W/O DYE: CPT

## 2024-09-09 ENCOUNTER — OFFICE VISIT (OUTPATIENT)
Age: 74
End: 2024-09-09
Payer: MEDICARE

## 2024-09-09 VITALS
BODY MASS INDEX: 29.63 KG/M2 | OXYGEN SATURATION: 98 % | HEART RATE: 78 BPM | SYSTOLIC BLOOD PRESSURE: 134 MMHG | WEIGHT: 161 LBS | DIASTOLIC BLOOD PRESSURE: 82 MMHG | HEIGHT: 62 IN

## 2024-09-09 DIAGNOSIS — I10 ESSENTIAL (PRIMARY) HYPERTENSION: Primary | ICD-10-CM

## 2024-09-09 DIAGNOSIS — E78.5 DYSLIPIDEMIA: ICD-10-CM

## 2024-09-09 DIAGNOSIS — E03.9 HYPOTHYROIDISM, UNSPECIFIED TYPE: ICD-10-CM

## 2024-09-09 PROCEDURE — 93000 ELECTROCARDIOGRAM COMPLETE: CPT | Performed by: INTERNAL MEDICINE

## 2024-09-09 PROCEDURE — 3079F DIAST BP 80-89 MM HG: CPT | Performed by: INTERNAL MEDICINE

## 2024-09-09 PROCEDURE — 1036F TOBACCO NON-USER: CPT | Performed by: INTERNAL MEDICINE

## 2024-09-09 PROCEDURE — 3075F SYST BP GE 130 - 139MM HG: CPT | Performed by: INTERNAL MEDICINE

## 2024-09-09 PROCEDURE — 1123F ACP DISCUSS/DSCN MKR DOCD: CPT | Performed by: INTERNAL MEDICINE

## 2024-09-09 PROCEDURE — 3017F COLORECTAL CA SCREEN DOC REV: CPT | Performed by: INTERNAL MEDICINE

## 2024-09-09 PROCEDURE — 99214 OFFICE O/P EST MOD 30 MIN: CPT | Performed by: INTERNAL MEDICINE

## 2024-09-09 PROCEDURE — G8419 CALC BMI OUT NRM PARAM NOF/U: HCPCS | Performed by: INTERNAL MEDICINE

## 2024-09-09 PROCEDURE — 1090F PRES/ABSN URINE INCON ASSESS: CPT | Performed by: INTERNAL MEDICINE

## 2024-09-09 PROCEDURE — G8427 DOCREV CUR MEDS BY ELIG CLIN: HCPCS | Performed by: INTERNAL MEDICINE

## 2024-09-09 PROCEDURE — G8399 PT W/DXA RESULTS DOCUMENT: HCPCS | Performed by: INTERNAL MEDICINE

## 2024-09-09 ASSESSMENT — ENCOUNTER SYMPTOMS
SHORTNESS OF BREATH: 0
COUGH: 0
VOMITING: 0
SORE THROAT: 0
ABDOMINAL PAIN: 0
ABDOMINAL DISTENTION: 0
NAUSEA: 0
BACK PAIN: 0

## 2024-09-09 ASSESSMENT — PATIENT HEALTH QUESTIONNAIRE - PHQ9
SUM OF ALL RESPONSES TO PHQ QUESTIONS 1-9: 0
1. LITTLE INTEREST OR PLEASURE IN DOING THINGS: NOT AT ALL
SUM OF ALL RESPONSES TO PHQ QUESTIONS 1-9: 0
2. FEELING DOWN, DEPRESSED OR HOPELESS: NOT AT ALL
SUM OF ALL RESPONSES TO PHQ9 QUESTIONS 1 & 2: 0
SUM OF ALL RESPONSES TO PHQ QUESTIONS 1-9: 0
SUM OF ALL RESPONSES TO PHQ QUESTIONS 1-9: 0

## 2024-09-19 ENCOUNTER — OFFICE VISIT (OUTPATIENT)
Age: 74
End: 2024-09-19
Payer: MEDICARE

## 2024-09-19 VITALS — HEIGHT: 62 IN | BODY MASS INDEX: 29.96 KG/M2 | WEIGHT: 162.8 LBS | TEMPERATURE: 97.9 F

## 2024-09-19 DIAGNOSIS — M54.50 LUMBAR PAIN: ICD-10-CM

## 2024-09-19 DIAGNOSIS — M79.18 MYOFASCIAL PAIN: Primary | ICD-10-CM

## 2024-09-19 DIAGNOSIS — Z98.890 STATUS POST LUMBAR SPINE SURGERY FOR DECOMPRESSION OF SPINAL CORD: ICD-10-CM

## 2024-09-19 DIAGNOSIS — M54.16 LUMBAR RADICULOPATHY: ICD-10-CM

## 2024-09-19 DIAGNOSIS — M46.1 SACROILIITIS (HCC): ICD-10-CM

## 2024-09-19 PROCEDURE — G8427 DOCREV CUR MEDS BY ELIG CLIN: HCPCS | Performed by: PHYSICAL MEDICINE & REHABILITATION

## 2024-09-19 PROCEDURE — 1036F TOBACCO NON-USER: CPT | Performed by: PHYSICAL MEDICINE & REHABILITATION

## 2024-09-19 PROCEDURE — 99214 OFFICE O/P EST MOD 30 MIN: CPT | Performed by: PHYSICAL MEDICINE & REHABILITATION

## 2024-09-19 PROCEDURE — 1090F PRES/ABSN URINE INCON ASSESS: CPT | Performed by: PHYSICAL MEDICINE & REHABILITATION

## 2024-09-19 PROCEDURE — G8399 PT W/DXA RESULTS DOCUMENT: HCPCS | Performed by: PHYSICAL MEDICINE & REHABILITATION

## 2024-09-19 PROCEDURE — G8419 CALC BMI OUT NRM PARAM NOF/U: HCPCS | Performed by: PHYSICAL MEDICINE & REHABILITATION

## 2024-09-19 PROCEDURE — 3017F COLORECTAL CA SCREEN DOC REV: CPT | Performed by: PHYSICAL MEDICINE & REHABILITATION

## 2024-09-19 PROCEDURE — 1123F ACP DISCUSS/DSCN MKR DOCD: CPT | Performed by: PHYSICAL MEDICINE & REHABILITATION

## 2024-09-19 ASSESSMENT — ENCOUNTER SYMPTOMS
TROUBLE SWALLOWING: 0
WHEEZING: 0
NAUSEA: 0
BACK PAIN: 1
SHORTNESS OF BREATH: 0
VOMITING: 0

## 2024-09-30 ENCOUNTER — OFFICE VISIT (OUTPATIENT)
Age: 74
End: 2024-09-30
Payer: MEDICARE

## 2024-09-30 VITALS — BODY MASS INDEX: 29.45 KG/M2 | WEIGHT: 161 LBS

## 2024-09-30 DIAGNOSIS — M75.42 SHOULDER IMPINGEMENT SYNDROME, LEFT: ICD-10-CM

## 2024-09-30 DIAGNOSIS — S46.011A TRAUMATIC COMPLETE TEAR OF RIGHT ROTATOR CUFF, INITIAL ENCOUNTER: ICD-10-CM

## 2024-09-30 DIAGNOSIS — M75.112 NONTRAUMATIC INCOMPLETE TEAR OF LEFT ROTATOR CUFF: Primary | ICD-10-CM

## 2024-09-30 DIAGNOSIS — M19.012 OSTEOARTHRITIS OF LEFT AC (ACROMIOCLAVICULAR) JOINT: ICD-10-CM

## 2024-09-30 PROCEDURE — 1090F PRES/ABSN URINE INCON ASSESS: CPT | Performed by: FAMILY MEDICINE

## 2024-09-30 PROCEDURE — G8399 PT W/DXA RESULTS DOCUMENT: HCPCS | Performed by: FAMILY MEDICINE

## 2024-09-30 PROCEDURE — 99204 OFFICE O/P NEW MOD 45 MIN: CPT | Performed by: FAMILY MEDICINE

## 2024-09-30 PROCEDURE — G8427 DOCREV CUR MEDS BY ELIG CLIN: HCPCS | Performed by: FAMILY MEDICINE

## 2024-09-30 PROCEDURE — 3017F COLORECTAL CA SCREEN DOC REV: CPT | Performed by: FAMILY MEDICINE

## 2024-09-30 PROCEDURE — 20610 DRAIN/INJ JOINT/BURSA W/O US: CPT | Performed by: FAMILY MEDICINE

## 2024-09-30 PROCEDURE — 1123F ACP DISCUSS/DSCN MKR DOCD: CPT | Performed by: FAMILY MEDICINE

## 2024-09-30 PROCEDURE — 1036F TOBACCO NON-USER: CPT | Performed by: FAMILY MEDICINE

## 2024-09-30 PROCEDURE — G8419 CALC BMI OUT NRM PARAM NOF/U: HCPCS | Performed by: FAMILY MEDICINE

## 2024-09-30 RX ADMIN — LIDOCAINE HYDROCHLORIDE 2 ML: 10 INJECTION, SOLUTION INFILTRATION; PERINEURAL at 09:50

## 2024-09-30 RX ADMIN — METHYLPREDNISOLONE ACETATE 40 MG: 40 INJECTION, SUSPENSION INTRA-ARTICULAR; INTRALESIONAL; INTRAMUSCULAR; SOFT TISSUE at 09:50

## 2024-09-30 NOTE — PATIENT INSTRUCTIONS
Either scan this QR code on your smartphone:        Or search YouTube for my channel:    Dr. LALA Brito    Rotator cuff

## 2024-09-30 NOTE — PROGRESS NOTES
AVS reviewed: yes,   HEP: AT reviewed  Resources Provided: yes,  YouTube Videos  Patient questions/concerns answered: yes,   Patient verbalized understanding of treatment plan: yes,     
limited to bleeding, infection, and scarring discussed and Pt wishes to proceed with procedure.    The area was prepped with betadine.  Ethyl chloride spray was used, under sterile technique and without ultrasound guidance  1cc of 40mg/cc methylprednisolone acetate and 2cc lidocaine were injected into left shoulder subacromial bursa using lateral approach.  Sterile gauze used to clean the area.  Blood loss minimal.  Noticed improvement in pain Sx within 5 minutes (now rated 3/10).    Tolerated procedure well.  Discussed possible signs/Sx of infxn, and advised to seek care if concerned.    Ultrasound images (if applicable) digitally attached to CPT order in patients chart.

## 2024-10-01 RX ORDER — LIDOCAINE HYDROCHLORIDE 10 MG/ML
2 INJECTION, SOLUTION INFILTRATION; PERINEURAL ONCE
Status: COMPLETED | OUTPATIENT
Start: 2024-10-01 | End: 2024-09-30

## 2024-10-01 RX ORDER — METHYLPREDNISOLONE ACETATE 40 MG/ML
40 INJECTION, SUSPENSION INTRA-ARTICULAR; INTRALESIONAL; INTRAMUSCULAR; SOFT TISSUE ONCE
Status: COMPLETED | OUTPATIENT
Start: 2024-10-01 | End: 2024-09-30

## 2024-11-08 ENCOUNTER — OFFICE VISIT (OUTPATIENT)
Age: 74
End: 2024-11-08

## 2024-11-08 VITALS — BODY MASS INDEX: 29.45 KG/M2 | RESPIRATION RATE: 16 BRPM | HEIGHT: 62 IN

## 2024-11-08 DIAGNOSIS — M75.112 NONTRAUMATIC INCOMPLETE TEAR OF LEFT ROTATOR CUFF: ICD-10-CM

## 2024-11-08 DIAGNOSIS — M75.121 NONTRAUMATIC COMPLETE TEAR OF RIGHT ROTATOR CUFF: Primary | ICD-10-CM

## 2024-11-08 NOTE — PROGRESS NOTES
Sexual activity: Not on file   Other Topics Concern    Not on file   Social History Narrative    Not on file     Social Determinants of Health     Financial Resource Strain: Not on file   Food Insecurity: Not on file   Transportation Needs: Not on file   Physical Activity: Not on file   Stress: Not on file   Social Connections: Not on file   Intimate Partner Violence: Not on file   Housing Stability: Not on file       REVIEW OF SYSTEMS:    14 point review of systems on the intake form is negative except as noted in the HPI    PHYSICAL EXAMINATION:  Resp 16   Ht 1.575 m (5' 2\")   BMI 29.45 kg/m²   Body mass index is 29.45 kg/m².    GENERAL: Alert and oriented x3, in no acute distress, well-developed, well-nourished.  HEENT: Normocephalic, atraumatic.    Shoulder Examination     R   L  ROM   FF  Full   Full  ER  Full   Full   IR  Full   Full  Rotator Cuff Pain/Weakness   Supra  +   +   Infra  -   -   Subscap -   -  Crepitus  -   -  Effusion  -   -  Warmth  -   -   Erythema  -   -  Instability  -   -  AC Joint TTP  -   -  Clavicle   Deformity -   -   TTP  -   -  Proximal Humerus   Deformity -   -   TTP  -   -  Deltoid Strength 5   5  Biceps Strength 5   5  Biceps Deformity -   -  Biceps Groove Pain -   -  Impingement Sign -   -       IMAGING:  Imaging read by myself and interpreted as follows:  Previous x-rays of the right shoulder were reviewed in the office today.  These do not show any acute or chronic obvious bony    MRI of the right shoulder was reviewed which shows a high-grade partial-thickness, likely full-thickness component supraspinatus rotator cuff tear.  No fatty atrophy.  No retraction.    MRI of the left shoulder was reviewed which shows a partial-thickness supraspinatus rotator cuff tear.    ASSESSMENT & PLAN  Diagnosis: Bilateral rotator cuff tears    Keiko A Norm has a high-grade right partial-thickness supraspinatus rotator cuff tear and a low-grade left supraspinatus partial-thickness rotator

## 2025-02-13 ENCOUNTER — OFFICE VISIT (OUTPATIENT)
Age: 75
End: 2025-02-13

## 2025-02-13 VITALS — HEIGHT: 62 IN | BODY MASS INDEX: 29.45 KG/M2 | RESPIRATION RATE: 16 BRPM

## 2025-02-13 DIAGNOSIS — M19.011 GLENOHUMERAL ARTHRITIS, RIGHT: ICD-10-CM

## 2025-02-13 DIAGNOSIS — M75.121 NONTRAUMATIC COMPLETE TEAR OF RIGHT ROTATOR CUFF: Primary | ICD-10-CM

## 2025-02-13 RX ORDER — TRIAMCINOLONE ACETONIDE 40 MG/ML
40 INJECTION, SUSPENSION INTRA-ARTICULAR; INTRAMUSCULAR ONCE
Status: COMPLETED | OUTPATIENT
Start: 2025-02-13 | End: 2025-02-13

## 2025-02-13 RX ADMIN — TRIAMCINOLONE ACETONIDE 40 MG: 40 INJECTION, SUSPENSION INTRA-ARTICULAR; INTRAMUSCULAR at 09:42

## 2025-02-13 NOTE — PROGRESS NOTES
Keiko Zheng  1950   Chief Complaint   Patient presents with    Shoulder Pain     Chronic right shoulder pain        HISTORY OF PRESENT ILLNESS  Keiko Zheng is a 74 y.o. female who presents today for evaluation of right shoulder pain.  Pain is a 4/10. Pain has been present for awhile. She was previously seen by . she has tried injections with benefit. Still having some pain with certain movement such as reaching and overhead. She does not want to consider surgery at this time.     Has tried following treatments: Injections:Yes; Brace:No; Therapy:No; Cane/Crutch:No      Allergies   Allergen Reactions    Sulfamethoxazole-Trimethoprim Other (See Comments)     Upset stomach, makes patient real sick        Past Medical History:   Diagnosis Date    Acquired ventricular hypertrophy     Allergic rhinitis     Bladder incontinence     Cardiomyopathy (HCC)     Cardiovascular disease     Esophageal reflux     Essential hypertension     Hyperlipidemia     Numerous moles     Sleep apnea     Thyroid disease     Vitamin D deficiency       Social History       Tobacco History       Smoking Status  Never      Smokeless Tobacco Use  Never              Alcohol History       Alcohol Use Status  Yes Drinks/Week  1 Glasses of wine per week Amount  1.0 standard drink of alcohol/wk              Drug Use       Drug Use Status  Never              Sexual Activity       Sexually Active  Not Asked                   Past Surgical History:   Procedure Laterality Date    BREAST BIOPSY        Family History   Problem Relation Age of Onset    Ovarian Cancer Mother     Lung Cancer Mother     High Cholesterol Father     Osteoarthritis Father     Breast Cancer Sister 40    Heart Attack Sister     Heart Attack Brother     No Known Problems Maternal Grandmother     No Known Problems Maternal Grandfather     No Known Problems Paternal Grandmother     No Known Problems Paternal Grandfather     No Known Problems Other

## 2025-02-18 ENCOUNTER — TELEPHONE (OUTPATIENT)
Age: 75
End: 2025-02-18

## 2025-05-28 ENCOUNTER — OFFICE VISIT (OUTPATIENT)
Age: 75
End: 2025-05-28
Payer: MEDICARE

## 2025-05-28 VITALS — WEIGHT: 161 LBS | HEIGHT: 62 IN | BODY MASS INDEX: 29.63 KG/M2

## 2025-05-28 DIAGNOSIS — M19.011 GLENOHUMERAL ARTHRITIS, RIGHT: Primary | ICD-10-CM

## 2025-05-28 DIAGNOSIS — M75.112 NONTRAUMATIC INCOMPLETE TEAR OF LEFT ROTATOR CUFF: ICD-10-CM

## 2025-05-28 PROCEDURE — 99213 OFFICE O/P EST LOW 20 MIN: CPT | Performed by: ORTHOPAEDIC SURGERY

## 2025-05-28 PROCEDURE — 1160F RVW MEDS BY RX/DR IN RCRD: CPT | Performed by: ORTHOPAEDIC SURGERY

## 2025-05-28 PROCEDURE — G8427 DOCREV CUR MEDS BY ELIG CLIN: HCPCS | Performed by: ORTHOPAEDIC SURGERY

## 2025-05-28 PROCEDURE — 1123F ACP DISCUSS/DSCN MKR DOCD: CPT | Performed by: ORTHOPAEDIC SURGERY

## 2025-05-28 PROCEDURE — 20611 DRAIN/INJ JOINT/BURSA W/US: CPT | Performed by: ORTHOPAEDIC SURGERY

## 2025-05-28 PROCEDURE — 1125F AMNT PAIN NOTED PAIN PRSNT: CPT | Performed by: ORTHOPAEDIC SURGERY

## 2025-05-28 PROCEDURE — 3017F COLORECTAL CA SCREEN DOC REV: CPT | Performed by: ORTHOPAEDIC SURGERY

## 2025-05-28 PROCEDURE — 1159F MED LIST DOCD IN RCRD: CPT | Performed by: ORTHOPAEDIC SURGERY

## 2025-05-28 PROCEDURE — G8419 CALC BMI OUT NRM PARAM NOF/U: HCPCS | Performed by: ORTHOPAEDIC SURGERY

## 2025-05-28 PROCEDURE — G8399 PT W/DXA RESULTS DOCUMENT: HCPCS | Performed by: ORTHOPAEDIC SURGERY

## 2025-05-28 PROCEDURE — 1090F PRES/ABSN URINE INCON ASSESS: CPT | Performed by: ORTHOPAEDIC SURGERY

## 2025-05-28 PROCEDURE — 1036F TOBACCO NON-USER: CPT | Performed by: ORTHOPAEDIC SURGERY

## 2025-05-28 RX ORDER — TRIAMCINOLONE ACETONIDE 40 MG/ML
40 INJECTION, SUSPENSION INTRA-ARTICULAR; INTRAMUSCULAR ONCE
Status: COMPLETED | OUTPATIENT
Start: 2025-05-28 | End: 2025-05-28

## 2025-05-28 RX ADMIN — TRIAMCINOLONE ACETONIDE 40 MG: 40 INJECTION, SUSPENSION INTRA-ARTICULAR; INTRAMUSCULAR at 15:56

## 2025-05-28 NOTE — PROGRESS NOTES
the History, Physical and updated the Allergic reactions for Keiko Zheng     TIME OUT performed immediately prior to start of procedure:  I, Nash Best MD have performed the following reviews on Keiko Zheng prior to the start of the procedure:            * Patient was identified by name and date of birth   * Agreement on procedure being performed was verified  * Risks and Benefits explained to the patient  * Procedure site verified and marked as necessary  * Patient was positioned for comfort  * Consent was signed and verified     Time: 4:10 PM    Date of procedure: 5/28/2025    Procedure performed by:  Nash Best MD    Provider assisted by: (see medication administration)    How tolerated by patient: tolerated the procedure well with no complications    Comments: none     IMAGING:   XR of the right shoulder with 3 views obtained in office dated 2/13/2025 read and reviewed by myself reveal: Diffuse osteopenic changes with decreased joint space in the glenohumeral joint     MRI/[facility] right shoulder [facility] dated 9/10/2024, reviewed and read by myself reveal:     IMPRESSION:  High-grade articular surface tear throughout the entire width of the  supraspinatus tendon with extension into the anterior infraspinatus tendon.  Small full-thickness components to the supraspinatus tear. The extent of tearing is slightly progressed since 2021.     Mild to moderate cartilage disease in the humeral head. Labral degeneration.     Moderate AC joint osteoarthritis.     Subacromial/subdeltoid bursitis.    IMPRESSION:      ICD-10-CM    1. Glenohumeral arthritis, right  M19.011 AMB POC US DRAIN/INJECT LARGE JOINT/BURSA     triamcinolone acetonide (KENALOG-40) injection 40 mg      2. Nontraumatic incomplete tear of left rotator cuff  M75.112            PLAN:   1. Pt presents today with right shoulder pain secondary to glenohumeral arthritis and rotator cuff tear. Due to her increasing pain, I

## 2025-06-23 ENCOUNTER — TRANSCRIBE ORDERS (OUTPATIENT)
Facility: HOSPITAL | Age: 75
End: 2025-06-23

## 2025-06-23 DIAGNOSIS — Z12.31 ENCOUNTER FOR SCREENING MAMMOGRAM FOR MALIGNANT NEOPLASM OF BREAST: Primary | ICD-10-CM

## 2025-08-06 ENCOUNTER — HOSPITAL ENCOUNTER (OUTPATIENT)
Facility: HOSPITAL | Age: 75
Discharge: HOME | End: 2025-08-09
Attending: FAMILY MEDICINE
Payer: MEDICARE

## 2025-08-06 VITALS — BODY MASS INDEX: 29.83 KG/M2 | HEIGHT: 61 IN | WEIGHT: 158 LBS

## 2025-08-06 DIAGNOSIS — Z12.31 ENCOUNTER FOR SCREENING MAMMOGRAM FOR MALIGNANT NEOPLASM OF BREAST: ICD-10-CM

## 2025-08-06 PROCEDURE — 77063 BREAST TOMOSYNTHESIS BI: CPT

## (undated) DEVICE — SYR 10ML LUER LOK 1/5ML GRAD --

## (undated) DEVICE — TRAY MYEL SFTY +

## (undated) DEVICE — NDL SPNE MANAN 22GX6IN --

## (undated) DEVICE — BANDAGE ADH W0.75XL3IN UNIV WVN FAB NAT GEN USE STRP N ADH

## (undated) DEVICE — MEDIA CONTRAST 10ML 200MG/ML 41%